# Patient Record
Sex: MALE | Race: WHITE | NOT HISPANIC OR LATINO | Employment: OTHER | URBAN - METROPOLITAN AREA
[De-identification: names, ages, dates, MRNs, and addresses within clinical notes are randomized per-mention and may not be internally consistent; named-entity substitution may affect disease eponyms.]

---

## 2017-01-17 ENCOUNTER — COMMUNICATION - HEALTHEAST (OUTPATIENT)
Dept: INTERNAL MEDICINE | Facility: CLINIC | Age: 77
End: 2017-01-17

## 2017-01-17 DIAGNOSIS — E11.9 DIABETES (H): ICD-10-CM

## 2017-02-07 ENCOUNTER — COMMUNICATION - HEALTHEAST (OUTPATIENT)
Dept: CARDIOLOGY | Facility: CLINIC | Age: 77
End: 2017-02-07

## 2017-02-07 ENCOUNTER — AMBULATORY - HEALTHEAST (OUTPATIENT)
Dept: CARDIOLOGY | Facility: CLINIC | Age: 77
End: 2017-02-07

## 2017-02-07 DIAGNOSIS — Z95.810 ICD (IMPLANTABLE CARDIOVERTER-DEFIBRILLATOR) IN PLACE: ICD-10-CM

## 2017-02-08 ENCOUNTER — COMMUNICATION - HEALTHEAST (OUTPATIENT)
Dept: INTERNAL MEDICINE | Facility: CLINIC | Age: 77
End: 2017-02-08

## 2017-02-20 ENCOUNTER — COMMUNICATION - HEALTHEAST (OUTPATIENT)
Dept: INTERNAL MEDICINE | Facility: CLINIC | Age: 77
End: 2017-02-20

## 2017-03-23 ENCOUNTER — COMMUNICATION - HEALTHEAST (OUTPATIENT)
Dept: SCHEDULING | Facility: CLINIC | Age: 77
End: 2017-03-23

## 2017-03-23 DIAGNOSIS — E78.5 HYPERLIPEMIA: ICD-10-CM

## 2017-04-18 ENCOUNTER — COMMUNICATION - HEALTHEAST (OUTPATIENT)
Dept: INTERNAL MEDICINE | Facility: CLINIC | Age: 77
End: 2017-04-18

## 2017-04-18 DIAGNOSIS — E11.9 DIABETES (H): ICD-10-CM

## 2017-05-16 ENCOUNTER — AMBULATORY - HEALTHEAST (OUTPATIENT)
Dept: CARDIOLOGY | Facility: CLINIC | Age: 77
End: 2017-05-16

## 2017-05-16 DIAGNOSIS — Z95.810 ICD (IMPLANTABLE CARDIOVERTER-DEFIBRILLATOR), DUAL, IN SITU: ICD-10-CM

## 2017-05-17 LAB — HCC DEVICE COMMENTS: NORMAL

## 2017-08-22 ENCOUNTER — AMBULATORY - HEALTHEAST (OUTPATIENT)
Dept: CARDIOLOGY | Facility: CLINIC | Age: 77
End: 2017-08-22

## 2017-08-22 DIAGNOSIS — Z95.810 ICD (IMPLANTABLE CARDIOVERTER-DEFIBRILLATOR), DUAL, IN SITU: ICD-10-CM

## 2017-08-22 LAB — HCC DEVICE COMMENTS: NORMAL

## 2017-08-29 ENCOUNTER — AMBULATORY - HEALTHEAST (OUTPATIENT)
Dept: CARDIOLOGY | Facility: CLINIC | Age: 77
End: 2017-08-29

## 2017-08-29 DIAGNOSIS — I25.10 CAD (CORONARY ARTERY DISEASE): ICD-10-CM

## 2017-08-29 DIAGNOSIS — Z95.0 CARDIAC PACEMAKER IN SITU: ICD-10-CM

## 2017-08-29 DIAGNOSIS — I47.20 VT (VENTRICULAR TACHYCARDIA) (H): ICD-10-CM

## 2017-10-23 ENCOUNTER — COMMUNICATION - HEALTHEAST (OUTPATIENT)
Dept: INTERNAL MEDICINE | Facility: CLINIC | Age: 77
End: 2017-10-23

## 2017-10-23 DIAGNOSIS — I10 HTN (HYPERTENSION): ICD-10-CM

## 2017-11-13 ENCOUNTER — AMBULATORY - HEALTHEAST (OUTPATIENT)
Dept: CARDIOLOGY | Facility: CLINIC | Age: 77
End: 2017-11-13

## 2017-11-13 DIAGNOSIS — I47.29 PAROXYSMAL VENTRICULAR TACHYCARDIA (H): ICD-10-CM

## 2017-11-13 DIAGNOSIS — Z95.810 ICD (IMPLANTABLE CARDIOVERTER-DEFIBRILLATOR), DUAL, IN SITU: ICD-10-CM

## 2017-11-13 DIAGNOSIS — I25.10 CORONARY ARTERY DISEASE INVOLVING NATIVE HEART WITHOUT ANGINA PECTORIS, UNSPECIFIED VESSEL OR LESION TYPE: ICD-10-CM

## 2017-11-13 LAB
CHOLEST SERPL-MCNC: 122 MG/DL
FASTING STATUS PATIENT QL REPORTED: ABNORMAL
HDLC SERPL-MCNC: 38 MG/DL
LDLC SERPL CALC-MCNC: 42 MG/DL
TRIGL SERPL-MCNC: 209 MG/DL

## 2017-11-13 ASSESSMENT — MIFFLIN-ST. JEOR: SCORE: 1475.1

## 2017-11-14 LAB — HCC DEVICE COMMENTS: NORMAL

## 2017-11-22 ENCOUNTER — HOSPITAL ENCOUNTER (OUTPATIENT)
Dept: CARDIOLOGY | Facility: CLINIC | Age: 77
Discharge: HOME | End: 2017-11-22
Attending: INTERNAL MEDICINE

## 2017-11-22 ASSESSMENT — MIFFLIN-ST. JEOR: SCORE: 1475.1

## 2017-11-24 LAB
AORTIC ROOT: 3.2 CM
BSA FOR ECHO PROCEDURE: 1.95 M2
CV BLOOD PRESSURE: NORMAL MMHG
CV ECHO HEIGHT: 69 IN
CV ECHO WEIGHT: 173 LBS
DOP CALC LVOT AREA: 3.14 CM2
DOP CALC LVOT DIAMETER: 2 CM
DOP CALC LVOT PEAK VEL: 49.6 CM/S
DOP CALC LVOT STROKE VOLUME: 47.1 CM3
DOP CALCLVOT PEAK VEL VTI: 15 CM
ECHO EJECTION FRACTION ESTIMATED: 35 %
EJECTION FRACTION: 42 % (ref 55–75)
FRACTIONAL SHORTENING: 21.6 % (ref 28–44)
INTERVENTRICULAR SEPTUM IN END DIASTOLE: 1.17 CM (ref 0.6–1)
IVS/PW RATIO: 1.1
LA AREA 2: 21.2 CM2
LEFT ATRIUM LENGTH: 4.86 CM
LEFT ATRIUM SIZE: 4.2 CM
LEFT ATRIUM TO AORTIC ROOT RATIO: 1.31 NO UNITS
LEFT VENTRICLE DIASTOLIC VOLUME INDEX: 71.8 CM3/M2 (ref 34–74)
LEFT VENTRICLE DIASTOLIC VOLUME: 140 CM3 (ref 62–150)
LEFT VENTRICLE MASS INDEX: 100.3 G/M2
LEFT VENTRICLE SYSTOLIC VOLUME INDEX: 41.5 CM3/M2 (ref 11–31)
LEFT VENTRICLE SYSTOLIC VOLUME: 81 CM3 (ref 21–61)
LEFT VENTRICULAR INTERNAL DIMENSION IN DIASTOLE: 4.77 CM (ref 4.2–5.8)
LEFT VENTRICULAR INTERNAL DIMENSION IN SYSTOLE: 3.74 CM (ref 2.5–4)
LEFT VENTRICULAR MASS: 195.7 G
LEFT VENTRICULAR OUTFLOW TRACT MEAN GRADIENT: 1 MMHG
LEFT VENTRICULAR OUTFLOW TRACT MEAN VELOCITY: 38 CM/S
LEFT VENTRICULAR OUTFLOW TRACT PEAK GRADIENT: 1 MMHG
LEFT VENTRICULAR POSTERIOR WALL IN END DIASTOLE: 1.06 CM (ref 0.6–1)
LV STROKE VOLUME INDEX: 24.2 ML/M2
MITRAL VALVE E/A RATIO: 1
MV DECELERATION TIME: 239 MS
MV E'TISSUE VEL-LAT: 5.95 CM/S
MV LATERAL E/E' RATIO: 14.3
MV PEAK A VELOCITY: 89.3 CM/S
MV PEAK E VELOCITY: 84.9 CM/S
NUC REST DIASTOLIC VOLUME INDEX: 2768 LBS
NUC REST SYSTOLIC VOLUME INDEX: 69 IN
TRICUSPID REGURGITATION PEAK PRESSURE GRADIENT: 17 MMHG
TRICUSPID VALVE ANULAR PLANE SYSTOLIC EXCURSION: 1.7 CM
TRICUSPID VALVE PEAK REGURGITANT VELOCITY: 206 CM/S
TV PEAK VELOCITY: 232 CM/S
TV PEAK VELOCITY: 232 CM/S

## 2017-11-28 ENCOUNTER — COMMUNICATION - HEALTHEAST (OUTPATIENT)
Dept: CARDIOLOGY | Facility: CLINIC | Age: 77
End: 2017-11-28

## 2017-12-01 ENCOUNTER — OFFICE VISIT - HEALTHEAST (OUTPATIENT)
Dept: INTERNAL MEDICINE | Facility: CLINIC | Age: 77
End: 2017-12-01

## 2017-12-01 ENCOUNTER — COMMUNICATION - HEALTHEAST (OUTPATIENT)
Dept: INTERNAL MEDICINE | Facility: CLINIC | Age: 77
End: 2017-12-01

## 2017-12-01 DIAGNOSIS — E11.9 DM2 (DIABETES MELLITUS, TYPE 2) (H): ICD-10-CM

## 2017-12-01 DIAGNOSIS — R05.3 CHRONIC COUGH: ICD-10-CM

## 2017-12-01 LAB — HBA1C MFR BLD: 6.8 % (ref 3.5–6)

## 2017-12-01 ASSESSMENT — MIFFLIN-ST. JEOR: SCORE: 1484.17

## 2017-12-15 ENCOUNTER — COMMUNICATION - HEALTHEAST (OUTPATIENT)
Dept: CARDIOLOGY | Facility: CLINIC | Age: 77
End: 2017-12-15

## 2017-12-15 DIAGNOSIS — I48.0 PAROXYSMAL ATRIAL FIBRILLATION (H): ICD-10-CM

## 2017-12-18 ENCOUNTER — COMMUNICATION - HEALTHEAST (OUTPATIENT)
Dept: INTERNAL MEDICINE | Facility: CLINIC | Age: 77
End: 2017-12-18

## 2017-12-18 DIAGNOSIS — E11.9 DM2 (DIABETES MELLITUS, TYPE 2) (H): ICD-10-CM

## 2018-01-19 ENCOUNTER — COMMUNICATION - HEALTHEAST (OUTPATIENT)
Dept: INTERNAL MEDICINE | Facility: CLINIC | Age: 78
End: 2018-01-19

## 2018-01-19 DIAGNOSIS — I10 HTN (HYPERTENSION): ICD-10-CM

## 2018-01-23 ENCOUNTER — RECORDS - HEALTHEAST (OUTPATIENT)
Dept: ADMINISTRATIVE | Facility: OTHER | Age: 78
End: 2018-01-23

## 2018-01-29 ENCOUNTER — COMMUNICATION - HEALTHEAST (OUTPATIENT)
Dept: MEDSURG UNIT | Facility: CLINIC | Age: 78
End: 2018-01-29

## 2018-01-31 ENCOUNTER — OFFICE VISIT - HEALTHEAST (OUTPATIENT)
Dept: INTERNAL MEDICINE | Facility: CLINIC | Age: 78
End: 2018-01-31

## 2018-01-31 DIAGNOSIS — R26.9 GAIT DISTURBANCE: ICD-10-CM

## 2018-01-31 DIAGNOSIS — E11.40 TYPE 2 DIABETES MELLITUS WITH DIABETIC NEUROPATHY, WITHOUT LONG-TERM CURRENT USE OF INSULIN (H): ICD-10-CM

## 2018-01-31 ASSESSMENT — MIFFLIN-ST. JEOR: SCORE: 1486.44

## 2018-02-01 ENCOUNTER — COMMUNICATION - HEALTHEAST (OUTPATIENT)
Dept: MEDSURG UNIT | Facility: CLINIC | Age: 78
End: 2018-02-01

## 2018-02-02 ENCOUNTER — COMMUNICATION - HEALTHEAST (OUTPATIENT)
Dept: MEDSURG UNIT | Facility: CLINIC | Age: 78
End: 2018-02-02

## 2018-02-02 DIAGNOSIS — K21.9 GERD (GASTROESOPHAGEAL REFLUX DISEASE): ICD-10-CM

## 2018-02-09 ENCOUNTER — COMMUNICATION - HEALTHEAST (OUTPATIENT)
Dept: CARDIOLOGY | Facility: CLINIC | Age: 78
End: 2018-02-09

## 2018-02-09 ENCOUNTER — COMMUNICATION - HEALTHEAST (OUTPATIENT)
Dept: INTERNAL MEDICINE | Facility: CLINIC | Age: 78
End: 2018-02-09

## 2018-02-09 ENCOUNTER — COMMUNICATION - HEALTHEAST (OUTPATIENT)
Dept: SCHEDULING | Facility: CLINIC | Age: 78
End: 2018-02-09

## 2018-02-14 ENCOUNTER — HOSPITAL ENCOUNTER (OUTPATIENT)
Dept: PHYSICAL THERAPY | Facility: HOSPITAL | Age: 78
Discharge: HOME | End: 2018-02-14
Admitting: RADIOLOGY

## 2018-02-14 ENCOUNTER — HOSPITAL ENCOUNTER (OUTPATIENT)
Dept: RADIOLOGY | Facility: HOSPITAL | Age: 78
Discharge: HOME | End: 2018-02-14
Attending: PSYCHIATRY & NEUROLOGY

## 2018-02-14 DIAGNOSIS — G91.2 NPH (NORMAL PRESSURE HYDROCEPHALUS) (H): ICD-10-CM

## 2018-02-14 DIAGNOSIS — R26.9 GAIT DISTURBANCE: ICD-10-CM

## 2018-02-14 LAB
APPEARANCE CSF: CLEAR
COLOR CSF: COLORLESS
GLUCOSE CSF-MCNC: 92 MG/DL (ref 40–75)
GRAM STAIN RESULT: NORMAL
PROT CSF-MCNC: 60 MG/DL (ref 15–45)
RBC # CSF MANUAL: 138 /UL
TUBE # CSF: 4
VOLUME CSF - HISTORICAL: 5 ML
WBC # CSF MANUAL: 1 /UL

## 2018-02-15 ENCOUNTER — HOSPITAL ENCOUNTER (OUTPATIENT)
Dept: PHYSICAL THERAPY | Facility: HOSPITAL | Age: 78
Discharge: HOME | End: 2018-02-15

## 2018-02-15 ENCOUNTER — COMMUNICATION - HEALTHEAST (OUTPATIENT)
Dept: INTERNAL MEDICINE | Facility: CLINIC | Age: 78
End: 2018-02-15

## 2018-02-15 DIAGNOSIS — R26.9 GAIT DISTURBANCE: ICD-10-CM

## 2018-02-17 LAB — BACTERIA SPEC CULT: NO GROWTH

## 2018-02-19 ENCOUNTER — AMBULATORY - HEALTHEAST (OUTPATIENT)
Dept: ADMINISTRATIVE | Facility: REHABILITATION | Age: 78
End: 2018-02-19

## 2018-02-19 ENCOUNTER — RECORDS - HEALTHEAST (OUTPATIENT)
Dept: ADMINISTRATIVE | Facility: OTHER | Age: 78
End: 2018-02-19

## 2018-02-19 ENCOUNTER — RECORDS - HEALTHEAST (OUTPATIENT)
Dept: LAB | Facility: HOSPITAL | Age: 78
End: 2018-02-19

## 2018-02-19 DIAGNOSIS — R26.9 GAIT DIFFICULTY: ICD-10-CM

## 2018-02-19 LAB
C REACTIVE PROTEIN LHE: <0.1 MG/DL (ref 0–0.8)
CK SERPL-CCNC: 105 U/L (ref 30–190)
ERYTHROCYTE [SEDIMENTATION RATE] IN BLOOD BY WESTERGREN METHOD: 10 MM/HR (ref 0–15)
RHEUMATOID FACT SERPL-ACNC: <15 IU/ML (ref 0–30)

## 2018-02-20 ENCOUNTER — AMBULATORY - HEALTHEAST (OUTPATIENT)
Dept: CARDIOLOGY | Facility: CLINIC | Age: 78
End: 2018-02-20

## 2018-02-20 DIAGNOSIS — Z95.810 ICD (IMPLANTABLE CARDIOVERTER-DEFIBRILLATOR), DUAL, IN SITU: ICD-10-CM

## 2018-02-20 LAB
ANA SER QL: 0.6 U
HCC DEVICE COMMENTS: NORMAL

## 2018-02-21 ENCOUNTER — OFFICE VISIT - HEALTHEAST (OUTPATIENT)
Dept: PHYSICAL THERAPY | Facility: REHABILITATION | Age: 78
End: 2018-02-21

## 2018-02-21 ENCOUNTER — AMBULATORY - HEALTHEAST (OUTPATIENT)
Dept: INTERNAL MEDICINE | Facility: CLINIC | Age: 78
End: 2018-02-21

## 2018-02-21 ENCOUNTER — OFFICE VISIT - HEALTHEAST (OUTPATIENT)
Dept: INTERNAL MEDICINE | Facility: CLINIC | Age: 78
End: 2018-02-21

## 2018-02-21 DIAGNOSIS — M62.81 GENERALIZED MUSCLE WEAKNESS: ICD-10-CM

## 2018-02-21 DIAGNOSIS — R26.9 GAIT DISTURBANCE: ICD-10-CM

## 2018-02-21 DIAGNOSIS — I10 ESSENTIAL HYPERTENSION: ICD-10-CM

## 2018-02-21 DIAGNOSIS — S01.311A LACERATION OF RIGHT EAR, INITIAL ENCOUNTER: ICD-10-CM

## 2018-02-21 DIAGNOSIS — R26.81 UNSTEADINESS ON FEET: ICD-10-CM

## 2018-02-21 ASSESSMENT — MIFFLIN-ST. JEOR: SCORE: 1477.36

## 2018-02-26 ENCOUNTER — OFFICE VISIT - HEALTHEAST (OUTPATIENT)
Dept: PHYSICAL THERAPY | Facility: REHABILITATION | Age: 78
End: 2018-02-26

## 2018-02-26 DIAGNOSIS — R26.9 GAIT DISTURBANCE: ICD-10-CM

## 2018-02-26 DIAGNOSIS — R26.81 UNSTEADINESS ON FEET: ICD-10-CM

## 2018-02-26 DIAGNOSIS — M62.81 GENERALIZED MUSCLE WEAKNESS: ICD-10-CM

## 2018-02-28 ENCOUNTER — COMMUNICATION - HEALTHEAST (OUTPATIENT)
Dept: SCHEDULING | Facility: CLINIC | Age: 78
End: 2018-02-28

## 2018-02-28 ENCOUNTER — RECORDS - HEALTHEAST (OUTPATIENT)
Dept: ADMINISTRATIVE | Facility: OTHER | Age: 78
End: 2018-02-28

## 2018-02-28 DIAGNOSIS — E78.5 HYPERLIPEMIA: ICD-10-CM

## 2018-03-02 ENCOUNTER — OFFICE VISIT - HEALTHEAST (OUTPATIENT)
Dept: PHYSICAL THERAPY | Facility: REHABILITATION | Age: 78
End: 2018-03-02

## 2018-03-02 DIAGNOSIS — M62.81 GENERALIZED MUSCLE WEAKNESS: ICD-10-CM

## 2018-03-02 DIAGNOSIS — R26.81 UNSTEADINESS ON FEET: ICD-10-CM

## 2018-03-02 DIAGNOSIS — R26.9 GAIT DISTURBANCE: ICD-10-CM

## 2018-03-05 ENCOUNTER — OFFICE VISIT - HEALTHEAST (OUTPATIENT)
Dept: PHYSICAL THERAPY | Facility: REHABILITATION | Age: 78
End: 2018-03-05

## 2018-03-05 DIAGNOSIS — R26.9 GAIT DISTURBANCE: ICD-10-CM

## 2018-03-05 DIAGNOSIS — M62.81 GENERALIZED MUSCLE WEAKNESS: ICD-10-CM

## 2018-03-05 DIAGNOSIS — R26.81 UNSTEADINESS ON FEET: ICD-10-CM

## 2018-03-08 ENCOUNTER — OFFICE VISIT - HEALTHEAST (OUTPATIENT)
Dept: PHYSICAL THERAPY | Facility: REHABILITATION | Age: 78
End: 2018-03-08

## 2018-03-08 DIAGNOSIS — R26.81 UNSTEADINESS ON FEET: ICD-10-CM

## 2018-03-08 DIAGNOSIS — M62.81 GENERALIZED MUSCLE WEAKNESS: ICD-10-CM

## 2018-03-08 DIAGNOSIS — R26.9 GAIT DISTURBANCE: ICD-10-CM

## 2018-03-12 ENCOUNTER — OFFICE VISIT - HEALTHEAST (OUTPATIENT)
Dept: PHYSICAL THERAPY | Facility: REHABILITATION | Age: 78
End: 2018-03-12

## 2018-03-12 DIAGNOSIS — M62.81 GENERALIZED MUSCLE WEAKNESS: ICD-10-CM

## 2018-03-12 DIAGNOSIS — R26.9 GAIT DISTURBANCE: ICD-10-CM

## 2018-03-12 DIAGNOSIS — R26.81 UNSTEADINESS ON FEET: ICD-10-CM

## 2018-03-16 ENCOUNTER — COMMUNICATION - HEALTHEAST (OUTPATIENT)
Dept: INTERNAL MEDICINE | Facility: CLINIC | Age: 78
End: 2018-03-16

## 2018-03-16 ENCOUNTER — OFFICE VISIT - HEALTHEAST (OUTPATIENT)
Dept: PHYSICAL THERAPY | Facility: REHABILITATION | Age: 78
End: 2018-03-16

## 2018-03-16 DIAGNOSIS — R26.81 UNSTEADINESS ON FEET: ICD-10-CM

## 2018-03-16 DIAGNOSIS — M62.81 GENERALIZED MUSCLE WEAKNESS: ICD-10-CM

## 2018-03-16 DIAGNOSIS — E11.40 TYPE 2 DIABETES MELLITUS WITH DIABETIC NEUROPATHY (H): ICD-10-CM

## 2018-03-16 DIAGNOSIS — R26.9 GAIT DISTURBANCE: ICD-10-CM

## 2018-03-19 ENCOUNTER — OFFICE VISIT - HEALTHEAST (OUTPATIENT)
Dept: PHYSICAL THERAPY | Facility: REHABILITATION | Age: 78
End: 2018-03-19

## 2018-03-19 DIAGNOSIS — M62.81 GENERALIZED MUSCLE WEAKNESS: ICD-10-CM

## 2018-03-19 DIAGNOSIS — R26.9 GAIT DISTURBANCE: ICD-10-CM

## 2018-03-19 DIAGNOSIS — R26.81 UNSTEADINESS ON FEET: ICD-10-CM

## 2018-04-11 ENCOUNTER — COMMUNICATION - HEALTHEAST (OUTPATIENT)
Dept: INTERNAL MEDICINE | Facility: CLINIC | Age: 78
End: 2018-04-11

## 2018-04-16 ENCOUNTER — RECORDS - HEALTHEAST (OUTPATIENT)
Dept: ADMINISTRATIVE | Facility: OTHER | Age: 78
End: 2018-04-16

## 2018-04-23 ENCOUNTER — COMMUNICATION - HEALTHEAST (OUTPATIENT)
Dept: INTERNAL MEDICINE | Facility: CLINIC | Age: 78
End: 2018-04-23

## 2018-04-23 DIAGNOSIS — E11.9 DIABETES (H): ICD-10-CM

## 2018-04-27 ENCOUNTER — COMMUNICATION - HEALTHEAST (OUTPATIENT)
Dept: INTERNAL MEDICINE | Facility: CLINIC | Age: 78
End: 2018-04-27

## 2018-05-04 ENCOUNTER — OFFICE VISIT - HEALTHEAST (OUTPATIENT)
Dept: INTERNAL MEDICINE | Facility: CLINIC | Age: 78
End: 2018-05-04

## 2018-05-04 DIAGNOSIS — R35.1 BENIGN PROSTATIC HYPERPLASIA WITH NOCTURIA: ICD-10-CM

## 2018-05-04 DIAGNOSIS — N40.1 BENIGN PROSTATIC HYPERPLASIA WITH NOCTURIA: ICD-10-CM

## 2018-05-04 DIAGNOSIS — E11.9 DM2 (DIABETES MELLITUS, TYPE 2) (H): ICD-10-CM

## 2018-05-04 LAB
ALBUMIN UR-MCNC: NEGATIVE MG/DL
APPEARANCE UR: CLEAR
BACTERIA #/AREA URNS HPF: ABNORMAL HPF
BILIRUB UR QL STRIP: NEGATIVE
COLOR UR AUTO: YELLOW
GLUCOSE UR STRIP-MCNC: NEGATIVE MG/DL
HBA1C MFR BLD: 6.8 % (ref 3.5–6)
HGB UR QL STRIP: NEGATIVE
KETONES UR STRIP-MCNC: NEGATIVE MG/DL
LEUKOCYTE ESTERASE UR QL STRIP: ABNORMAL
NITRATE UR QL: NEGATIVE
PH UR STRIP: 6 [PH] (ref 5–8)
PSA SERPL-MCNC: 4.1 NG/ML (ref 0–6.5)
RBC #/AREA URNS AUTO: ABNORMAL HPF
SP GR UR STRIP: 1.01 (ref 1–1.03)
SQUAMOUS #/AREA URNS AUTO: ABNORMAL LPF
UROBILINOGEN UR STRIP-ACNC: ABNORMAL
WBC #/AREA URNS AUTO: ABNORMAL HPF
WBC CLUMPS #/AREA URNS HPF: PRESENT /[HPF]

## 2018-05-04 ASSESSMENT — MIFFLIN-ST. JEOR: SCORE: 1472.83

## 2018-05-05 LAB — BACTERIA SPEC CULT: NO GROWTH

## 2018-05-07 ENCOUNTER — COMMUNICATION - HEALTHEAST (OUTPATIENT)
Dept: INTERNAL MEDICINE | Facility: CLINIC | Age: 78
End: 2018-05-07

## 2018-05-24 ENCOUNTER — AMBULATORY - HEALTHEAST (OUTPATIENT)
Dept: CARDIOLOGY | Facility: CLINIC | Age: 78
End: 2018-05-24

## 2018-05-24 DIAGNOSIS — Z95.810 ICD (IMPLANTABLE CARDIOVERTER-DEFIBRILLATOR), DUAL, IN SITU: ICD-10-CM

## 2018-05-25 LAB
HCC DEVICE COMMENTS: NORMAL
HCC DEVICE IMPLANTING PROVIDER: NORMAL
HCC DEVICE MANUFACTURE: NORMAL
HCC DEVICE MODEL: NORMAL
HCC DEVICE SERIAL NUMBER: NORMAL
HCC DEVICE TYPE: NORMAL

## 2018-06-05 ENCOUNTER — RECORDS - HEALTHEAST (OUTPATIENT)
Dept: ADMINISTRATIVE | Facility: OTHER | Age: 78
End: 2018-06-05

## 2018-06-13 ENCOUNTER — HOSPITAL ENCOUNTER (OUTPATIENT)
Dept: RESPIRATORY THERAPY | Facility: CLINIC | Age: 78
Discharge: HOME | End: 2018-06-13

## 2018-06-13 DIAGNOSIS — R09.89 HYPERINFLATION OF LUNGS: ICD-10-CM

## 2018-06-13 DIAGNOSIS — R05.3 CHRONIC COUGH: ICD-10-CM

## 2018-06-13 LAB — HGB BLD-MCNC: 12.9 G/DL (ref 14–18)

## 2018-06-15 ENCOUNTER — COMMUNICATION - HEALTHEAST (OUTPATIENT)
Dept: INTERNAL MEDICINE | Facility: CLINIC | Age: 78
End: 2018-06-15

## 2018-06-20 ENCOUNTER — COMMUNICATION - HEALTHEAST (OUTPATIENT)
Dept: INTERNAL MEDICINE | Facility: CLINIC | Age: 78
End: 2018-06-20

## 2018-07-26 ENCOUNTER — COMMUNICATION - HEALTHEAST (OUTPATIENT)
Dept: INTERNAL MEDICINE | Facility: CLINIC | Age: 78
End: 2018-07-26

## 2018-09-02 ENCOUNTER — AMBULATORY - HEALTHEAST (OUTPATIENT)
Dept: CARDIOLOGY | Facility: CLINIC | Age: 78
End: 2018-09-02

## 2018-09-02 DIAGNOSIS — Z95.810 ICD (IMPLANTABLE CARDIOVERTER-DEFIBRILLATOR), DUAL, IN SITU: ICD-10-CM

## 2018-09-25 ENCOUNTER — RECORDS - HEALTHEAST (OUTPATIENT)
Dept: ADMINISTRATIVE | Facility: OTHER | Age: 78
End: 2018-09-25

## 2018-09-25 ENCOUNTER — COMMUNICATION - HEALTHEAST (OUTPATIENT)
Dept: INTERNAL MEDICINE | Facility: CLINIC | Age: 78
End: 2018-09-25

## 2018-09-25 ENCOUNTER — AMBULATORY - HEALTHEAST (OUTPATIENT)
Dept: INTERNAL MEDICINE | Facility: CLINIC | Age: 78
End: 2018-09-25

## 2018-09-25 DIAGNOSIS — E11.40 DIABETIC NEUROPATHY (H): ICD-10-CM

## 2018-11-01 ENCOUNTER — COMMUNICATION - HEALTHEAST (OUTPATIENT)
Dept: INTERNAL MEDICINE | Facility: CLINIC | Age: 78
End: 2018-11-01

## 2018-11-28 ENCOUNTER — OFFICE VISIT - HEALTHEAST (OUTPATIENT)
Dept: INTERNAL MEDICINE | Facility: CLINIC | Age: 78
End: 2018-11-28

## 2018-11-28 ENCOUNTER — TRANSFERRED RECORDS (OUTPATIENT)
Dept: HEALTH INFORMATION MANAGEMENT | Facility: CLINIC | Age: 78
End: 2018-11-28

## 2018-11-28 DIAGNOSIS — I10 ESSENTIAL HYPERTENSION: ICD-10-CM

## 2018-11-28 DIAGNOSIS — H02.135 SENILE ECTROPION OF BOTH LOWER EYELIDS: ICD-10-CM

## 2018-11-28 DIAGNOSIS — E11.9 CONTROLLED TYPE 2 DIABETES MELLITUS WITHOUT COMPLICATION, WITHOUT LONG-TERM CURRENT USE OF INSULIN (H): ICD-10-CM

## 2018-11-28 DIAGNOSIS — I25.10 CORONARY ARTERY DISEASE INVOLVING NATIVE HEART WITHOUT ANGINA PECTORIS, UNSPECIFIED VESSEL OR LESION TYPE: ICD-10-CM

## 2018-11-28 DIAGNOSIS — E11.40 TYPE 2 DIABETES MELLITUS WITH DIABETIC NEUROPATHY, WITHOUT LONG-TERM CURRENT USE OF INSULIN (H): ICD-10-CM

## 2018-11-28 DIAGNOSIS — Z95.810 ICD (IMPLANTABLE CARDIOVERTER-DEFIBRILLATOR), DUAL, IN SITU: ICD-10-CM

## 2018-11-28 DIAGNOSIS — H02.132 SENILE ECTROPION OF BOTH LOWER EYELIDS: ICD-10-CM

## 2018-11-28 LAB
CREAT SERPL-MCNC: 1.08 MG/DL (ref 0.7–1.3)
ERYTHROCYTE [DISTWIDTH] IN BLOOD BY AUTOMATED COUNT: 11.2 % (ref 11–14.5)
GFR SERPL CREATININE-BSD FRML MDRD: >60 ML/MIN/1.73M2
GLUCOSE SERPL-MCNC: 168 MG/DL (ref 70–125)
HCT VFR BLD AUTO: 39.7 % (ref 40–54)
HGB BLD-MCNC: 13.4 G/DL (ref 14–18)
MCH RBC QN AUTO: 32.5 PG (ref 27–34)
MCHC RBC AUTO-ENTMCNC: 33.7 G/DL (ref 32–36)
MCV RBC AUTO: 96 FL (ref 80–100)
PLATELET # BLD AUTO: 235 THOU/UL (ref 140–440)
PMV BLD AUTO: 8.1 FL (ref 7–10)
POTASSIUM SERPL-SCNC: 4.5 MMOL/L (ref 3.5–5)
RBC # BLD AUTO: 4.11 MILL/UL (ref 4.4–6.2)
WBC: 6.6 THOU/UL (ref 4–11)

## 2018-11-28 ASSESSMENT — MIFFLIN-ST. JEOR: SCORE: 1441.08

## 2018-11-29 LAB
ANION GAP SERPL CALCULATED.3IONS-SCNC: 6 MMOL/L (ref 5–18)
BUN SERPL-MCNC: 15 MG/DL (ref 8–28)
CALCIUM SERPL-MCNC: 9.6 MG/DL (ref 8.5–10.5)
CHLORIDE BLD-SCNC: 103 MMOL/L (ref 98–107)
CO2 SERPL-SCNC: 27 MMOL/L (ref 22–31)
CREAT SERPL-MCNC: 1.08 MG/DL (ref 0.7–1.3)
GFR SERPL CREATININE-BSD FRML MDRD: >60 ML/MIN/1.73M2
GLUCOSE BLD-MCNC: 168 MG/DL (ref 70–125)
POTASSIUM BLD-SCNC: 4.5 MMOL/L (ref 3.5–5)
SODIUM SERPL-SCNC: 136 MMOL/L (ref 136–145)

## 2018-11-30 ENCOUNTER — COMMUNICATION - HEALTHEAST (OUTPATIENT)
Dept: CARDIOLOGY | Facility: CLINIC | Age: 78
End: 2018-11-30

## 2018-11-30 DIAGNOSIS — I25.10 CAD (CORONARY ARTERY DISEASE): ICD-10-CM

## 2018-12-04 ENCOUNTER — COMMUNICATION - HEALTHEAST (OUTPATIENT)
Dept: INTERNAL MEDICINE | Facility: CLINIC | Age: 78
End: 2018-12-04

## 2018-12-04 DIAGNOSIS — E11.40 TYPE 2 DIABETES MELLITUS WITH DIABETIC NEUROPATHY (H): ICD-10-CM

## 2018-12-05 ENCOUNTER — AMBULATORY - HEALTHEAST (OUTPATIENT)
Dept: CARDIOLOGY | Facility: CLINIC | Age: 78
End: 2018-12-05

## 2018-12-05 ENCOUNTER — TRANSFERRED RECORDS (OUTPATIENT)
Dept: HEALTH INFORMATION MANAGEMENT | Facility: CLINIC | Age: 78
End: 2018-12-05

## 2018-12-05 ENCOUNTER — COMMUNICATION - HEALTHEAST (OUTPATIENT)
Dept: INTERNAL MEDICINE | Facility: CLINIC | Age: 78
End: 2018-12-05

## 2018-12-05 ENCOUNTER — TELEPHONE (OUTPATIENT)
Dept: OPHTHALMOLOGY | Facility: CLINIC | Age: 78
End: 2018-12-05

## 2018-12-05 DIAGNOSIS — H02.132 SENILE ECTROPION OF BOTH LOWER EYELIDS: ICD-10-CM

## 2018-12-05 DIAGNOSIS — E11.40 TYPE 2 DIABETES MELLITUS WITH DIABETIC NEUROPATHY (H): ICD-10-CM

## 2018-12-05 DIAGNOSIS — Z95.810 ICD (IMPLANTABLE CARDIOVERTER-DEFIBRILLATOR), DUAL, IN SITU: ICD-10-CM

## 2018-12-05 DIAGNOSIS — Z95.810 ICD (IMPLANTABLE CARDIOVERTER-DEFIBRILLATOR) IN PLACE: ICD-10-CM

## 2018-12-05 DIAGNOSIS — H02.135 SENILE ECTROPION OF BOTH LOWER EYELIDS: ICD-10-CM

## 2018-12-05 DIAGNOSIS — I47.29 PAROXYSMAL VENTRICULAR TACHYCARDIA (H): ICD-10-CM

## 2018-12-05 ASSESSMENT — MIFFLIN-ST. JEOR: SCORE: 1441.08

## 2018-12-05 NOTE — TELEPHONE ENCOUNTER
Health Call Center    Phone Message    May a detailed message be left on voicemail: yes    Reason for Call: Other: Pt and daughter Aneta called regarding upcoming surgery with Dr Loyd. Aneta had a couple of question , she wanted to know if his paperwork was received, the time and location, and does the pt stop taking the baby asprin 1 week befor the surgery? Please advise. Thank you.     Action Taken: Message routed to:  [facility]

## 2018-12-06 ENCOUNTER — COMMUNICATION - HEALTHEAST (OUTPATIENT)
Dept: INTERNAL MEDICINE | Facility: CLINIC | Age: 78
End: 2018-12-06

## 2018-12-06 DIAGNOSIS — E11.40 TYPE 2 DIABETES MELLITUS WITH DIABETIC NEUROPATHY (H): ICD-10-CM

## 2018-12-06 NOTE — TELEPHONE ENCOUNTER
Patient is not a P Patient -     Referred to [facility] [phone]

## 2018-12-06 NOTE — TELEPHONE ENCOUNTER
Yumiko, Pt daughter Is trying to reach you.  Call back at [phone] and speak with Glenn hollingsworth

## 2018-12-06 NOTE — TELEPHONE ENCOUNTER
Yumiko-  Can you please call patient's daughter Aneta back? Looks like she is interested in pre-surgery packet.  Thank you,  Guera Blake RN 8:16 AM 12/06/18

## 2018-12-11 ENCOUNTER — OFFICE VISIT - HEALTHEAST (OUTPATIENT)
Dept: INTERNAL MEDICINE | Facility: CLINIC | Age: 78
End: 2018-12-11

## 2018-12-11 DIAGNOSIS — Z12.5 SCREENING FOR PROSTATE CANCER: ICD-10-CM

## 2018-12-11 DIAGNOSIS — Z00.00 ROUTINE GENERAL MEDICAL EXAMINATION AT A HEALTH CARE FACILITY: ICD-10-CM

## 2018-12-11 LAB
ALBUMIN SERPL-MCNC: 4.1 G/DL (ref 3.5–5)
ALP SERPL-CCNC: 82 U/L (ref 45–120)
ALT SERPL W P-5'-P-CCNC: 39 U/L (ref 0–45)
ANION GAP SERPL CALCULATED.3IONS-SCNC: 12 MMOL/L (ref 5–18)
AST SERPL W P-5'-P-CCNC: 20 U/L (ref 0–40)
BILIRUB SERPL-MCNC: 0.6 MG/DL (ref 0–1)
BUN SERPL-MCNC: 20 MG/DL (ref 8–28)
CALCIUM SERPL-MCNC: 10.4 MG/DL (ref 8.5–10.5)
CHLORIDE BLD-SCNC: 102 MMOL/L (ref 98–107)
CHOLEST SERPL-MCNC: 115 MG/DL
CO2 SERPL-SCNC: 25 MMOL/L (ref 22–31)
CREAT SERPL-MCNC: 1.17 MG/DL (ref 0.7–1.3)
ERYTHROCYTE [DISTWIDTH] IN BLOOD BY AUTOMATED COUNT: 12.4 % (ref 11–14.5)
FASTING STATUS PATIENT QL REPORTED: YES
GFR SERPL CREATININE-BSD FRML MDRD: 60 ML/MIN/1.73M2
GLUCOSE BLD-MCNC: 168 MG/DL (ref 70–125)
HBA1C MFR BLD: 6.6 % (ref 3.5–6)
HCT VFR BLD AUTO: 40.6 % (ref 40–54)
HDLC SERPL-MCNC: 42 MG/DL
HGB BLD-MCNC: 13.2 G/DL (ref 14–18)
LDLC SERPL CALC-MCNC: 35 MG/DL
MCH RBC QN AUTO: 32.4 PG (ref 27–34)
MCHC RBC AUTO-ENTMCNC: 32.5 G/DL (ref 32–36)
MCV RBC AUTO: 100 FL (ref 80–100)
PLATELET # BLD AUTO: 257 THOU/UL (ref 140–440)
PMV BLD AUTO: 10.7 FL (ref 8.5–12.5)
POTASSIUM BLD-SCNC: 4.5 MMOL/L (ref 3.5–5)
PROT SERPL-MCNC: 7.4 G/DL (ref 6–8)
PSA SERPL-MCNC: 3.6 NG/ML (ref 0–6.5)
RBC # BLD AUTO: 4.08 MILL/UL (ref 4.4–6.2)
SODIUM SERPL-SCNC: 139 MMOL/L (ref 136–145)
TRIGL SERPL-MCNC: 189 MG/DL
TSH SERPL DL<=0.005 MIU/L-ACNC: 4.99 UIU/ML (ref 0.3–5)
WBC: 6.6 THOU/UL (ref 4–11)

## 2018-12-11 ASSESSMENT — MIFFLIN-ST. JEOR: SCORE: 1427.47

## 2018-12-12 LAB
ALBUMIN UR-MCNC: NEGATIVE MG/DL
APPEARANCE UR: CLEAR
BILIRUB UR QL STRIP: NEGATIVE
COLOR UR AUTO: YELLOW
GLUCOSE UR STRIP-MCNC: NEGATIVE MG/DL
HGB UR QL STRIP: NEGATIVE
KETONES UR STRIP-MCNC: NEGATIVE MG/DL
LEUKOCYTE ESTERASE UR QL STRIP: NEGATIVE
NITRATE UR QL: NEGATIVE
PH UR STRIP: 5.5 [PH] (ref 5–8)
SP GR UR STRIP: 1.02 (ref 1–1.03)
UROBILINOGEN UR STRIP-ACNC: NORMAL

## 2018-12-13 ENCOUNTER — HOSPITAL ENCOUNTER (OUTPATIENT)
Facility: CLINIC | Age: 78
Discharge: HOME | End: 2018-12-13
Attending: OPHTHALMOLOGY | Admitting: OPHTHALMOLOGY
Payer: MEDICARE

## 2018-12-13 ENCOUNTER — ANESTHESIA (OUTPATIENT)
Dept: SURGERY | Facility: CLINIC | Age: 78
End: 2018-12-13
Payer: MEDICARE

## 2018-12-13 ENCOUNTER — COMMUNICATION - HEALTHEAST (OUTPATIENT)
Dept: INTERNAL MEDICINE | Facility: CLINIC | Age: 78
End: 2018-12-13

## 2018-12-13 ENCOUNTER — TRANSFERRED RECORDS (OUTPATIENT)
Dept: HEALTH INFORMATION MANAGEMENT | Facility: CLINIC | Age: 78
End: 2018-12-13

## 2018-12-13 ENCOUNTER — ANESTHESIA EVENT (OUTPATIENT)
Dept: SURGERY | Facility: CLINIC | Age: 78
End: 2018-12-13
Payer: MEDICARE

## 2018-12-13 VITALS
HEIGHT: 70 IN | SYSTOLIC BLOOD PRESSURE: 153 MMHG | DIASTOLIC BLOOD PRESSURE: 71 MMHG | TEMPERATURE: 96 F | RESPIRATION RATE: 17 BRPM | WEIGHT: 159.56 LBS | BODY MASS INDEX: 22.84 KG/M2 | OXYGEN SATURATION: 95 %

## 2018-12-13 DIAGNOSIS — Z98.890 POSTOPERATIVE EYE STATE: Primary | ICD-10-CM

## 2018-12-13 LAB — GLUCOSE BLDC GLUCOMTR-MCNC: 88 MG/DL (ref 70–99)

## 2018-12-13 PROCEDURE — 71000027 ZZH RECOVERY PHASE 2 EACH 15 MINS: Performed by: OPHTHALMOLOGY

## 2018-12-13 PROCEDURE — 71000012 ZZH RECOVERY PHASE 1 LEVEL 1 FIRST HR: Performed by: OPHTHALMOLOGY

## 2018-12-13 PROCEDURE — 25000125 ZZHC RX 250: Performed by: NURSE ANESTHETIST, CERTIFIED REGISTERED

## 2018-12-13 PROCEDURE — 37000008 ZZH ANESTHESIA TECHNICAL FEE, 1ST 30 MIN: Performed by: OPHTHALMOLOGY

## 2018-12-13 PROCEDURE — 36000056 ZZH SURGERY LEVEL 3 1ST 30 MIN: Performed by: OPHTHALMOLOGY

## 2018-12-13 PROCEDURE — 25000128 H RX IP 250 OP 636: Performed by: NURSE ANESTHETIST, CERTIFIED REGISTERED

## 2018-12-13 PROCEDURE — 88305 TISSUE EXAM BY PATHOLOGIST: CPT | Performed by: OPHTHALMOLOGY

## 2018-12-13 PROCEDURE — 27210794 ZZH OR GENERAL SUPPLY STERILE: Performed by: OPHTHALMOLOGY

## 2018-12-13 PROCEDURE — 88305 TISSUE EXAM BY PATHOLOGIST: CPT | Mod: 26 | Performed by: OPHTHALMOLOGY

## 2018-12-13 PROCEDURE — 25000125 ZZHC RX 250: Performed by: OPHTHALMOLOGY

## 2018-12-13 PROCEDURE — 82962 GLUCOSE BLOOD TEST: CPT

## 2018-12-13 PROCEDURE — 25000128 H RX IP 250 OP 636: Performed by: OPHTHALMOLOGY

## 2018-12-13 PROCEDURE — A9270 NON-COVERED ITEM OR SERVICE: HCPCS | Mod: GY | Performed by: OPHTHALMOLOGY

## 2018-12-13 PROCEDURE — 40000170 ZZH STATISTIC PRE-PROCEDURE ASSESSMENT II: Performed by: OPHTHALMOLOGY

## 2018-12-13 PROCEDURE — 25000132 ZZH RX MED GY IP 250 OP 250 PS 637: Mod: GY | Performed by: OPHTHALMOLOGY

## 2018-12-13 PROCEDURE — 36000058 ZZH SURGERY LEVEL 3 EA 15 ADDTL MIN: Performed by: OPHTHALMOLOGY

## 2018-12-13 PROCEDURE — 37000009 ZZH ANESTHESIA TECHNICAL FEE, EACH ADDTL 15 MIN: Performed by: OPHTHALMOLOGY

## 2018-12-13 RX ORDER — HYDROCODONE BITARTRATE AND ACETAMINOPHEN 5; 325 MG/1; MG/1
1 TABLET ORAL
Status: COMPLETED | OUTPATIENT
Start: 2018-12-13 | End: 2018-12-13

## 2018-12-13 RX ORDER — FENTANYL CITRATE 50 UG/ML
25-50 INJECTION, SOLUTION INTRAMUSCULAR; INTRAVENOUS
Status: DISCONTINUED | OUTPATIENT
Start: 2018-12-13 | End: 2018-12-13 | Stop reason: HOSPADM

## 2018-12-13 RX ORDER — ATORVASTATIN CALCIUM 20 MG/1
20 TABLET, FILM COATED ORAL DAILY
COMMUNITY
End: 2020-01-01

## 2018-12-13 RX ORDER — ERYTHROMYCIN 5 MG/G
OINTMENT OPHTHALMIC
Status: DISCONTINUED
Start: 2018-12-13 | End: 2018-12-13 | Stop reason: HOSPADM

## 2018-12-13 RX ORDER — ACETAMINOPHEN 325 MG/1
325-650 TABLET ORAL
Status: DISCONTINUED | OUTPATIENT
Start: 2018-12-13 | End: 2018-12-13 | Stop reason: HOSPADM

## 2018-12-13 RX ORDER — PROPOFOL 10 MG/ML
INJECTION, EMULSION INTRAVENOUS PRN
Status: DISCONTINUED | OUTPATIENT
Start: 2018-12-13 | End: 2018-12-13

## 2018-12-13 RX ORDER — DEXAMETHASONE SODIUM PHOSPHATE 4 MG/ML
4 INJECTION, SOLUTION INTRA-ARTICULAR; INTRALESIONAL; INTRAMUSCULAR; INTRAVENOUS; SOFT TISSUE EVERY 10 MIN PRN
Status: DISCONTINUED | OUTPATIENT
Start: 2018-12-13 | End: 2018-12-13 | Stop reason: HOSPADM

## 2018-12-13 RX ORDER — ERYTHROMYCIN 5 MG/G
OINTMENT OPHTHALMIC PRN
Status: DISCONTINUED | OUTPATIENT
Start: 2018-12-13 | End: 2018-12-13 | Stop reason: HOSPADM

## 2018-12-13 RX ORDER — HYDROCODONE BITARTRATE AND ACETAMINOPHEN 5; 325 MG/1; MG/1
1-2 TABLET ORAL EVERY 4 HOURS PRN
Qty: 10 TABLET | Refills: 0 | Status: SHIPPED | OUTPATIENT
Start: 2018-12-13 | End: 2018-12-16

## 2018-12-13 RX ORDER — ONDANSETRON 4 MG/1
4 TABLET, ORALLY DISINTEGRATING ORAL EVERY 30 MIN PRN
Status: DISCONTINUED | OUTPATIENT
Start: 2018-12-13 | End: 2018-12-13 | Stop reason: HOSPADM

## 2018-12-13 RX ORDER — ASPIRIN 81 MG/1
81 TABLET ORAL DAILY
COMMUNITY
End: 2020-01-01

## 2018-12-13 RX ORDER — GENTAMICIN 40 MG/ML
INJECTION, SOLUTION INTRAMUSCULAR; INTRAVENOUS
Status: DISCONTINUED
Start: 2018-12-13 | End: 2018-12-13 | Stop reason: HOSPADM

## 2018-12-13 RX ORDER — CHLORAL HYDRATE 500 MG
2 CAPSULE ORAL DAILY
COMMUNITY
End: 2020-01-01

## 2018-12-13 RX ORDER — TETRACAINE HYDROCHLORIDE 5 MG/ML
SOLUTION OPHTHALMIC PRN
Status: DISCONTINUED | OUTPATIENT
Start: 2018-12-13 | End: 2018-12-13 | Stop reason: HOSPADM

## 2018-12-13 RX ORDER — ONDANSETRON 4 MG/1
4 TABLET, ORALLY DISINTEGRATING ORAL
Status: DISCONTINUED | OUTPATIENT
Start: 2018-12-13 | End: 2018-12-13 | Stop reason: HOSPADM

## 2018-12-13 RX ORDER — MEPERIDINE HYDROCHLORIDE 25 MG/ML
12.5 INJECTION INTRAMUSCULAR; INTRAVENOUS; SUBCUTANEOUS
Status: DISCONTINUED | OUTPATIENT
Start: 2018-12-13 | End: 2018-12-13 | Stop reason: HOSPADM

## 2018-12-13 RX ORDER — FENTANYL CITRATE 50 UG/ML
INJECTION, SOLUTION INTRAMUSCULAR; INTRAVENOUS PRN
Status: DISCONTINUED | OUTPATIENT
Start: 2018-12-13 | End: 2018-12-13

## 2018-12-13 RX ORDER — ONDANSETRON 2 MG/ML
4 INJECTION INTRAMUSCULAR; INTRAVENOUS EVERY 30 MIN PRN
Status: DISCONTINUED | OUTPATIENT
Start: 2018-12-13 | End: 2018-12-13 | Stop reason: HOSPADM

## 2018-12-13 RX ORDER — MULTIPLE VITAMINS W/ MINERALS TAB 9MG-400MCG
1 TAB ORAL DAILY
COMMUNITY
End: 2020-01-01

## 2018-12-13 RX ORDER — ERYTHROMYCIN 5 MG/G
OINTMENT OPHTHALMIC
Qty: 3.5 G | Refills: 0 | Status: SHIPPED | OUTPATIENT
Start: 2018-12-13 | End: 2020-01-01

## 2018-12-13 RX ORDER — ERYTHROMYCIN 5 MG/G
OINTMENT OPHTHALMIC ONCE
Status: DISCONTINUED | OUTPATIENT
Start: 2018-12-13 | End: 2018-12-13 | Stop reason: HOSPADM

## 2018-12-13 RX ORDER — SODIUM CHLORIDE, SODIUM LACTATE, POTASSIUM CHLORIDE, CALCIUM CHLORIDE 600; 310; 30; 20 MG/100ML; MG/100ML; MG/100ML; MG/100ML
INJECTION, SOLUTION INTRAVENOUS CONTINUOUS
Status: DISCONTINUED | OUTPATIENT
Start: 2018-12-13 | End: 2018-12-13 | Stop reason: HOSPADM

## 2018-12-13 RX ORDER — METOPROLOL TARTRATE 50 MG
50 TABLET ORAL AT BEDTIME
COMMUNITY
End: 2020-01-01

## 2018-12-13 RX ORDER — LABETALOL HYDROCHLORIDE 5 MG/ML
10 INJECTION, SOLUTION INTRAVENOUS
Status: DISCONTINUED | OUTPATIENT
Start: 2018-12-13 | End: 2018-12-13 | Stop reason: HOSPADM

## 2018-12-13 RX ORDER — GLIMEPIRIDE 4 MG/1
4 TABLET ORAL 2 TIMES DAILY
COMMUNITY
End: 2020-01-01

## 2018-12-13 RX ORDER — NALOXONE HYDROCHLORIDE 0.4 MG/ML
.1-.4 INJECTION, SOLUTION INTRAMUSCULAR; INTRAVENOUS; SUBCUTANEOUS
Status: DISCONTINUED | OUTPATIENT
Start: 2018-12-13 | End: 2018-12-13 | Stop reason: HOSPADM

## 2018-12-13 RX ORDER — FLUTICASONE PROPIONATE 50 MCG
2 SPRAY, SUSPENSION (ML) NASAL DAILY PRN
COMMUNITY
End: 2020-01-01

## 2018-12-13 RX ORDER — SODIUM CHLORIDE, SODIUM LACTATE, POTASSIUM CHLORIDE, CALCIUM CHLORIDE 600; 310; 30; 20 MG/100ML; MG/100ML; MG/100ML; MG/100ML
INJECTION, SOLUTION INTRAVENOUS CONTINUOUS PRN
Status: DISCONTINUED | OUTPATIENT
Start: 2018-12-13 | End: 2018-12-13

## 2018-12-13 RX ORDER — ONDANSETRON 2 MG/ML
INJECTION INTRAMUSCULAR; INTRAVENOUS PRN
Status: DISCONTINUED | OUTPATIENT
Start: 2018-12-13 | End: 2018-12-13

## 2018-12-13 RX ORDER — TAMSULOSIN HYDROCHLORIDE 0.4 MG/1
0.4 CAPSULE ORAL 2 TIMES DAILY
COMMUNITY
End: 2020-01-01

## 2018-12-13 RX ORDER — HYDRALAZINE HYDROCHLORIDE 20 MG/ML
2.5-5 INJECTION INTRAMUSCULAR; INTRAVENOUS EVERY 10 MIN PRN
Status: DISCONTINUED | OUTPATIENT
Start: 2018-12-13 | End: 2018-12-13 | Stop reason: HOSPADM

## 2018-12-13 RX ADMIN — PROPOFOL 30 MG: 10 INJECTION, EMULSION INTRAVENOUS at 11:14

## 2018-12-13 RX ADMIN — HYDROCODONE BITARTRATE AND ACETAMINOPHEN 1 TABLET: 5; 325 TABLET ORAL at 12:48

## 2018-12-13 RX ADMIN — PROPOFOL 30 MG: 10 INJECTION, EMULSION INTRAVENOUS at 11:12

## 2018-12-13 RX ADMIN — FENTANYL CITRATE 25 MCG: 50 INJECTION, SOLUTION INTRAMUSCULAR; INTRAVENOUS at 11:26

## 2018-12-13 RX ADMIN — FENTANYL CITRATE 25 MCG: 50 INJECTION, SOLUTION INTRAMUSCULAR; INTRAVENOUS at 11:11

## 2018-12-13 RX ADMIN — SODIUM CHLORIDE, POTASSIUM CHLORIDE, SODIUM LACTATE AND CALCIUM CHLORIDE: 600; 310; 30; 20 INJECTION, SOLUTION INTRAVENOUS at 11:08

## 2018-12-13 RX ADMIN — DEXMEDETOMIDINE HYDROCHLORIDE 8 MCG: 100 INJECTION, SOLUTION INTRAVENOUS at 11:09

## 2018-12-13 RX ADMIN — ONDANSETRON 4 MG: 2 INJECTION INTRAMUSCULAR; INTRAVENOUS at 11:23

## 2018-12-13 RX ADMIN — DEXMEDETOMIDINE HYDROCHLORIDE 4 MCG: 100 INJECTION, SOLUTION INTRAVENOUS at 11:16

## 2018-12-13 SDOH — HEALTH STABILITY: MENTAL HEALTH: HOW OFTEN DO YOU HAVE A DRINK CONTAINING ALCOHOL?: NEVER

## 2018-12-13 ASSESSMENT — MIFFLIN-ST. JEOR: SCORE: 1445.02

## 2018-12-13 ASSESSMENT — ENCOUNTER SYMPTOMS: DYSRHYTHMIAS: 1

## 2018-12-13 NOTE — LETTER
"December 14, 2018      Jayden Guerrero  [address] [place] [address]        Dear Jayden,    Please see below for your test results. The right lower lid lesion was a BENIGN keratosis.     Resulted Orders   Surgical pathology exam   Result Value Ref Range    Copath Report       Patient Name: JAYDEN GUERRERO  MR#: [id number]  Specimen #: [id number]  Collected: 12/13/2018  Received: 12/13/2018  Reported: 12/14/2018 09:09  Ordering Phy(s): JUANITO GARNICA    For improved result formatting, select 'View Enhanced Report Format' under   Linked Documents section.    SPECIMEN(S):  Right lower lid lesion    FINAL DIAGNOSIS:  Skin, right lower lid, biopsy: Seborrheic keratosis.    Electronically signed out by:    Deo Wilson M.D.    GROSS:  The specimen is received in formalin with proper patient identification   labeled \"right lower lid lesion\".  The  specimen consists of tan elevated villous shave skin lesion(0.7 x 0.5 x   0.4 cm).  The specimen is inked black  and bisected.  The specimen is entirely submitted in one cassette.   (Dictated by: Osito Cartagena 12/13/2018  03:15 PM)    MICROSCOPIC:  Sections demonstrate skin with epidermal involvement by a proliferation of   basaloid keratinocytes,  hyperkeratosis, and pseudohorn cysts, consistent with seborrheic    keratosis.  There is no malignancy.    CPT Codes:  A: 02252-LZ1    TESTING LAB LOCATION:  [address]  [address]  [phone]    COLLECTION SITE:  Client[facility]  [facility]         If you have any questions, please call the clinic to make an appointment.    Sincerely,    Juanito Garnica MD    Oculoplastic Surgeon  Department of Ophthalmology & Visual Neurosciences  [facility]    "

## 2018-12-13 NOTE — OR NURSING
"ICD/PM IN PLACE - DR. GONZALEZ UPDATED, NO INFORMATION ON THE CHART.  \"OKAY TO GO INTO SURGERY\" WITHOUT ANY FURTHER INFORMATION REQUIRED AT THIS TIME.  "

## 2018-12-13 NOTE — DISCHARGE INSTRUCTIONS
Same Day Surgery Discharge Instructions for  Sedation and General Anesthesia       It's not unusual to feel dizzy, light-headed or faint for up to 24 hours after surgery or while taking pain medication.  If you have these symptoms: sit for a few minutes before standing and have someone assist you when you get up to walk or use the bathroom.      You should rest and relax for the next 24 hours. We recommend you make arrangements to have an adult stay with you for at least 24 hours after your discharge.  Avoid hazardous and strenuous activity.      DO NOT DRIVE any vehicle or operate mechanical equipment for 24 hours following the end of your surgery.  Even though you may feel normal, your reactions may be affected by the medication you have received.      Do not drink alcoholic beverages for 24 hours following surgery.       Slowly progress to your regular diet as you feel able. It's not unusual to feel nauseated and/or vomit after receiving anesthesia.  If you develop these symptoms, drink clear liquids (apple juice, ginger ale, broth, 7-up, etc. ) until you feel better.  If your nausea and vomiting persists for 24 hours, please notify your surgeon.        All narcotic pain medications, along with inactivity and anesthesia, can cause constipation. Drinking plenty of liquids and increasing fiber intake will help.      For any questions of a medical nature, call your surgeon.      Do not make important decisions for 24 hours.      If you had general anesthesia, you may have a sore throat for a couple of days related to the breathing tube used during surgery.  You may use Cepacol lozenges to help with this discomfort.  If it worsens or if you develop a fever, contact your surgeon.       If you feel your pain is not well managed with the pain medications prescribed by your surgeon, please contact your surgeon's office to let them know so they can address your concerns.             **If you have questions or concerns  about your procedure,   call Dr Garnica at [phone](if you see him in [place]) or  [phone] (if you see him at the [place])**      [facility]   Eyelid/Orbital Surgery with Skin Graft Discharge Instructions     Martín Garnica  [facility]  [phone]    ICE COMPRESSES  Immediately following surgery, you should begin to apply ice compresses to Right eye and Graft site (Right Neck) ONLY.  Apply a cold gel pack, which can be purchased at your drug store, or wrap a clean washcloth around a cup of crushed ice in a plastic bag (a bag of frozen peas also works well) and hold the cold compresses directly against the closed eyelid (s). Do this at least six times per day for 15 minutes, but not while sleeping.  Continue cold compresses for the first two days after surgery, or longer if swelling persists.      DRESSING  If a dressing is in place, it will removed at your first post-op appointment.   Do not put ice, ointment or heat to your graft site.     OINTMENT  You may be given some ointment when you leave the hospital.  Apply ointment twice daily to the donor site (except for oral donor sites ). The donor site is the area from where the tissue came.    ACTIVITY  Avoid heavy lifting or vigorous exercise for one week after surgery.  You may resume regular activities as tolerated.  You may shower and wash your hair on the day after surgery, using caution to not get your dressing wet.  Do not bathe or go swimming for 1 week. You may not resume driving until you have stopped your narcotic pain medications.    MEDICATION  If the doctor has given you some medications to take after surgery, please take these according to the instructions on the bottle.  Pain medications may make you drowsy so do not drive, operate heavy machinery, or use alcohol while taking it.  When you feel that you do not need the prescription pain medication, you may substitute Extra Strength Tylenol for mild pain.  Restart all of your regular home medications.  If you take Plavix or Aspirin on a regular basis, wait for 72 hours after your surgery before restarting these in order to decrease the risk of bleeding complications.    WHAT TO EXPECT  You should expect some slight oozing of blood from the donor and/or graft site over the first two to three days after surgery.  Swelling and bruising may occur for one to two weeks or longer.  You may also experience itching and tearing during the first several weeks after surgery.  This is part of the normal healing process    QUESTIONS  Please feel free to contact the office, should you have any questions that are not answered above.  Call the [facility] at [phone]  immediately if you are unable to establish vision in the operative eye, if you are experiencing severe pain or heavy bleeding that will not stop with gentle pressure.    After hours or on weekends and holidays, call [phone] and ask to speak with the ophthalmologist on call.

## 2018-12-13 NOTE — ANESTHESIA POSTPROCEDURE EVALUATION
Patient: Levy Serrano    Procedure(s):  LEFT LOWER LID ECTROPION REPAIR WITH SKIN GRAFT (FROM NECK - RIGHT SIDE), BIOPSY RIGHT LOWER LID LESION  BIOPSY RIGHT LOWER LID LESION    Diagnosis:LEFT LOWER LID ECTROPION, RIGHT LOWER LID LESION   Diagnosis Additional Information: No value filed.    Anesthesia Type:  MAC    Note:  Anesthesia Post Evaluation    Patient location during evaluation: PACU  Patient participation: Able to fully participate in evaluation  Level of consciousness: awake and alert  Pain management: adequate  Airway patency: patent  Cardiovascular status: acceptable  Respiratory status: acceptable and unassisted  Hydration status: acceptable  PONV: none             Last vitals:  Vitals:    12/13/18 1215 12/13/18 1230 12/13/18 1245   BP: 154/67 162/75 151/68   Resp: 18 8 17   Temp:      SpO2: 97% 99%          Electronically Signed By: Rona Oates MD  December 13, 2018  1:04 PM

## 2018-12-13 NOTE — ANESTHESIA PREPROCEDURE EVALUATION
Anesthesia Pre-Procedure Evaluation    Patient: Levy Serrano   MRN: [id number] : 1940          Preoperative Diagnosis: LEFT LOWER LID ECTROPION, RIGHT LOWER LID LESION     Procedure(s):  LEFT LOWER LID ECTROPION REPAIR WITH SKIN GRAFT  BIOPSY RIGHT LOWER LID LESION  COSMETIC BLEPHAROPLASTY LOWER LIDS BILATERAL    No past medical history on file.  No past surgical history on file.    Anesthesia Evaluation     . Pt has had prior anesthetic. Type: General    No history of anesthetic complications          ROS/MED HX    ENT/Pulmonary:      (-) sleep apnea   Neurologic:     (+)neuropathy TIA     Cardiovascular: Comment: H/O V tachycardia s/p AICD    (+) Dyslipidemia, hypertension--CAD, -CABG-stent,. : . CHF Last EF: 35 - 45 . . :ICD . dysrhythmias Other, .       METS/Exercise Tolerance:     Hematologic:         Musculoskeletal:         GI/Hepatic:        (-) GERD   Renal/Genitourinary:         Endo:     (+) type II DM Not using insulin Diabetic complications: neuropathy, .      Psychiatric:         Infectious Disease:         Malignancy:         Other:                          Physical Exam  Normal systems: cardiovascular and pulmonary    Airway   Mallampati: II  TM distance: >3 FB    Dental   (+) upper dentures and lower dentures    Cardiovascular       Pulmonary             No results found for: WBC, HGB, HCT, PLT, CRP, SED, NA, POTASSIUM, CHLORIDE, CO2, BUN, CR, GLC, MARTHA, PHOS, MAG, ALBUMIN, PROTTOTAL, ALT, AST, GGT, ALKPHOS, BILITOTAL, BILIDIRECT, LIPASE, AMYLASE, GLORIA, PTT, INR, FIBR, TSH, T4, T3, HCG, HCGS, CKTOTAL, CKMB, TROPN    Preop Vitals  BP Readings from Last 3 Encounters:   18 135/60    Pulse Readings from Last 3 Encounters:   No data found for Pulse      Resp Readings from Last 3 Encounters:   18 16    SpO2 Readings from Last 3 Encounters:   18 100%      Temp Readings from Last 1 Encounters:   18 35.7  C (96.2  F) (Oral)    Ht Readings from Last 1 Encounters:   18  "1.778 m (5' 10\")      Wt Readings from Last 1 Encounters:   12/13/18 72.4 kg (159 lb 9 oz)    Estimated body mass index is 22.89 kg/m  as calculated from the following:    Height as of this encounter: 1.778 m (5' 10\").    Weight as of this encounter: 72.4 kg (159 lb 9 oz).       Anesthesia Plan      History & Physical Review  History and physical reviewed and following examination; no interval change.    ASA Status:  3 .    NPO Status:  > 8 hours    Plan for MAC Reason for MAC:  Procedure to face, neck, head or breast  PONV prophylaxis:  Ondansetron (or other 5HT-3)       Postoperative Care  Postoperative pain management:  Oral pain medications.      Consents  Anesthetic plan, risks, benefits and alternatives discussed with:  Patient..                 Rona Oates MD  "

## 2018-12-13 NOTE — BRIEF OP NOTE
[facility] Brief Operative Note    Pre-operative diagnosis: LEFT LOWER LID ECTROPION, RIGHT LOWER LID LESION    Post-operative diagnosis same   Procedure: Procedure(s):  LEFT LOWER LID ECTROPION REPAIR WITH SKIN GRAFT  BIOPSY RIGHT LOWER LID LESION     Surgeon(s): Surgeon(s) and Role:  Panel 1:     * Martín Garnica MD - Primary     * Nnamdi Howell MD - Resident - Assisting  Panel 2:     * Martín Garnica MD - Primary   Estimated blood loss: * less than 10cc*    Specimens: ID Type Source Tests Collected by Time Destination   A :  Tissue Lower Eyelid SURGICAL PATHOLOGY EXAM Martín Garnica MD 12/13/2018 10:39 AM       Findings: As expected

## 2018-12-13 NOTE — OP NOTE
1st Assistant: Nnamdi Howell MD  PREOPERATIVE DIAGNOSIS: Left lower eyelid cicatricial ectropion. Right lower lid lesion.   POSTOPERATIVE DIAGNOSIS: Left lower eyelid cicatricial ectropion. Right lower lid lesion.  PROCEDURE: Left lower eyelid cicatricial ectropion repair with full-thickness skin graft and Frost suture placement. Right lower lid biopsy.   ANESTHESIA: Monitored with local infiltration of 2% lidocaine with epinephrine.  COMPLICATIONS: None.   ESTIMATED BLOOD LOSS: Less than 5 mL.   SPECIMEN: Right lower lid mass in formalin.   HISTORY: Levy Serrano  presented with lower lid ectropion with chronic irritation and tearing. He also had an enlarging Right lower lid lesion.  After the risks, benefits and alternatives to the proposed procedure were explained, informed consent was obtained.   DESCRIPTION OF PROCEDURE: Levy Serrano was brought to the operating room and placed supine on the operating table. IV sedation was given. Left lower lid was infiltrated with local anesthetic. The upper lid and brow were also infiltrated centrally for placement of a Frost suture later. Right neck was also infiltrated. The area  was prepped and draped in the typical sterile oculoplastic fashion. A 4-0 Prolene suture was placed through the lid margin and tagged superiorly to the drape. The subciliary incision was made with a 15 blade and cicatricial forces released with the Ameena scissors. Hemostasis was obtained with high temperature cautery. A lateral canthal incision was made and lateral tarsal strip fashioned.  The strip was secured to the lateral rim periosteum with a 5-0 PDS suture in a horizontal mattress fashion. The lateral canthal angle was closed with a grey line to grey line suture of 6 chromic gut and the skin closed with 6-0 gut suture.  The amount of skin necessary to close the defect was measured and placed in the right neck on a template and this was marked and then the area was incised with a #15  blade. Skin graft was excised with a Ameena scissors. The pretarsal area was closed with interrupted buried 5-0 Vicryl sutures. Skin was closed with running 6-0 chromic suture. The graft was then placed in the  subciliary space, trimmed to fit in the defect and sutured in place with interrupted 6-0 chromic and running 6-0 plain gut sutures. Erythromycin ophthalmic ointment was applied to the preauricular space and the skin graft. A Prolene suture was then placed in a Frost suture technique through the lower lid, upper lid and brow and tied over a Telfa bolster. A Telfa and cotton bolster was tied over the skin graft site using 2 horizontal mattress 5-0 Prolene sutures. Attention was directed to the Right lower lid. The lesion was elevated with a 0.5 mm Castroviejo forceps. The lesion was excised with a Ameena scissors.  Hemostasis was obtained with the high temperature loop wire cautery.  The lesion was placed in formalin and sent for pathologic evaluation.    Levy Serrano  tolerated the procedure well and was taken to recovery room in stable condition.   JUANITO BURNS MD

## 2018-12-13 NOTE — ANESTHESIA CARE TRANSFER NOTE
Patient: Levy Serrano    Procedure(s):  LEFT LOWER LID ECTROPION REPAIR WITH SKIN GRAFT (FROM NECK - RIGHT SIDE), BIOPSY RIGHT LOWER LID LESION  BIOPSY RIGHT LOWER LID LESION    Diagnosis: LEFT LOWER LID ECTROPION, RIGHT LOWER LID LESION   Diagnosis Additional Information: No value filed.    Anesthesia Type:   MAC     Note:  Airway :Room Air  Patient transferred to:PACU  Handoff Report: Identifed the Patient, Identified the Reponsible Provider, Reviewed the pertinent medical history, Discussed the surgical course, Reviewed Intra-OP anesthesia mangement and issues during anesthesia, Set expectations for post-procedure period and Allowed opportunity for questions and acknowledgement of understanding      Vitals: (Last set prior to Anesthesia Care Transfer)    CRNA VITALS  12/13/2018 1121 - 12/13/2018 1155      12/13/2018             Resp Rate (set):  10                Electronically Signed By: CALLI Govea CRNA  December 13, 2018  11:55 AM

## 2018-12-14 LAB — COPATH REPORT: NORMAL

## 2018-12-31 ENCOUNTER — COMMUNICATION - HEALTHEAST (OUTPATIENT)
Dept: SCHEDULING | Facility: CLINIC | Age: 78
End: 2018-12-31

## 2019-01-01 ENCOUNTER — COMMUNICATION - HEALTHEAST (OUTPATIENT)
Dept: INTERNAL MEDICINE | Facility: CLINIC | Age: 79
End: 2019-01-01

## 2019-01-01 ENCOUNTER — OFFICE VISIT - HEALTHEAST (OUTPATIENT)
Dept: INTERNAL MEDICINE | Facility: CLINIC | Age: 79
End: 2019-01-01

## 2019-01-01 ENCOUNTER — COMMUNICATION - HEALTHEAST (OUTPATIENT)
Dept: SCHEDULING | Facility: CLINIC | Age: 79
End: 2019-01-01

## 2019-01-01 ENCOUNTER — AMBULATORY - HEALTHEAST (OUTPATIENT)
Dept: CARDIOLOGY | Facility: CLINIC | Age: 79
End: 2019-01-01

## 2019-01-01 ENCOUNTER — HOME CARE/HOSPICE - HEALTHEAST (OUTPATIENT)
Dept: HOME HEALTH SERVICES | Facility: HOME HEALTH | Age: 79
End: 2019-01-01

## 2019-01-01 ENCOUNTER — COMMUNICATION - HEALTHEAST (OUTPATIENT)
Dept: CARE COORDINATION | Facility: CLINIC | Age: 79
End: 2019-01-01

## 2019-01-01 ENCOUNTER — RECORDS - HEALTHEAST (OUTPATIENT)
Dept: ADMINISTRATIVE | Facility: OTHER | Age: 79
End: 2019-01-01

## 2019-01-01 ENCOUNTER — COMMUNICATION - HEALTHEAST (OUTPATIENT)
Dept: HOME HEALTH SERVICES | Facility: HOME HEALTH | Age: 79
End: 2019-01-01

## 2019-01-01 ENCOUNTER — COMMUNICATION - HEALTHEAST (OUTPATIENT)
Dept: CARDIOLOGY | Facility: CLINIC | Age: 79
End: 2019-01-01

## 2019-01-01 ENCOUNTER — AMBULATORY - HEALTHEAST (OUTPATIENT)
Dept: CARE COORDINATION | Facility: CLINIC | Age: 79
End: 2019-01-01

## 2019-01-01 ENCOUNTER — COMMUNICATION - HEALTHEAST (OUTPATIENT)
Dept: NURSING | Facility: CLINIC | Age: 79
End: 2019-01-01

## 2019-01-01 DIAGNOSIS — R11.2 NAUSEA AND VOMITING, INTRACTABILITY OF VOMITING NOT SPECIFIED, UNSPECIFIED VOMITING TYPE: ICD-10-CM

## 2019-01-01 DIAGNOSIS — Z95.810 ICD (IMPLANTABLE CARDIOVERTER-DEFIBRILLATOR), DUAL, IN SITU: ICD-10-CM

## 2019-01-01 DIAGNOSIS — E11.8 TYPE 2 DIABETES MELLITUS WITH COMPLICATION, WITHOUT LONG-TERM CURRENT USE OF INSULIN (H): ICD-10-CM

## 2019-01-01 DIAGNOSIS — Z01.818 PRE-OP EXAM: ICD-10-CM

## 2019-01-01 DIAGNOSIS — E11.9 DIABETES (H): ICD-10-CM

## 2019-01-01 DIAGNOSIS — E11.40 TYPE 2 DIABETES MELLITUS WITH DIABETIC NEUROPATHY, WITHOUT LONG-TERM CURRENT USE OF INSULIN (H): ICD-10-CM

## 2019-01-01 DIAGNOSIS — E11.69 TYPE 2 DIABETES MELLITUS WITH OTHER SPECIFIED COMPLICATION, WITHOUT LONG-TERM CURRENT USE OF INSULIN (H): ICD-10-CM

## 2019-01-01 DIAGNOSIS — R26.81 UNSTEADINESS: ICD-10-CM

## 2019-01-01 DIAGNOSIS — I10 ESSENTIAL HYPERTENSION: ICD-10-CM

## 2019-01-01 DIAGNOSIS — I47.29 PAROXYSMAL VENTRICULAR TACHYCARDIA (H): ICD-10-CM

## 2019-01-01 DIAGNOSIS — E78.5 HYPERLIPEMIA: ICD-10-CM

## 2019-01-01 DIAGNOSIS — Z00.00 ROUTINE GENERAL MEDICAL EXAMINATION AT A HEALTH CARE FACILITY: ICD-10-CM

## 2019-01-01 DIAGNOSIS — I25.10 CAD (CORONARY ARTERY DISEASE): ICD-10-CM

## 2019-01-01 DIAGNOSIS — Z95.810 ICD (IMPLANTABLE CARDIOVERTER-DEFIBRILLATOR) IN PLACE: ICD-10-CM

## 2019-01-01 LAB
ALBUMIN SERPL-MCNC: 3.9 G/DL (ref 3.5–5)
ALP SERPL-CCNC: 75 U/L (ref 45–120)
ALT SERPL W P-5'-P-CCNC: 20 U/L (ref 0–45)
ANION GAP SERPL CALCULATED.3IONS-SCNC: 11 MMOL/L (ref 5–18)
AST SERPL W P-5'-P-CCNC: 18 U/L (ref 0–40)
BILIRUB SERPL-MCNC: 0.4 MG/DL (ref 0–1)
BUN SERPL-MCNC: 15 MG/DL (ref 8–28)
CALCIUM SERPL-MCNC: 10.1 MG/DL (ref 8.5–10.5)
CHLORIDE BLD-SCNC: 103 MMOL/L (ref 98–107)
CHOLEST SERPL-MCNC: 113 MG/DL
CHOLEST SERPL-MCNC: 135 MG/DL
CO2 SERPL-SCNC: 25 MMOL/L (ref 22–31)
CREAT SERPL-MCNC: 1.24 MG/DL (ref 0.7–1.3)
ERYTHROCYTE [DISTWIDTH] IN BLOOD BY AUTOMATED COUNT: 12.8 % (ref 11–14.5)
FASTING STATUS PATIENT QL REPORTED: ABNORMAL
FASTING STATUS PATIENT QL REPORTED: NORMAL
GFR SERPL CREATININE-BSD FRML MDRD: 56 ML/MIN/1.73M2
GLUCOSE BLD-MCNC: 108 MG/DL (ref 70–125)
GLUCOSE BLD-MCNC: 151 MG/DL (ref 70–125)
GLUCOSE BLD-MCNC: 162 MG/DL (ref 70–125)
HBA1C MFR BLD: 6.4 % (ref 3.5–6)
HBA1C MFR BLD: 7.3 % (ref 3.5–6)
HCC DEVICE COMMENTS: NORMAL
HCC DEVICE COMMENTS: NORMAL
HCC DEVICE IMPLANTING PROVIDER: NORMAL
HCC DEVICE IMPLANTING PROVIDER: NORMAL
HCC DEVICE MANUFACTURE: NORMAL
HCC DEVICE MANUFACTURE: NORMAL
HCC DEVICE MODEL: NORMAL
HCC DEVICE MODEL: NORMAL
HCC DEVICE SERIAL NUMBER: NORMAL
HCC DEVICE SERIAL NUMBER: NORMAL
HCC DEVICE TYPE: NORMAL
HCC DEVICE TYPE: NORMAL
HCT VFR BLD AUTO: 37.9 % (ref 40–54)
HDLC SERPL-MCNC: 39 MG/DL
HDLC SERPL-MCNC: 41 MG/DL
HGB BLD-MCNC: 12.5 G/DL (ref 14–18)
LDLC SERPL CALC-MCNC: 42 MG/DL
LDLC SERPL CALC-MCNC: 62 MG/DL
MCH RBC QN AUTO: 32.8 PG (ref 27–34)
MCHC RBC AUTO-ENTMCNC: 33 G/DL (ref 32–36)
MCV RBC AUTO: 100 FL (ref 80–100)
PLATELET # BLD AUTO: 236 THOU/UL (ref 140–440)
PMV BLD AUTO: 11.5 FL (ref 8.5–12.5)
POTASSIUM BLD-SCNC: 4.7 MMOL/L (ref 3.5–5)
PROT SERPL-MCNC: 7 G/DL (ref 6–8)
RBC # BLD AUTO: 3.81 MILL/UL (ref 4.4–6.2)
SODIUM SERPL-SCNC: 139 MMOL/L (ref 136–145)
TRIGL SERPL-MCNC: 161 MG/DL
TRIGL SERPL-MCNC: 162 MG/DL
WBC: 6.7 THOU/UL (ref 4–11)

## 2019-01-01 ASSESSMENT — MIFFLIN-ST. JEOR
SCORE: 1382.11
SCORE: 1386.65
SCORE: 1408.2
SCORE: 1432.01
SCORE: 1386.65

## 2019-01-02 ENCOUNTER — ANESTHESIA - HEALTHEAST (OUTPATIENT)
Dept: SURGERY | Facility: CLINIC | Age: 79
End: 2019-01-02

## 2019-01-02 ENCOUNTER — SURGERY - HEALTHEAST (OUTPATIENT)
Dept: SURGERY | Facility: CLINIC | Age: 79
End: 2019-01-02

## 2019-01-02 ASSESSMENT — MIFFLIN-ST. JEOR: SCORE: 1427.47

## 2019-01-17 ENCOUNTER — RECORDS - HEALTHEAST (OUTPATIENT)
Dept: ADMINISTRATIVE | Facility: OTHER | Age: 79
End: 2019-01-17

## 2019-01-22 ENCOUNTER — OFFICE VISIT - HEALTHEAST (OUTPATIENT)
Dept: INTERNAL MEDICINE | Facility: CLINIC | Age: 79
End: 2019-01-22

## 2019-01-22 DIAGNOSIS — G20.A1 PARKINSON DISEASE (H): ICD-10-CM

## 2019-01-22 DIAGNOSIS — E11.8 TYPE 2 DIABETES MELLITUS WITH COMPLICATION, UNSPECIFIED WHETHER LONG TERM INSULIN USE: ICD-10-CM

## 2019-01-22 LAB
CHOLEST SERPL-MCNC: 121 MG/DL
FASTING STATUS PATIENT QL REPORTED: ABNORMAL
FASTING STATUS PATIENT QL REPORTED: NO
GLUCOSE BLD-MCNC: 190 MG/DL (ref 70–125)
HBA1C MFR BLD: 6.6 % (ref 3.5–6)
HDLC SERPL-MCNC: 39 MG/DL
LDLC SERPL CALC-MCNC: 42 MG/DL
TRIGL SERPL-MCNC: 201 MG/DL

## 2019-01-22 ASSESSMENT — MIFFLIN-ST. JEOR: SCORE: 1422.93

## 2019-01-23 ENCOUNTER — COMMUNICATION - HEALTHEAST (OUTPATIENT)
Dept: INTERNAL MEDICINE | Facility: CLINIC | Age: 79
End: 2019-01-23

## 2019-01-25 ENCOUNTER — RECORDS - HEALTHEAST (OUTPATIENT)
Dept: ADMINISTRATIVE | Facility: OTHER | Age: 79
End: 2019-01-25

## 2019-01-28 ENCOUNTER — AMBULATORY - HEALTHEAST (OUTPATIENT)
Dept: CARDIOLOGY | Facility: CLINIC | Age: 79
End: 2019-01-28

## 2019-01-28 ENCOUNTER — RECORDS - HEALTHEAST (OUTPATIENT)
Dept: ADMINISTRATIVE | Facility: OTHER | Age: 79
End: 2019-01-28

## 2019-01-29 ENCOUNTER — RECORDS - HEALTHEAST (OUTPATIENT)
Dept: LAB | Facility: CLINIC | Age: 79
End: 2019-01-29

## 2019-01-29 LAB — VIT B12 SERPL-MCNC: 482 PG/ML (ref 213–816)

## 2019-02-01 LAB
ALBUMIN PERCENT: 64 % (ref 51–67)
ALBUMIN SERPL ELPH-MCNC: 4.4 G/DL (ref 3.2–4.7)
ALPHA 1 PERCENT: 2.7 % (ref 2–4)
ALPHA 2 PERCENT: 12 % (ref 5–13)
ALPHA1 GLOB SERPL ELPH-MCNC: 0.2 G/DL (ref 0.1–0.3)
ALPHA2 GLOB SERPL ELPH-MCNC: 0.8 G/DL (ref 0.4–0.9)
B BURGDOR AB SER-IMP: NORMAL
B-GLOBULIN SERPL ELPH-MCNC: 0.8 G/DL (ref 0.7–1.2)
BETA PERCENT: 12 % (ref 10–17)
GAMMA GLOB SERPL ELPH-MCNC: 0.6 G/DL (ref 0.6–1.4)
GAMMA GLOBULIN PERCENT: 9.3 % (ref 9–20)
LYME AB IGG BAND(S): NORMAL
LYME AB IGM BAND(S): NORMAL
LYME IGG BLOT: NEGATIVE
LYME IGM BLOT: NEGATIVE
PATH ICD:: NORMAL
PROT PATTERN SERPL ELPH-IMP: NORMAL
PROT SERPL-MCNC: 6.8 G/DL (ref 6–8)
REVIEWING PATHOLOGIST: NORMAL

## 2019-02-05 LAB
SS-A/RO AUTOANTIBODIES - HISTORICAL: 2 EU
SS-B/LA AUTOANTIBODIES - HISTORICAL: 0 EU

## 2019-02-06 ENCOUNTER — HOSPITAL ENCOUNTER (OUTPATIENT)
Dept: CT IMAGING | Facility: CLINIC | Age: 79
Discharge: HOME | End: 2019-02-06

## 2019-02-06 DIAGNOSIS — R26.9 GAIT DIFFICULTY: ICD-10-CM

## 2019-02-17 ENCOUNTER — COMMUNICATION - HEALTHEAST (OUTPATIENT)
Dept: INTERNAL MEDICINE | Facility: CLINIC | Age: 79
End: 2019-02-17

## 2019-02-17 DIAGNOSIS — E78.5 HYPERLIPEMIA: ICD-10-CM

## 2019-02-21 ENCOUNTER — RECORDS - HEALTHEAST (OUTPATIENT)
Dept: ADMINISTRATIVE | Facility: OTHER | Age: 79
End: 2019-02-21

## 2019-02-25 ENCOUNTER — COMMUNICATION - HEALTHEAST (OUTPATIENT)
Dept: CARDIOLOGY | Facility: CLINIC | Age: 79
End: 2019-02-25

## 2019-02-25 DIAGNOSIS — I25.10 CAD (CORONARY ARTERY DISEASE): ICD-10-CM

## 2019-03-07 ENCOUNTER — AMBULATORY - HEALTHEAST (OUTPATIENT)
Dept: CARDIOLOGY | Facility: CLINIC | Age: 79
End: 2019-03-07

## 2019-03-07 DIAGNOSIS — Z95.810 ICD (IMPLANTABLE CARDIOVERTER-DEFIBRILLATOR) IN PLACE: ICD-10-CM

## 2019-03-20 ENCOUNTER — AMBULATORY - HEALTHEAST (OUTPATIENT)
Dept: ADMINISTRATIVE | Facility: REHABILITATION | Age: 79
End: 2019-03-20

## 2019-03-20 DIAGNOSIS — R26.89 BALANCE PROBLEMS: ICD-10-CM

## 2019-04-08 ENCOUNTER — RECORDS - HEALTHEAST (OUTPATIENT)
Dept: ADMINISTRATIVE | Facility: OTHER | Age: 79
End: 2019-04-08

## 2019-04-09 ENCOUNTER — RECORDS - HEALTHEAST (OUTPATIENT)
Dept: ADMINISTRATIVE | Facility: OTHER | Age: 79
End: 2019-04-09

## 2019-04-16 ENCOUNTER — HOSPITAL ENCOUNTER (OUTPATIENT)
Dept: RADIOLOGY | Facility: HOSPITAL | Age: 79
Discharge: HOME | End: 2019-04-16

## 2019-04-16 ENCOUNTER — HOSPITAL ENCOUNTER (OUTPATIENT)
Dept: PHYSICAL THERAPY | Facility: HOSPITAL | Age: 79
Discharge: HOME | End: 2019-04-16
Admitting: RADIOLOGY

## 2019-04-16 DIAGNOSIS — R26.9 GAIT DIFFICULTY: ICD-10-CM

## 2019-04-16 DIAGNOSIS — R26.9 GAIT DISTURBANCE: ICD-10-CM

## 2019-04-16 LAB
APPEARANCE CSF: CLEAR
COLOR CSF: COLORLESS
GLUCOSE CSF-MCNC: 88 MG/DL (ref 40–75)
PROT CSF-MCNC: 69 MG/DL (ref 15–45)
RBC # CSF MANUAL: 0 /UL
TUBE # CSF: 4
VOLUME CSF - HISTORICAL: 7 ML
WBC # CSF MANUAL: 1 /UL

## 2019-04-17 ENCOUNTER — HOSPITAL ENCOUNTER (OUTPATIENT)
Dept: PHYSICAL THERAPY | Facility: HOSPITAL | Age: 79
Discharge: HOME | End: 2019-04-17

## 2019-04-17 LAB
GRAM STAIN RESULT: NORMAL

## 2019-04-19 LAB — BACTERIA SPEC CULT: NO GROWTH

## 2019-04-25 ENCOUNTER — COMMUNICATION - HEALTHEAST (OUTPATIENT)
Dept: INTERNAL MEDICINE | Facility: CLINIC | Age: 79
End: 2019-04-25

## 2019-05-13 ENCOUNTER — OFFICE VISIT - HEALTHEAST (OUTPATIENT)
Dept: INTERNAL MEDICINE | Facility: CLINIC | Age: 79
End: 2019-05-13

## 2019-05-13 DIAGNOSIS — E11.8 TYPE 2 DIABETES MELLITUS WITH COMPLICATION, WITHOUT LONG-TERM CURRENT USE OF INSULIN (H): ICD-10-CM

## 2019-05-13 LAB
ALBUMIN UR-MCNC: NEGATIVE MG/DL
APPEARANCE UR: CLEAR
BACTERIA #/AREA URNS HPF: ABNORMAL HPF
BILIRUB UR QL STRIP: NEGATIVE
CHOLEST SERPL-MCNC: 96 MG/DL
COLOR UR AUTO: YELLOW
CREAT UR-MCNC: 48.7 MG/DL
FASTING STATUS PATIENT QL REPORTED: YES
FASTING STATUS PATIENT QL REPORTED: YES
GLUCOSE BLD-MCNC: 128 MG/DL (ref 70–125)
GLUCOSE UR STRIP-MCNC: NEGATIVE MG/DL
HBA1C MFR BLD: 6.2 % (ref 3.5–6)
HDLC SERPL-MCNC: 37 MG/DL
HGB UR QL STRIP: NEGATIVE
KETONES UR STRIP-MCNC: NEGATIVE MG/DL
LDLC SERPL CALC-MCNC: 34 MG/DL
LEUKOCYTE ESTERASE UR QL STRIP: ABNORMAL
MICROALBUMIN UR-MCNC: 0.69 MG/DL (ref 0–1.99)
MICROALBUMIN/CREAT UR: 14.2 MG/G
NITRATE UR QL: NEGATIVE
PH UR STRIP: 5.5 [PH] (ref 5–8)
RBC #/AREA URNS AUTO: ABNORMAL HPF
SP GR UR STRIP: 1.01 (ref 1–1.03)
SQUAMOUS #/AREA URNS AUTO: ABNORMAL LPF
TRIGL SERPL-MCNC: 125 MG/DL
UROBILINOGEN UR STRIP-ACNC: ABNORMAL
WBC #/AREA URNS AUTO: ABNORMAL HPF

## 2019-05-13 ASSESSMENT — MIFFLIN-ST. JEOR: SCORE: 1436.54

## 2019-05-14 ENCOUNTER — AMBULATORY - HEALTHEAST (OUTPATIENT)
Dept: INTERNAL MEDICINE | Facility: CLINIC | Age: 79
End: 2019-05-14

## 2019-05-14 ENCOUNTER — HOME CARE/HOSPICE - HEALTHEAST (OUTPATIENT)
Dept: HOME HEALTH SERVICES | Facility: HOME HEALTH | Age: 79
End: 2019-05-14

## 2019-05-14 ENCOUNTER — COMMUNICATION - HEALTHEAST (OUTPATIENT)
Dept: INTERNAL MEDICINE | Facility: CLINIC | Age: 79
End: 2019-05-14

## 2019-05-14 ENCOUNTER — COMMUNICATION - HEALTHEAST (OUTPATIENT)
Dept: HOME HEALTH SERVICES | Facility: HOME HEALTH | Age: 79
End: 2019-05-14

## 2019-05-14 DIAGNOSIS — E11.40 TYPE 2 DIABETES MELLITUS WITH DIABETIC NEUROPATHY (H): ICD-10-CM

## 2019-05-14 DIAGNOSIS — R26.9 GAIT DISTURBANCE: ICD-10-CM

## 2019-05-14 LAB — BACTERIA SPEC CULT: NO GROWTH

## 2019-05-17 ENCOUNTER — COMMUNICATION - HEALTHEAST (OUTPATIENT)
Dept: HOME HEALTH SERVICES | Facility: HOME HEALTH | Age: 79
End: 2019-05-17

## 2019-05-20 ENCOUNTER — COMMUNICATION - HEALTHEAST (OUTPATIENT)
Dept: HOME HEALTH SERVICES | Facility: HOME HEALTH | Age: 79
End: 2019-05-20

## 2019-05-20 ENCOUNTER — HOME CARE/HOSPICE - HEALTHEAST (OUTPATIENT)
Dept: HOME HEALTH SERVICES | Facility: HOME HEALTH | Age: 79
End: 2019-05-20

## 2019-05-22 ENCOUNTER — HOME CARE/HOSPICE - HEALTHEAST (OUTPATIENT)
Dept: HOME HEALTH SERVICES | Facility: HOME HEALTH | Age: 79
End: 2019-05-22

## 2019-05-23 ENCOUNTER — HOME CARE/HOSPICE - HEALTHEAST (OUTPATIENT)
Dept: HOME HEALTH SERVICES | Facility: HOME HEALTH | Age: 79
End: 2019-05-23

## 2019-05-28 ENCOUNTER — HOME CARE/HOSPICE - HEALTHEAST (OUTPATIENT)
Dept: HOME HEALTH SERVICES | Facility: HOME HEALTH | Age: 79
End: 2019-05-28

## 2019-05-30 ENCOUNTER — HOME CARE/HOSPICE - HEALTHEAST (OUTPATIENT)
Dept: HOME HEALTH SERVICES | Facility: HOME HEALTH | Age: 79
End: 2019-05-30

## 2019-06-06 ENCOUNTER — HOME CARE/HOSPICE - HEALTHEAST (OUTPATIENT)
Dept: HOME HEALTH SERVICES | Facility: HOME HEALTH | Age: 79
End: 2019-06-06

## 2019-06-07 ENCOUNTER — HOME CARE/HOSPICE - HEALTHEAST (OUTPATIENT)
Dept: HOME HEALTH SERVICES | Facility: HOME HEALTH | Age: 79
End: 2019-06-07

## 2019-06-08 ENCOUNTER — COMMUNICATION - HEALTHEAST (OUTPATIENT)
Dept: INTERNAL MEDICINE | Facility: CLINIC | Age: 79
End: 2019-06-08

## 2019-06-08 DIAGNOSIS — E11.9 DIABETES (H): ICD-10-CM

## 2019-06-10 ENCOUNTER — AMBULATORY - HEALTHEAST (OUTPATIENT)
Dept: OTHER | Facility: CLINIC | Age: 79
End: 2019-06-10

## 2019-06-10 ENCOUNTER — AMBULATORY - HEALTHEAST (OUTPATIENT)
Dept: CARDIOLOGY | Facility: CLINIC | Age: 79
End: 2019-06-10

## 2019-06-10 ENCOUNTER — DOCUMENTATION ONLY (OUTPATIENT)
Dept: OTHER | Facility: CLINIC | Age: 79
End: 2019-06-10

## 2019-06-10 DIAGNOSIS — Z95.810 ICD (IMPLANTABLE CARDIOVERTER-DEFIBRILLATOR) IN PLACE: ICD-10-CM

## 2019-07-21 ENCOUNTER — COMMUNICATION - HEALTHEAST (OUTPATIENT)
Dept: CARDIOLOGY | Facility: CLINIC | Age: 79
End: 2019-07-21

## 2019-07-21 DIAGNOSIS — I25.10 CAD (CORONARY ARTERY DISEASE): ICD-10-CM

## 2020-01-01 ENCOUNTER — COMMUNICATION - HEALTHEAST (OUTPATIENT)
Dept: SCHEDULING | Facility: CLINIC | Age: 80
End: 2020-01-01

## 2020-01-01 ENCOUNTER — HOME CARE/HOSPICE - HEALTHEAST (OUTPATIENT)
Dept: HOME HEALTH SERVICES | Facility: HOME HEALTH | Age: 80
End: 2020-01-01

## 2020-01-01 ENCOUNTER — AMBULATORY - HEALTHEAST (OUTPATIENT)
Dept: INTERNAL MEDICINE | Facility: CLINIC | Age: 80
End: 2020-01-01

## 2020-01-01 ENCOUNTER — COMMUNICATION - HEALTHEAST (OUTPATIENT)
Dept: INTERNAL MEDICINE | Facility: CLINIC | Age: 80
End: 2020-01-01

## 2020-01-01 ENCOUNTER — RECORDS - HEALTHEAST (OUTPATIENT)
Dept: ADMINISTRATIVE | Facility: OTHER | Age: 80
End: 2020-01-01

## 2020-01-01 ENCOUNTER — VIRTUAL VISIT (OUTPATIENT)
Dept: GERIATRICS | Facility: CLINIC | Age: 80
End: 2020-01-01
Payer: MEDICARE

## 2020-01-01 ENCOUNTER — AMBULATORY - HEALTHEAST (OUTPATIENT)
Dept: CARDIOLOGY | Facility: CLINIC | Age: 80
End: 2020-01-01

## 2020-01-01 ENCOUNTER — COMMUNICATION - HEALTHEAST (OUTPATIENT)
Dept: HOME HEALTH SERVICES | Facility: HOME HEALTH | Age: 80
End: 2020-01-01

## 2020-01-01 ENCOUNTER — OFFICE VISIT - HEALTHEAST (OUTPATIENT)
Dept: INTERNAL MEDICINE | Facility: CLINIC | Age: 80
End: 2020-01-01

## 2020-01-01 ENCOUNTER — RECORDS - HEALTHEAST (OUTPATIENT)
Dept: LAB | Facility: CLINIC | Age: 80
End: 2020-01-01

## 2020-01-01 ENCOUNTER — AMBULATORY - HEALTHEAST (OUTPATIENT)
Dept: HOME HEALTH SERVICES | Facility: HOME HEALTH | Age: 80
End: 2020-01-01

## 2020-01-01 ENCOUNTER — COMMUNICATION - HEALTHEAST (OUTPATIENT)
Dept: CARDIOLOGY | Facility: CLINIC | Age: 80
End: 2020-01-01

## 2020-01-01 ENCOUNTER — HOSPITAL ENCOUNTER (OUTPATIENT)
Dept: ULTRASOUND IMAGING | Facility: CLINIC | Age: 80
Discharge: HOME | End: 2020-03-04

## 2020-01-01 ENCOUNTER — COMMUNICATION - HEALTHEAST (OUTPATIENT)
Dept: CARE COORDINATION | Facility: CLINIC | Age: 80
End: 2020-01-01

## 2020-01-01 ENCOUNTER — COMMUNICATION - HEALTHEAST (OUTPATIENT)
Dept: NURSING | Facility: CLINIC | Age: 80
End: 2020-01-01

## 2020-01-01 ENCOUNTER — NURSING HOME VISIT (OUTPATIENT)
Dept: GERIATRICS | Facility: CLINIC | Age: 80
End: 2020-01-01
Payer: MEDICARE

## 2020-01-01 ENCOUNTER — AMBULATORY - HEALTHEAST (OUTPATIENT)
Dept: CARE COORDINATION | Facility: CLINIC | Age: 80
End: 2020-01-01

## 2020-01-01 ENCOUNTER — SURGERY - HEALTHEAST (OUTPATIENT)
Dept: CARDIOLOGY | Facility: CLINIC | Age: 80
End: 2020-01-01

## 2020-01-01 VITALS
WEIGHT: 127 LBS | DIASTOLIC BLOOD PRESSURE: 50 MMHG | BODY MASS INDEX: 18.18 KG/M2 | HEART RATE: 70 BPM | HEIGHT: 70 IN | OXYGEN SATURATION: 93 % | SYSTOLIC BLOOD PRESSURE: 92 MMHG | TEMPERATURE: 98.5 F | RESPIRATION RATE: 16 BRPM

## 2020-01-01 VITALS
RESPIRATION RATE: 16 BRPM | BODY MASS INDEX: 19.8 KG/M2 | WEIGHT: 138 LBS | DIASTOLIC BLOOD PRESSURE: 68 MMHG | OXYGEN SATURATION: 100 % | HEART RATE: 72 BPM | TEMPERATURE: 97.8 F | SYSTOLIC BLOOD PRESSURE: 122 MMHG

## 2020-01-01 DIAGNOSIS — E11.40 TYPE 2 DIABETES MELLITUS WITH DIABETIC NEUROPATHY, WITHOUT LONG-TERM CURRENT USE OF INSULIN (H): ICD-10-CM

## 2020-01-01 DIAGNOSIS — R05.9 COUGH: ICD-10-CM

## 2020-01-01 DIAGNOSIS — M62.81 MUSCLE WEAKNESS (GENERALIZED): ICD-10-CM

## 2020-01-01 DIAGNOSIS — Z51.5 HOSPICE CARE PATIENT: ICD-10-CM

## 2020-01-01 DIAGNOSIS — R26.9 GAIT DISTURBANCE: ICD-10-CM

## 2020-01-01 DIAGNOSIS — I25.5 ISCHEMIC CARDIOMYOPATHY: ICD-10-CM

## 2020-01-01 DIAGNOSIS — E11.69 TYPE 2 DIABETES MELLITUS WITH OTHER SPECIFIED COMPLICATION, WITHOUT LONG-TERM CURRENT USE OF INSULIN (H): ICD-10-CM

## 2020-01-01 DIAGNOSIS — N39.0 URINARY TRACT INFECTION WITH HEMATURIA, SITE UNSPECIFIED: ICD-10-CM

## 2020-01-01 DIAGNOSIS — J96.01 ACUTE RESPIRATORY FAILURE WITH HYPOXIA (H): ICD-10-CM

## 2020-01-01 DIAGNOSIS — E88.810 METABOLIC SYNDROME: ICD-10-CM

## 2020-01-01 DIAGNOSIS — K21.9 GASTROESOPHAGEAL REFLUX DISEASE, ESOPHAGITIS PRESENCE NOT SPECIFIED: ICD-10-CM

## 2020-01-01 DIAGNOSIS — Z00.00 ROUTINE GENERAL MEDICAL EXAMINATION AT A HEALTH CARE FACILITY: ICD-10-CM

## 2020-01-01 DIAGNOSIS — Z95.810 ICD (IMPLANTABLE CARDIOVERTER-DEFIBRILLATOR), DUAL, IN SITU: ICD-10-CM

## 2020-01-01 DIAGNOSIS — I25.10 CAD (CORONARY ARTERY DISEASE): ICD-10-CM

## 2020-01-01 DIAGNOSIS — I67.9 CEREBROVASCULAR DISEASE: ICD-10-CM

## 2020-01-01 DIAGNOSIS — N39.0 URINARY TRACT INFECTION WITHOUT HEMATURIA, SITE UNSPECIFIED: ICD-10-CM

## 2020-01-01 DIAGNOSIS — R29.6 RECURRENT FALLS: ICD-10-CM

## 2020-01-01 DIAGNOSIS — M48.061 SPINAL STENOSIS OF LUMBAR REGION, UNSPECIFIED WHETHER NEUROGENIC CLAUDICATION PRESENT: ICD-10-CM

## 2020-01-01 DIAGNOSIS — T83.511S URINARY TRACT INFECTION ASSOCIATED WITH CATHETERIZATION OF URINARY TRACT, UNSPECIFIED INDWELLING URINARY CATHETER TYPE, SEQUELA: ICD-10-CM

## 2020-01-01 DIAGNOSIS — Z45.02 IMPLANTABLE CARDIOVERTER-DEFIBRILLATOR (ICD) AT END OF BATTERY LIFE: ICD-10-CM

## 2020-01-01 DIAGNOSIS — D64.9 NORMOCYTIC NORMOCHROMIC ANEMIA: ICD-10-CM

## 2020-01-01 DIAGNOSIS — R19.7 DIARRHEA, UNSPECIFIED TYPE: ICD-10-CM

## 2020-01-01 DIAGNOSIS — I10 ESSENTIAL HYPERTENSION: ICD-10-CM

## 2020-01-01 DIAGNOSIS — R62.7 FAILURE TO THRIVE IN ADULT: ICD-10-CM

## 2020-01-01 DIAGNOSIS — F03.90 DEMENTIA WITHOUT BEHAVIORAL DISTURBANCE, UNSPECIFIED DEMENTIA TYPE: Primary | ICD-10-CM

## 2020-01-01 DIAGNOSIS — Z97.8 FOLEY CATHETER IN PLACE: ICD-10-CM

## 2020-01-01 DIAGNOSIS — R31.9 URINARY TRACT INFECTION WITH HEMATURIA, SITE UNSPECIFIED: ICD-10-CM

## 2020-01-01 DIAGNOSIS — R53.1 GENERAL WEAKNESS: ICD-10-CM

## 2020-01-01 DIAGNOSIS — E78.5 HYPERLIPEMIA: ICD-10-CM

## 2020-01-01 DIAGNOSIS — N31.9 NEUROGENIC BLADDER: ICD-10-CM

## 2020-01-01 DIAGNOSIS — N39.0 URINARY TRACT INFECTION ASSOCIATED WITH CATHETERIZATION OF URINARY TRACT, UNSPECIFIED INDWELLING URINARY CATHETER TYPE, SEQUELA: ICD-10-CM

## 2020-01-01 DIAGNOSIS — R26.89 IMBALANCE: ICD-10-CM

## 2020-01-01 DIAGNOSIS — Z95.810 ICD (IMPLANTABLE CARDIOVERTER-DEFIBRILLATOR) IN PLACE: ICD-10-CM

## 2020-01-01 LAB
ALBUMIN UR-MCNC: ABNORMAL MG/DL
ALBUMIN UR-MCNC: NEGATIVE MG/DL
AMORPH CRY #/AREA URNS HPF: ABNORMAL /[HPF]
APPEARANCE UR: ABNORMAL
APPEARANCE UR: CLEAR
BACTERIA #/AREA URNS HPF: ABNORMAL HPF
BACTERIA #/AREA URNS HPF: ABNORMAL HPF
BACTERIA SPEC CULT: ABNORMAL
BACTERIA SPEC CULT: ABNORMAL
BACTERIA SPEC CULT: NO GROWTH
BILIRUB UR QL STRIP: NEGATIVE
BILIRUB UR QL STRIP: NEGATIVE
C DIFF TOX B STL QL: NEGATIVE
CAOX CRY #/AREA URNS HPF: PRESENT /[HPF]
COLOR UR AUTO: YELLOW
COLOR UR AUTO: YELLOW
GLUCOSE UR STRIP-MCNC: NEGATIVE MG/DL
GLUCOSE UR STRIP-MCNC: NEGATIVE MG/DL
HCC DEVICE COMMENTS: NORMAL
HCC DEVICE IMPLANTING PROVIDER: NORMAL
HCC DEVICE MANUFACTURE: NORMAL
HCC DEVICE MODEL: NORMAL
HCC DEVICE SERIAL NUMBER: NORMAL
HCC DEVICE TYPE: NORMAL
HGB UR QL STRIP: ABNORMAL
HGB UR QL STRIP: ABNORMAL
KETONES UR STRIP-MCNC: NEGATIVE MG/DL
KETONES UR STRIP-MCNC: NEGATIVE MG/DL
LEUKOCYTE ESTERASE UR QL STRIP: ABNORMAL
LEUKOCYTE ESTERASE UR QL STRIP: ABNORMAL
MUCOUS THREADS #/AREA URNS LPF: ABNORMAL LPF
MUCOUS THREADS #/AREA URNS LPF: ABNORMAL LPF
NITRATE UR QL: NEGATIVE
NITRATE UR QL: POSITIVE
PH UR STRIP: 5.5 [PH] (ref 4.5–8)
PH UR STRIP: 6 [PH] (ref 4.5–8)
RBC #/AREA URNS AUTO: ABNORMAL HPF
RBC #/AREA URNS AUTO: ABNORMAL HPF
RIBOTYPE 027/NAP1/BI: NORMAL
SP GR UR STRIP: 1 (ref 1–1.03)
SP GR UR STRIP: 1.02 (ref 1–1.03)
SQUAMOUS #/AREA URNS AUTO: ABNORMAL LPF
SQUAMOUS #/AREA URNS AUTO: ABNORMAL LPF
UROBILINOGEN UR STRIP-ACNC: ABNORMAL
UROBILINOGEN UR STRIP-ACNC: ABNORMAL
WBC #/AREA URNS AUTO: >100 HPF
WBC #/AREA URNS AUTO: ABNORMAL HPF
WBC CLUMPS #/AREA URNS HPF: PRESENT /[HPF]

## 2020-01-01 PROCEDURE — 99305 1ST NF CARE MODERATE MDM 35: CPT | Mod: GW | Performed by: NURSE PRACTITIONER

## 2020-01-01 PROCEDURE — 99305 1ST NF CARE MODERATE MDM 35: CPT | Mod: 95 | Performed by: FAMILY MEDICINE

## 2020-01-01 RX ORDER — LEVOFLOXACIN 750 MG/1
750 TABLET, FILM COATED ORAL DAILY
COMMUNITY
Start: 2020-01-01 | End: 2020-01-01

## 2020-01-01 RX ORDER — BENZONATATE 100 MG/1
100 CAPSULE ORAL EVERY 6 HOURS PRN
COMMUNITY
Start: 2020-01-01 | End: 2020-01-01

## 2020-01-01 RX ORDER — PROCHLORPERAZINE MALEATE 10 MG
10 TABLET ORAL EVERY 6 HOURS PRN
COMMUNITY

## 2020-01-01 RX ORDER — ASCORBIC ACID 500 MG
500 TABLET ORAL
COMMUNITY
End: 2020-01-01

## 2020-01-01 RX ORDER — BISACODYL 10 MG
10 SUPPOSITORY, RECTAL RECTAL DAILY PRN
COMMUNITY

## 2020-01-01 RX ORDER — CEPHALEXIN 500 MG/1
500 CAPSULE ORAL
COMMUNITY
Start: 2020-01-01 | End: 2020-01-01

## 2020-01-01 RX ORDER — DIPHENHYDRAMINE HCL 25 MG
1 TABLET ORAL AT BEDTIME
COMMUNITY
End: 2020-01-01

## 2020-01-01 RX ORDER — ACETAMINOPHEN 325 MG/1
650 TABLET ORAL EVERY 4 HOURS PRN
COMMUNITY

## 2020-01-01 RX ORDER — GLIMEPIRIDE 4 MG/1
4 TABLET ORAL
COMMUNITY
End: 2020-01-01

## 2020-01-01 RX ORDER — CHLORAL HYDRATE 500 MG
1 CAPSULE ORAL
COMMUNITY
End: 2020-01-01

## 2020-01-01 RX ORDER — ATORVASTATIN CALCIUM 20 MG/1
20 TABLET, FILM COATED ORAL DAILY
COMMUNITY
Start: 2019-01-01 | End: 2020-01-01

## 2020-01-01 RX ORDER — LORAZEPAM 0.5 MG/1
0.5 TABLET ORAL EVERY 4 HOURS PRN
COMMUNITY

## 2020-01-01 RX ORDER — LOPERAMIDE HYDROCHLORIDE 2 MG/1
1 TABLET ORAL PRN
COMMUNITY
End: 2020-01-01

## 2020-01-01 RX ORDER — PSYLLIUM HUSK 0.4 G
1000 CAPSULE ORAL
COMMUNITY
End: 2020-01-01

## 2020-01-01 RX ORDER — OMEPRAZOLE 20 MG/1
20 TABLET, DELAYED RELEASE ORAL DAILY
COMMUNITY

## 2020-01-01 RX ORDER — CIPROFLOXACIN 250 MG/1
250 TABLET, FILM COATED ORAL
COMMUNITY
Start: 2020-01-01 | End: 2020-01-01

## 2020-01-01 RX ORDER — ASPIRIN 81 MG/1
81 TABLET, CHEWABLE ORAL
COMMUNITY
End: 2020-01-01

## 2020-01-01 RX ORDER — FLUTICASONE PROPIONATE 50 MCG
2 SPRAY, SUSPENSION (ML) NASAL DAILY
COMMUNITY
End: 2020-01-01

## 2020-01-01 RX ORDER — TAMSULOSIN HYDROCHLORIDE 0.4 MG/1
0.4 CAPSULE ORAL DAILY
COMMUNITY
End: 2020-01-01

## 2020-01-01 RX ORDER — METOPROLOL TARTRATE 50 MG
50 TABLET ORAL DAILY
COMMUNITY

## 2020-01-01 RX ORDER — AMOXICILLIN 250 MG
1 CAPSULE ORAL DAILY
COMMUNITY

## 2020-01-01 RX ORDER — FINASTERIDE 5 MG/1
1 TABLET, FILM COATED ORAL DAILY
COMMUNITY
Start: 2019-01-01 | End: 2020-01-01

## 2020-01-01 RX ORDER — HYOSCYAMINE SULFATE 0.125 MG
0.12 TABLET ORAL EVERY 4 HOURS PRN
COMMUNITY

## 2020-01-01 RX ORDER — CIPROFLOXACIN 500 MG/1
500 TABLET, FILM COATED ORAL
COMMUNITY
Start: 2020-01-01 | End: 2020-01-01

## 2020-01-01 RX ORDER — METOPROLOL TARTRATE 50 MG
50 TABLET ORAL DAILY
COMMUNITY
End: 2020-01-01

## 2020-01-01 RX ORDER — ACETAMINOPHEN 500 MG
500 TABLET ORAL EVERY 6 HOURS PRN
COMMUNITY
Start: 2019-01-01 | End: 2020-01-01

## 2020-01-01 RX ORDER — NYSTATIN 100000 [USP'U]/G
POWDER TOPICAL 3 TIMES DAILY
COMMUNITY
Start: 2020-01-01 | End: 2020-01-01

## 2020-01-01 RX ORDER — MORPHINE SULFATE 20 MG/ML
5 SOLUTION ORAL
COMMUNITY

## 2020-01-01 ASSESSMENT — MIFFLIN-ST. JEOR: SCORE: 1287.32

## 2020-07-09 PROBLEM — M54.16 LUMBAR RADICULOPATHY: Status: ACTIVE | Noted: 2020-01-01

## 2020-07-09 PROBLEM — I10 HTN (HYPERTENSION): Status: ACTIVE | Noted: 2017-10-24

## 2020-07-09 PROBLEM — R29.6 RECURRENT FALLS: Status: ACTIVE | Noted: 2019-01-01

## 2020-07-09 PROBLEM — E86.0 DEHYDRATION: Status: ACTIVE | Noted: 2019-01-01

## 2020-07-09 PROBLEM — I67.9 CEREBROVASCULAR DISEASE: Status: ACTIVE | Noted: 2019-01-01

## 2020-07-09 PROBLEM — L72.3 SEBACEOUS CYST: Status: ACTIVE | Noted: 2020-01-01

## 2020-07-09 PROBLEM — N31.9 NEUROGENIC BLADDER: Status: ACTIVE | Noted: 2020-01-01

## 2020-07-09 PROBLEM — R11.2 NAUSEA AND VOMITING, INTRACTABILITY OF VOMITING NOT SPECIFIED, UNSPECIFIED VOMITING TYPE: Status: ACTIVE | Noted: 2020-01-01

## 2020-07-09 PROBLEM — E87.1 HYPONATREMIA: Status: ACTIVE | Noted: 2020-01-01

## 2020-07-09 PROBLEM — R42 DIZZINESS: Status: ACTIVE | Noted: 2020-01-01

## 2020-07-09 PROBLEM — R33.8 ACUTE URINARY RETENTION: Status: ACTIVE | Noted: 2020-01-01

## 2020-07-09 PROBLEM — I47.29 PAROXYSMAL VENTRICULAR TACHYCARDIA (H): Status: ACTIVE | Noted: 2020-01-01

## 2020-07-09 PROBLEM — E78.2 MIXED HYPERLIPIDEMIA: Status: ACTIVE | Noted: 2020-01-01

## 2020-07-09 PROBLEM — R26.9 GAIT DISTURBANCE: Status: ACTIVE | Noted: 2018-01-16

## 2020-07-09 PROBLEM — C44.91 BASAL CELL CARCINOMA (BCC): Status: ACTIVE | Noted: 2020-01-01

## 2020-07-09 PROBLEM — E83.42 HYPOMAGNESEMIA: Status: ACTIVE | Noted: 2020-01-01

## 2020-07-09 PROBLEM — J96.01 ACUTE RESPIRATORY FAILURE WITH HYPOXIA (H): Status: ACTIVE | Noted: 2020-01-01
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
## 2020-07-09 PROBLEM — M48.061 SPINAL STENOSIS OF LUMBAR REGION, UNSPECIFIED WHETHER NEUROGENIC CLAUDICATION PRESENT: Status: ACTIVE | Noted: 2020-01-01

## 2020-07-09 PROBLEM — E11.9 TYPE 2 DIABETES MELLITUS (H): Status: ACTIVE | Noted: 2020-01-01

## 2020-07-09 PROBLEM — C44.92 SQUAMOUS CELL CARCINOMA OF SKIN: Status: ACTIVE | Noted: 2020-01-01

## 2020-07-09 PROBLEM — G45.9 TRANSIENT ISCHEMIC ATTACK: Status: ACTIVE | Noted: 2020-01-01

## 2020-07-09 PROBLEM — J11.1 INFLUENZA: Status: ACTIVE | Noted: 2020-01-01

## 2020-07-09 PROBLEM — I21.9 ACUTE MYOCARDIAL INFARCTION (H): Status: ACTIVE | Noted: 2020-01-01

## 2020-07-09 PROBLEM — H90.6 MIXED CONDUCTIVE AND SENSORINEURAL HEARING LOSS, BILATERAL: Status: ACTIVE | Noted: 2020-01-01

## 2020-07-09 PROBLEM — R55 NEAR SYNCOPE: Status: ACTIVE | Noted: 2019-01-01

## 2020-07-09 PROBLEM — Z86.73 HISTORY OF CEREBROVASCULAR ACCIDENT: Status: ACTIVE | Noted: 2020-01-01

## 2020-07-09 PROBLEM — E78.00 HYPERCHOLESTEROLEMIA: Status: ACTIVE | Noted: 2020-01-01

## 2020-07-09 PROBLEM — F03.90 DEMENTIA (H): Status: ACTIVE | Noted: 2020-01-01

## 2020-07-09 PROBLEM — I25.5 ISCHEMIC CARDIOMYOPATHY: Status: ACTIVE | Noted: 2020-01-01

## 2020-07-09 PROBLEM — C43.4 MELANOMA OF SCALP (H): Status: ACTIVE | Noted: 2019-01-01

## 2020-07-09 PROBLEM — C44.629 SQUAMOUS CELL CARCINOMA OF LEFT HAND: Status: ACTIVE | Noted: 2017-07-05

## 2020-07-09 PROBLEM — H93.13 TINNITUS AURIUM, BILATERAL: Status: ACTIVE | Noted: 2020-01-01

## 2020-07-09 PROBLEM — E78.5 DYSLIPIDEMIA: Status: ACTIVE | Noted: 2019-01-01

## 2020-07-09 PROBLEM — C43.9 MALIGNANT MELANOMA OF SKIN (H): Status: ACTIVE | Noted: 2020-01-01

## 2020-07-09 PROBLEM — H02.102 ECTROPION OF BOTH LOWER EYELIDS: Status: ACTIVE | Noted: 2018-11-29

## 2020-07-09 PROBLEM — R07.9 CHEST PAIN: Status: ACTIVE | Noted: 2020-01-01

## 2020-07-09 PROBLEM — E11.40 TYPE 2 DIABETES MELLITUS WITH DIABETIC NEUROPATHY (H): Status: ACTIVE | Noted: 2020-01-01

## 2020-07-09 PROBLEM — E87.20 LACTIC ACIDOSIS: Status: ACTIVE | Noted: 2019-01-01

## 2020-07-09 PROBLEM — R26.89 IMBALANCE: Status: ACTIVE | Noted: 2018-01-16

## 2020-07-09 PROBLEM — M62.81 MUSCLE WEAKNESS (GENERALIZED): Status: ACTIVE | Noted: 2020-01-01

## 2020-07-09 PROBLEM — D64.9 NORMOCYTIC NORMOCHROMIC ANEMIA: Status: ACTIVE | Noted: 2020-01-01

## 2020-07-09 PROBLEM — R55 VASOVAGAL SYNCOPE: Status: ACTIVE | Noted: 2020-01-01

## 2020-07-09 PROBLEM — R26.81 UNSTEADINESS: Status: ACTIVE | Noted: 2019-01-01

## 2020-07-09 PROBLEM — R42 LIGHTHEADEDNESS: Status: ACTIVE | Noted: 2019-01-01

## 2020-07-09 PROBLEM — R53.1 GENERAL WEAKNESS: Status: ACTIVE | Noted: 2020-01-01

## 2020-07-09 PROBLEM — H02.105 ECTROPION OF BOTH LOWER EYELIDS: Status: ACTIVE | Noted: 2018-11-29

## 2020-07-09 PROBLEM — D72.829 LEUKOCYTOSIS: Status: ACTIVE | Noted: 2020-01-01

## 2020-07-13 PROBLEM — E86.0 DEHYDRATION: Status: RESOLVED | Noted: 2019-01-01 | Resolved: 2020-01-01

## 2020-07-13 PROBLEM — J96.01 ACUTE RESPIRATORY FAILURE WITH HYPOXIA (H): Status: RESOLVED | Noted: 2020-01-01 | Resolved: 2020-01-01

## 2020-07-13 PROBLEM — R26.89 IMBALANCE: Status: RESOLVED | Noted: 2018-01-16 | Resolved: 2020-01-01

## 2020-07-13 PROBLEM — D72.829 LEUKOCYTOSIS: Status: RESOLVED | Noted: 2020-01-01 | Resolved: 2020-01-01

## 2020-07-13 PROBLEM — E83.42 HYPOMAGNESEMIA: Status: RESOLVED | Noted: 2020-01-01 | Resolved: 2020-01-01

## 2020-07-13 PROBLEM — E87.1 HYPONATREMIA: Status: RESOLVED | Noted: 2020-01-01 | Resolved: 2020-01-01

## 2020-07-13 PROBLEM — R26.81 UNSTEADINESS: Status: RESOLVED | Noted: 2019-01-01 | Resolved: 2020-01-01

## 2020-07-13 PROBLEM — R42 LIGHTHEADEDNESS: Status: RESOLVED | Noted: 2019-01-01 | Resolved: 2020-01-01

## 2020-07-13 PROBLEM — R33.8 ACUTE URINARY RETENTION: Status: RESOLVED | Noted: 2020-01-01 | Resolved: 2020-01-01

## 2020-07-13 PROBLEM — J11.1 INFLUENZA: Status: RESOLVED | Noted: 2020-01-01 | Resolved: 2020-01-01

## 2020-07-13 PROBLEM — E11.40 TYPE 2 DIABETES MELLITUS WITH DIABETIC NEUROPATHY, WITHOUT LONG-TERM CURRENT USE OF INSULIN (H): Status: ACTIVE | Noted: 2020-01-01

## 2020-07-13 PROBLEM — R07.9 CHEST PAIN: Status: RESOLVED | Noted: 2020-01-01 | Resolved: 2020-01-01

## 2020-07-13 PROBLEM — E78.2 MIXED HYPERLIPIDEMIA: Status: RESOLVED | Noted: 2020-01-01 | Resolved: 2020-01-01

## 2020-07-13 PROBLEM — E87.20 LACTIC ACIDOSIS: Status: RESOLVED | Noted: 2019-01-01 | Resolved: 2020-01-01

## 2020-07-13 NOTE — LETTER
7/13/2020        RE: Levy Avelar  [address] [address]        [facility]  PRIMARY CARE PROVIDER AND CLINIC:  Heron Cramer MD, [address] [place] [address] / [facility] [address]  Chief Complaint   Patient presents with     Hospital F/U     [place] Medical Record Number:  [id number]  Place of Service where encounter took place:  [facility] ([facility]) [[id number]]    Levy Avelar  is a 80 year old  (1940), admitted to the above facility from home hospice program.  Plans long term care skilled nursing facility admission to better meet ongoing care needs and for ongoing hospice care.  Admitted to this facility for  hospice.    HPI:    HPI information obtained from: facility chart records, facility staff, patient report, [facility] chart review and Care Everywhere [facility] chart review.   Brief Summary of Hospital Course: Levy Avelar has a past medical history of dementia, CAD, DM2, GERD, CVA, hypertension, neurogenic bladder with indwelling rodriguez cath.  S/he was admitted to [facility] for long term care in the skilled nursing facility with [facility] support  Updates on Status Since Skilled nursing Admission: Levy reports no new concerns, he is able to answer simple questions (yes/no).  Nursing reports he requires assist of 2 for all transfers, has a small open area on coccyx, and was found on floor - having slid out of bed on 7/10/20.  He suffered no injuries from this fall.       CODE STATUS/ADVANCE DIRECTIVES DISCUSSION:   DNR / DNI  Patient's living condition: lives with family, in hospice program   ALLERGIES: Hydrocodone-acetaminophen; Adhesive tape; Amiodarone; Atorvastatin; Codeine; Lisinopril; Niacin; and Penicillins  PAST MEDICAL HISTORY:  has a past medical history of Acute MI (H), Acute myocardial infarction (H), CAD (coronary artery disease), CAD (coronary artery disease), Chest pain (7/9/2020), Diabetes (H), DM2 (diabetes mellitus, type 2) (H),  Former smoker, Frequent falls, Gait disturbance, History of chest pain, History of TIAs, HLD (hyperlipidemia), Hypercholesterolemia, Hypertension, ICD (implantable cardioverter-defibrillator), dual, in situ, Imbalance, Lumbar radiculopathy, Melanoma of scalp (H), Neuropathy, Neuropathy, Postherpetic neuralgia, Presence of combination internal cardiac defibrillator (ICD) and pacemaker, Senile ectropion of both lower eyelids, Squamous cell carcinoma of skin, Squamous cell skin cancer, STEMI (ST elevation myocardial infarction) (H) (2013), Stroke (H), TIA (transient ischemic attack), and Vasovagal syncope.  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  FAMILY HISTORY: family history is not on file. He was adopted.  SOCIAL HISTORY:   reports that he quit smoking about 35 years ago. His smoking use included cigarettes. He has a 94.00 pack-year smoking history. He has never used smokeless tobacco. He reports that he does not drink alcohol or use drugs.    Post Discharge Medication Reconciliation Status: discharge medications reconciled and changed, per note/orders (see AVS)    Current Outpatient Medications   Medication Sig Dispense Refill     acetaminophen (TYLENOL) 325 MG suppository Place 650 mg rectally as needed for fever       acetaminophen (TYLENOL) 325 MG tablet Take 650 mg by mouth every 4 hours as needed for mild pain       bisacodyl (DULCOLAX) 10 MG suppository Place 10 mg rectally daily as needed for constipation       hyoscyamine (LEVSIN) 0.125 MG tablet Take 0.125 mg by mouth every 4 hours as needed for cramping       LORazepam (ATIVAN) 0.5 MG tablet Take 0.5 mg by mouth every 4 hours as needed for anxiety        metFORMIN (GLUCOPHAGE) 1000 MG tablet Take 1,000 mg by mouth 2 times daily (with meals)       metoprolol tartrate (LOPRESSOR) 50 MG tablet Take 50 mg by mouth daily       morphine sulfate HIGH CONCENTRATE (ROXANOL) 20 mg/mL (HIGH CONC) solution Take 5 mg by mouth every 2 hours as needed for shortness of breath / dyspnea or breakthrough pain       omeprazole (PRILOSEC OTC) 20 MG EC tablet Take 20 mg by mouth daily       prochlorperazine (COMPAZINE) 10 MG tablet Take 10 mg by mouth every 6 hours as needed for nausea or vomiting       senna-docusate (SENOKOT-S/PERICOLACE) 8.6-50 MG tablet Take 1 tablet by mouth daily         ROS:  4 point ROS including Respiratory, CV, GI and , other than that noted in the HPI,  is negative    Vitals:  /68   Pulse 72   Temp 97.8  F (36.6  C)   Resp 16   Wt 62.6 kg (138 lb)   SpO2 100%   BMI 19.80 kg/m    Exam:  GENERAL APPEARANCE:  Alert, in no acute distress   HEAD:  Normal, normocephalic, atraumatic  ENT:  Mouth and posterior oropharynx normal, moist mucous membranes, hearing acuity - hard of hearing   EYE EXAM:  EOM, conjunctivae, lids, pupils and irises normal   CHEST/RESP:  respiratory effort normal, no respiratory distress, lung sounds CTA    CV:  Rate and rhythm reg, no murmur/rub/gallop, no peripheral edema  M/S:   extremities normal, gait abnormal-unable to ambulate and requires total assist with all ADLs, spends most of his time in reclining shan-chair, digits and nails within normal limits   NEUROLOGIC EXAM: Normal gross motor movement, tone and coordination. No tremor. Cranial nerves 2-12 are normal tested and grossly at patient's baseline  PSYCH:  Alert and oriented to self, unable to answer simple orientation questions, affect flat     Lab/Diagnostic data:  recent labs to include:  Hgb 11.4  Creat 1.0  BUN 14  A1C 6.2%    ASSESSMENT/PLAN:  Dementia without behavioral disturbance, unspecified dementia type (H)  Patient with history of dementia, on [facility] and plans long term placement in skilled nursing facility as he is no longer able to be cared for at home. He is able to answer some simple yes/no questions, but disoriented to location and new living situation . He requires total assist with all personal cares and safety measures.  He will be spending 14 days in the TCU in quarantine, then will move to a long term care bed to meet ongoing care needs.     Type 2 diabetes mellitus with diabetic neuropathy, without long-term current use of insulin (H)  Patient with history of DM2, unclear what most recent history has been, but note A1C of 6.2 % on 6/30/20 notes in records. No routine blood sugar checks needed.    -discontinue metformin    Cerebrovascular disease  History of CVA, no obvious sided weakness noted today.    Muscle weakness (generalized)  Recurrent falls  Found on floor 7/10/20, generally requires total assist with all cares and frequent safety checks    Neurogenic bladder  Rodriguez catheter in place  Patient with history of neurogenic bladder, with indwelling rodriguez in place; stable chronic condition    Normocytic normochromic anemia  Noted hemoglobin of 11.4 per history on 6/30/20.  No obvious dyspnea, no new concerns    Spinal stenosis of lumbar region, unspecified whether neurogenic claudication present  Per history with some reports of chronic back pain.  Has tylenol, morphine for prn use-using rarely.     Has been enrolled with hospice and has hyoscyamine, lorazepam, morphine, compazine available for prn use for symptom control.     No new orders       Electronically signed by:  CALLI Curtis CNP       Sincerely,        CALLI Curtis CNP

## 2020-07-13 NOTE — PROGRESS NOTES
[facility] [facility]  PRIMARY CARE PROVIDER AND CLINIC:  Heron Cramer MD, [address] / [place] [address]  Chief Complaint   Patient presents with     Hospital F/U     [place] Medical Record Number:  [id number]  Place of Service where encounter took place:  [facility] ([facility] [[id number]]    Levy Avelar  is a 80 year old  (1940), admitted to the above facility from home hospice program.  Plans long term care skilled nursing facility admission to better meet ongoing care needs and for ongoing hospice care.  Admitted to this facility for  hospice.    HPI:    HPI information obtained from: facility chart records, facility staff, patient report, [place] Epic chart review and Care Everywhere Epic chart review.   Brief Summary of Hospital Course: Levy Avelar has a past medical history of dementia, CAD, DM2, GERD, CVA, hypertension, neurogenic bladder with indwelling rodriguez cath.  S/he was admitted to [facility] for long term care in the skilled nursing facility with [facility] support  Updates on Status Since Skilled nursing Admission: Levy reports no new concerns, he is able to answer simple questions (yes/no).  Nursing reports he requires assist of 2 for all transfers, has a small open area on coccyx, and was found on floor - having slid out of bed on 7/10/20.  He suffered no injuries from this fall.       CODE STATUS/ADVANCE DIRECTIVES DISCUSSION:   DNR / DNI  Patient's living condition: lives with family, in hospice program   ALLERGIES: Hydrocodone-acetaminophen; Adhesive tape; Amiodarone; Atorvastatin; Codeine; Lisinopril; Niacin; and Penicillins  PAST MEDICAL HISTORY:  has a past medical history of Acute MI (H), Acute myocardial infarction (H), CAD (coronary artery disease), CAD (coronary artery disease), Chest pain (7/9/2020), Diabetes (H), DM2 (diabetes mellitus, type 2) (H), Former smoker, Frequent falls, Gait disturbance, History of chest pain, History of  TIAs, HLD (hyperlipidemia), Hypercholesterolemia, Hypertension, ICD (implantable cardioverter-defibrillator), dual, in situ, Imbalance, Lumbar radiculopathy, Melanoma of scalp (H), Neuropathy, Neuropathy, Postherpetic neuralgia, Presence of combination internal cardiac defibrillator (ICD) and pacemaker, Senile ectropion of both lower eyelids, Squamous cell carcinoma of skin, Squamous cell skin cancer, STEMI (ST elevation myocardial infarction) (H) (2013), Stroke (H), TIA (transient ischemic attack), and Vasovagal syncope.  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  FAMILY HISTORY: family history is not on file. He was adopted.  SOCIAL HISTORY:   reports that he quit smoking about 35 years ago. His smoking use included cigarettes. He has a 94.00 pack-year smoking history. He has never used smokeless tobacco. He reports that he does not drink alcohol or use drugs.    Post Discharge Medication Reconciliation Status: discharge medications reconciled and changed, per note/orders (see AVS)    Current Outpatient Medications   Medication Sig Dispense Refill     acetaminophen (TYLENOL) 325 MG suppository Place 650 mg rectally as needed for fever       acetaminophen (TYLENOL) 325 MG tablet Take 650 mg by mouth every 4 hours as needed for mild pain       bisacodyl (DULCOLAX) 10 MG suppository Place 10 mg rectally daily as needed for constipation       hyoscyamine (LEVSIN) 0.125 MG tablet Take 0.125 mg by mouth every 4 hours as needed for cramping       LORazepam (ATIVAN) 0.5 MG tablet Take 0.5 mg by mouth every 4 hours as needed for anxiety       metFORMIN (GLUCOPHAGE) 1000 MG tablet Take 1,000 mg by mouth 2 times daily (with  meals)       metoprolol tartrate (LOPRESSOR) 50 MG tablet Take 50 mg by mouth daily       morphine sulfate HIGH CONCENTRATE (ROXANOL) 20 mg/mL (HIGH CONC) solution Take 5 mg by mouth every 2 hours as needed for shortness of breath / dyspnea or breakthrough pain       omeprazole (PRILOSEC OTC) 20 MG EC tablet Take 20 mg by mouth daily       prochlorperazine (COMPAZINE) 10 MG tablet Take 10 mg by mouth every 6 hours as needed for nausea or vomiting       senna-docusate (SENOKOT-S/PERICOLACE) 8.6-50 MG tablet Take 1 tablet by mouth daily         ROS:  4 point ROS including Respiratory, CV, GI and , other than that noted in the HPI,  is negative    Vitals:  /68   Pulse 72   Temp 97.8  F (36.6  C)   Resp 16   Wt 62.6 kg (138 lb)   SpO2 100%   BMI 19.80 kg/m    Exam:  GENERAL APPEARANCE:  Alert, in no acute distress   HEAD:  Normal, normocephalic, atraumatic  ENT:  Mouth and posterior oropharynx normal, moist mucous membranes, hearing acuity - hard of hearing   EYE EXAM:  EOM, conjunctivae, lids, pupils and irises normal   CHEST/RESP:  respiratory effort normal, no respiratory distress, lung sounds CTA    CV:  Rate and rhythm reg, no murmur/rub/gallop, no peripheral edema  M/S:   extremities normal, gait abnormal-unable to ambulate and requires total assist with all ADLs, spends most of his time in reclining shan-chair, digits and nails within normal limits   NEUROLOGIC EXAM: Normal gross motor movement, tone and coordination. No tremor. Cranial nerves 2-12 are normal tested and grossly at patient's baseline  PSYCH:  Alert and oriented to self, unable to answer simple orientation questions, affect flat     Lab/Diagnostic data:  recent labs to include:  Hgb 11.4  Creat 1.0  BUN 14  A1C 6.2%    ASSESSMENT/PLAN:  Dementia without behavioral disturbance, unspecified dementia type (H)  Patient with history of dementia, on [facility] and plans long term placement in skilled nursing facility as he is no longer  able to be cared for at home. He is able to answer some simple yes/no questions, but disoriented to location and new living situation . He requires total assist with all personal cares and safety measures.  He will be spending 14 days in the TCU in quarantine, then will move to a long term care bed to meet ongoing care needs.     Type 2 diabetes mellitus with diabetic neuropathy, without long-term current use of insulin (H)  Patient with history of DM2, unclear what most recent history has been, but note A1C of 6.2 % on 6/30/20 notes in records. No routine blood sugar checks needed.    -discontinue metformin    Cerebrovascular disease  History of CVA, no obvious sided weakness noted today.    Muscle weakness (generalized)  Recurrent falls  Found on floor 7/10/20, generally requires total assist with all cares and frequent safety checks    Neurogenic bladder  Rodriguez catheter in place  Patient with history of neurogenic bladder, with indwelling rodriguez in place; stable chronic condition    Normocytic normochromic anemia  Noted hemoglobin of 11.4 per history on 6/30/20.  No obvious dyspnea, no new concerns    Spinal stenosis of lumbar region, unspecified whether neurogenic claudication present  Per history with some reports of chronic back pain.  Has tylenol, morphine for prn use-using rarely.     Has been enrolled with hospice and has hyoscyamine, lorazepam, morphine, compazine available for prn use for symptom control.     No new orders       Electronically signed by:  CALLI Curtis CNP

## 2020-07-20 NOTE — PROGRESS NOTES
" [facility]  Levy Avelar is being evaluated via a billable video visit due to the restrictions of the Covid-19 pandemic.   The patient has been notified of following:  \"This video visit will be conducted via a call between you and your provider. We have found that certain health care needs can be provided without the need for an in-person physical exam.  This service lets us provide the care you need with a video conversation. If during the course of the call the provider feels a video visit is not appropriate, you will not be charged for this service.\"   The provider has received verbal consent for a Video Visit from the patient and or first contact? Yes  Patient/facility staff would like the video invitation sent by: N/A   Video Start Time: 12:28  Which Facility the Patient [facility] the [facility] of visit[facility]  PRIMARY CARE PROVIDER AND CLINIC: Heron Cramer MD, [address] / [place] [address]  Chief Complaint   Patient presents with     Hospital F/U     [place] Medical Record Number: [id number]  Levy Avelar is a 80 year old (1940), admitted to the above facility from home hospice program. Plans long term care skilled nursing facility admission to better meet ongoing care needs and for ongoing hospice care. Admitted to this facility for hospice.  HPI:    HPI information obtained from: facility staff, patient report and [facility] chart review.   Briefly:  - Pt with PMH notable for dementia,T2D, CAD. Hospice patient, transferred to this facility 2/2 increase care needs.     Today:  - \" I am coughing all the time, once in a while bring up a phlegm yellow. No wheezing, or chest pain. Food makes my cough gets worse with food, on regular diet. I do not know if any specific food does contribute\" RN reports he has lots of snacks in his room, my daughter brings me snack and I have no choice but to eat.\"       CODE STATUS/ADVANCE DIRECTIVES DISCUSSION: DNR / " DNI  Patient's living condition: lives with family, with assistance from hospice program   ALLERGIES: Adhesive tape; Amiodarone; Atorvastatin; Codeine; Hydrocodone-acetaminophen; Lisinopril; Niacin; and Penicillins  PAST MEDICAL HISTORY:  has a past medical history of Acute MI (H), Acute myocardial infarction (H), CAD (coronary artery disease), CAD (coronary artery disease), Chest pain (7/9/2020), Diabetes (H), DM2 (diabetes mellitus, type 2) (H), Former smoker, Frequent falls, Gait disturbance, History of chest pain, History of TIAs, HLD (hyperlipidemia), Hypercholesterolemia, Hypertension, ICD (implantable cardioverter-defibrillator), dual, in situ, Imbalance, Lumbar radiculopathy, Melanoma of scalp (H), Neuropathy, Neuropathy, Postherpetic neuralgia, Presence of combination internal cardiac defibrillator (ICD) and pacemaker, Senile ectropion of both lower eyelids, Squamous cell carcinoma of skin, Squamous cell skin cancer, STEMI (ST elevation myocardial infarction) (H) (2013), Stroke (H), TIA (transient ischemic attack), and Vasovagal syncope.  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  FAMILY HISTORY: family history is not on file. He was adopted.  SOCIAL HISTORY:   reports that he quit smoking about 35 years ago. His smoking use included cigarettes. He has a 94.00 pack-year smoking history. He has never used smokeless tobacco. He reports that he does not drink alcohol or use drugs.  Current Outpatient Medications   Medication Sig Dispense Refill     acetaminophen (TYLENOL) 325 MG suppository Place 650 mg rectally as needed for fever       acetaminophen (TYLENOL) 325 MG tablet Take 650 mg by mouth every 4 hours  "as needed for mild pain       bisacodyl (DULCOLAX) 10 MG suppository Place 10 mg rectally daily as needed for constipation       hyoscyamine (LEVSIN) 0.125 MG tablet Take 0.125 mg by mouth every 4 hours as needed for cramping       LORazepam (ATIVAN) 0.5 MG tablet Take 0.5 mg by mouth every 4 hours as needed for anxiety       metFORMIN (GLUCOPHAGE) 1000 MG tablet Take 1,000 mg by mouth 2 times daily (with meals)       metoprolol tartrate (LOPRESSOR) 50 MG tablet Take 50 mg by mouth daily       morphine sulfate HIGH CONCENTRATE (ROXANOL) 20 mg/mL (HIGH CONC) solution Take 5 mg by mouth every 2 hours as needed for shortness of breath / dyspnea or breakthrough pain       omeprazole (PRILOSEC OTC) 20 MG EC tablet Take 20 mg by mouth daily       prochlorperazine (COMPAZINE) 10 MG tablet Take 10 mg by mouth every 6 hours as needed for nausea or vomiting       senna-docusate (SENOKOT-S/PERICOLACE) 8.6-50 MG tablet Take 1 tablet by mouth daily       ROS: 10 point ROS of systems including Constitutional, Eyes, Respiratory, Cardiovascular, Gastroenterology, Genitourinary, Integumentary, Musculoskeletal, Psychiatric were all negative except for pertinent positives noted in my HPI.  Vitals:BP 92/50   Pulse 70   Temp 98.5  F (36.9  C)   Resp 16   Ht 1.778 m (5' 10\")   Wt 57.6 kg (127 lb)   SpO2 93%   BMI 18.22 kg/m     Limited Visit Exam done given COVID-19 precautions:  GENERAL APPEARANCE:  in no distress  RESP:  unlabored breathing  M/S:   no joint deformity noted  SKIN:  no rash noted  NEURO:   no purposeful movement in upper and lower extremities  PSYCH:  affect and mood normal    Lab/Diagnostic data: Reviewed in the chart and EHR.      ASSESSMENT/PLAN:  ------------------------------  Dementia without behavioral disturbance, unspecified dementia type (H)  Hospice Care  - Continue to anticipate needs. Chronic condition, ongoing decline expected.   -  Continue to provide redirection and reassurance as needed. Maintain " safe living situation with goals focused on comfort.  - Declining, appreciate collaboration with  hospice team for symptom management in collaboration with cares for maximum comfort at end-of-life    Debility  Significant  Deficits requiring NH placement. Requiring extensive assistance from nursing.     Type 2 diabetes mellitus with diabetic neuropathy, without long-term current use of insulin (H)  - No results found for: A1C   - In this frail elderly adult with a limited life expectancy, the goal of  the management is to address the hyperglycemia sx if any,  rather than the  BGs numbers per se. Currently on metformin. Monitor.     CAD involving native heart w/o angina pectoris, unspecified vessel or lesion type,   Hx of syncope and ventricular tachycardia s/p  Dual ICD  EF 34-40% (Nov 2017)  - on metoprolol. Monitor.     Neurogenic bladder  Akins catheter in place  - standard care.     Spinal stenosis of lumbar region, unspecified whether neurogenic claudication present  - analgesia optimal.       Clay Terminal Ulcer: wound care. A challenged ulcer to treat.     Cough: counseled to cut down on pop corn and any food makes him cough.       Nausae: on compazine for nausea. Consider switching to zofran.     Order: See above, otherwise, continue the rest of the current POC.     Electronically signed by:  Heron Cramer MD     Video-Visit Details  Type of service:  Video Visit  Video End Time (time video stopped): 12:33  Distant Location (provider location):  [place] [facility]

## 2020-07-21 NOTE — LETTER
"    7/21/2020        RE: Levy Avelar  [address] [facility] [address]         [facility]  Levy Avelar is being evaluated via a billable video visit due to the restrictions of the Covid-19 pandemic.   The patient has been notified of following:  \"This video visit will be conducted via a call between you and your provider. We have found that certain health care needs can be provided without the need for an in-person physical exam.  This service lets us provide the care you need with a video conversation. If during the course of the call the provider feels a video visit is not appropriate, you will not be charged for this service.\"   The provider has received verbal consent for a Video Visit from the patient and or first contact? Yes  Patient/facility staff would like the video invitation sent by: N/A   Video Start Time: 12:28  Which Facility the Patient is at during the time of visit: [facility]  PRIMARY CARE PROVIDER AND CLINIC: Heron Cramer MD, [address] [place] [address] / [facility] [id number]  Chief Complaint   Patient presents with     Hospital F/U     [facility] Medical Record Number: [id number]  Levy Avelar is a 80 year old (1940), admitted to the above facility from home hospice program. Plans long term care skilled nursing facility admission to better meet ongoing care needs and for ongoing hospice care. Admitted to this facility for hospice.  HPI:    HPI information obtained from: facility staff, patient report and [facility] chart review.   Briefly:  - Pt with PMH notable for dementia,T2D, CAD. Hospice patient, transferred to this facility 2/2 increase care needs.     Today:  - \" I am coughing all the time, once in a while bring up a phlegm yellow. No wheezing, or chest pain. Food makes my cough gets worse with food, on regular diet. I do not know if any specific food does contribute\" RN reports he has lots of snacks in his room, my daughter brings me snack and I have no " "choice but to eat.\"       CODE STATUS/ADVANCE DIRECTIVES DISCUSSION: DNR / DNI  Patient's living condition: lives with family, with assistance from hospice program   ALLERGIES: Adhesive tape; Amiodarone; Atorvastatin; Codeine; Hydrocodone-acetaminophen; Lisinopril; Niacin; and Penicillins  PAST MEDICAL HISTORY:  has a past medical history of Acute MI (H), Acute myocardial infarction (H), CAD (coronary artery disease), CAD (coronary artery disease), Chest pain (7/9/2020), Diabetes (H), DM2 (diabetes mellitus, type 2) (H), Former smoker, Frequent falls, Gait disturbance, History of chest pain, History of TIAs, HLD (hyperlipidemia), Hypercholesterolemia, Hypertension, ICD (implantable cardioverter-defibrillator), dual, in situ, Imbalance, Lumbar radiculopathy, Melanoma of scalp (H), Neuropathy, Neuropathy, Postherpetic neuralgia, Presence of combination internal cardiac defibrillator (ICD) and pacemaker, Senile ectropion of both lower eyelids, Squamous cell carcinoma of skin, Squamous cell skin cancer, STEMI (ST elevation myocardial infarction) (H) (2013), Stroke (H), TIA (transient ischemic attack), and Vasovagal syncope.  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  FAMILY HISTORY: family history is not on file. He was adopted.  SOCIAL HISTORY:   reports that he quit smoking about 35 years ago. His smoking use included cigarettes. He has a 94.00 pack-year smoking history. He has never used smokeless tobacco. He reports that he does not drink alcohol or use drugs.  Current Outpatient Medications   Medication Sig Dispense Refill     acetaminophen (TYLENOL) 325 MG suppository Place 650 mg rectally as needed for fever   " "    acetaminophen (TYLENOL) 325 MG tablet Take 650 mg by mouth every 4 hours as needed for mild pain       bisacodyl (DULCOLAX) 10 MG suppository Place 10 mg rectally daily as needed for constipation       hyoscyamine (LEVSIN) 0.125 MG tablet Take 0.125 mg by mouth every 4 hours as needed for cramping       LORazepam (ATIVAN) 0.5 MG tablet Take 0.5 mg by mouth every 4 hours as needed for anxiety       metFORMIN (GLUCOPHAGE) 1000 MG tablet Take 1,000 mg by mouth 2 times daily (with meals)       metoprolol tartrate (LOPRESSOR) 50 MG tablet Take 50 mg by mouth daily       morphine sulfate HIGH CONCENTRATE (ROXANOL) 20 mg/mL (HIGH CONC) solution Take 5 mg by mouth every 2 hours as needed for shortness of breath / dyspnea or breakthrough pain       omeprazole (PRILOSEC OTC) 20 MG EC tablet Take 20 mg by mouth daily       prochlorperazine (COMPAZINE) 10 MG tablet Take 10 mg by mouth every 6 hours as needed for nausea or vomiting       senna-docusate (SENOKOT-S/PERICOLACE) 8.6-50 MG tablet Take 1 tablet by mouth daily       ROS: 10 point ROS of systems including Constitutional, Eyes, Respiratory, Cardiovascular, Gastroenterology, Genitourinary, Integumentary, Musculoskeletal, Psychiatric were all negative except for pertinent positives noted in my HPI.  Vitals:BP 92/50   Pulse 70   Temp 98.5  F (36.9  C)   Resp 16   Ht 1.778 m (5' 10\")   Wt 57.6 kg (127 lb)   SpO2 93%   BMI 18.22 kg/m     Limited Visit Exam done given COVID-19 precautions:  GENERAL APPEARANCE:  in no distress  RESP:  unlabored breathing  M/S:   no joint deformity noted  SKIN:  no rash noted  NEURO:   no purposeful movement in upper and lower extremities  PSYCH:  affect and mood normal    Lab/Diagnostic data: Reviewed in the chart and EHR.      ASSESSMENT/PLAN:  ------------------------------  Dementia without behavioral disturbance, unspecified dementia type (H)  Hospice Care  - Continue to anticipate needs. Chronic condition, ongoing decline " expected.   -  Continue to provide redirection and reassurance as needed. Maintain safe living situation with goals focused on comfort.  - Declining, appreciate collaboration with  hospice team for symptom management in collaboration with cares for maximum comfort at end-of-life    Debility  Significant  Deficits requiring NH placement. Requiring extensive assistance from nursing.     Type 2 diabetes mellitus with diabetic neuropathy, without long-term current use of insulin (H)  - No results found for: A1C   - In this frail elderly adult with a limited life expectancy, the goal of  the management is to address the hyperglycemia sx if any,  rather than the  BGs numbers per se. Currently on metformin. Monitor.     CAD involving native heart w/o angina pectoris, unspecified vessel or lesion type,   Hx of syncope and ventricular tachycardia s/p  Dual ICD  EF 34-40% (Nov 2017)  - on metoprolol. Monitor.     Neurogenic bladder  Akins catheter in place  - standard care.     Spinal stenosis of lumbar region, unspecified whether neurogenic claudication present  - analgesia optimal.       Clay Terminal Ulcer: wound care. A challenged ulcer to treat.     Cough: counseled to cut down on pop corn and any food makes him cough.       Nausae: on compazine for nausea. Consider switching to zofran.     Order: See above, otherwise, continue the rest of the current POC.     Electronically signed by:  Heron Cramer MD     Video-Visit Details  Type of service:  Video Visit  Video End Time (time video stopped): 12:33  Distant Location (provider location):  [facility]       Sincerely,        Heron Cramer MD

## 2021-05-20 ENCOUNTER — COMMUNICATION - HEALTHEAST (OUTPATIENT)
Dept: CARDIOLOGY | Facility: CLINIC | Age: 81
End: 2021-05-20

## 2021-05-24 ENCOUNTER — RECORDS - HEALTHEAST (OUTPATIENT)
Dept: ADMINISTRATIVE | Facility: CLINIC | Age: 81
End: 2021-05-24

## 2021-05-26 VITALS
DIASTOLIC BLOOD PRESSURE: 78 MMHG | OXYGEN SATURATION: 99 % | RESPIRATION RATE: 18 BRPM | HEART RATE: 77 BPM | TEMPERATURE: 98.6 F | SYSTOLIC BLOOD PRESSURE: 124 MMHG

## 2021-05-26 VITALS
OXYGEN SATURATION: 95 % | SYSTOLIC BLOOD PRESSURE: 130 MMHG | RESPIRATION RATE: 16 BRPM | DIASTOLIC BLOOD PRESSURE: 60 MMHG | HEART RATE: 62 BPM | TEMPERATURE: 98.6 F

## 2021-05-26 VITALS
OXYGEN SATURATION: 96 % | DIASTOLIC BLOOD PRESSURE: 62 MMHG | SYSTOLIC BLOOD PRESSURE: 120 MMHG | TEMPERATURE: 97 F | HEART RATE: 60 BPM

## 2021-05-26 VITALS
RESPIRATION RATE: 16 BRPM | HEART RATE: 63 BPM | SYSTOLIC BLOOD PRESSURE: 120 MMHG | DIASTOLIC BLOOD PRESSURE: 64 MMHG | OXYGEN SATURATION: 97 % | TEMPERATURE: 97.7 F

## 2021-05-26 VITALS
TEMPERATURE: 98.1 F | SYSTOLIC BLOOD PRESSURE: 100 MMHG | DIASTOLIC BLOOD PRESSURE: 60 MMHG | HEART RATE: 54 BPM | RESPIRATION RATE: 16 BRPM | OXYGEN SATURATION: 96 %

## 2021-05-26 VITALS
DIASTOLIC BLOOD PRESSURE: 60 MMHG | TEMPERATURE: 97.8 F | RESPIRATION RATE: 16 BRPM | SYSTOLIC BLOOD PRESSURE: 120 MMHG | OXYGEN SATURATION: 98 % | HEART RATE: 69 BPM

## 2021-05-26 VITALS
SYSTOLIC BLOOD PRESSURE: 118 MMHG | RESPIRATION RATE: 18 BRPM | OXYGEN SATURATION: 100 % | HEART RATE: 64 BPM | DIASTOLIC BLOOD PRESSURE: 60 MMHG | TEMPERATURE: 97.8 F

## 2021-05-26 VITALS — SYSTOLIC BLOOD PRESSURE: 129 MMHG | DIASTOLIC BLOOD PRESSURE: 65 MMHG | OXYGEN SATURATION: 97 % | HEART RATE: 63 BPM

## 2021-05-26 VITALS
OXYGEN SATURATION: 97 % | SYSTOLIC BLOOD PRESSURE: 118 MMHG | DIASTOLIC BLOOD PRESSURE: 64 MMHG | HEART RATE: 71 BPM | TEMPERATURE: 97.8 F | RESPIRATION RATE: 17 BRPM

## 2021-05-26 VITALS
HEART RATE: 75 BPM | DIASTOLIC BLOOD PRESSURE: 66 MMHG | RESPIRATION RATE: 16 BRPM | TEMPERATURE: 98.6 F | OXYGEN SATURATION: 97 % | SYSTOLIC BLOOD PRESSURE: 122 MMHG

## 2021-05-26 VITALS
DIASTOLIC BLOOD PRESSURE: 60 MMHG | OXYGEN SATURATION: 98 % | SYSTOLIC BLOOD PRESSURE: 110 MMHG | TEMPERATURE: 97.8 F | HEART RATE: 66 BPM

## 2021-05-26 VITALS
DIASTOLIC BLOOD PRESSURE: 60 MMHG | OXYGEN SATURATION: 98 % | SYSTOLIC BLOOD PRESSURE: 120 MMHG | TEMPERATURE: 98.4 F | HEART RATE: 53 BPM

## 2021-05-26 VITALS
DIASTOLIC BLOOD PRESSURE: 64 MMHG | OXYGEN SATURATION: 93 % | SYSTOLIC BLOOD PRESSURE: 114 MMHG | HEART RATE: 99 BPM | TEMPERATURE: 98.2 F

## 2021-05-26 VITALS — HEART RATE: 70 BPM | DIASTOLIC BLOOD PRESSURE: 56 MMHG | SYSTOLIC BLOOD PRESSURE: 98 MMHG | OXYGEN SATURATION: 94 %

## 2021-05-26 VITALS
TEMPERATURE: 97.8 F | HEART RATE: 70 BPM | SYSTOLIC BLOOD PRESSURE: 122 MMHG | OXYGEN SATURATION: 93 % | DIASTOLIC BLOOD PRESSURE: 62 MMHG

## 2021-05-26 VITALS — DIASTOLIC BLOOD PRESSURE: 58 MMHG | SYSTOLIC BLOOD PRESSURE: 101 MMHG | HEART RATE: 73 BPM

## 2021-05-26 VITALS — OXYGEN SATURATION: 99 % | SYSTOLIC BLOOD PRESSURE: 120 MMHG | HEART RATE: 70 BPM | DIASTOLIC BLOOD PRESSURE: 58 MMHG

## 2021-05-27 VITALS
TEMPERATURE: 97.7 F | SYSTOLIC BLOOD PRESSURE: 139 MMHG | HEART RATE: 62 BPM | OXYGEN SATURATION: 98 % | DIASTOLIC BLOOD PRESSURE: 62 MMHG

## 2021-05-27 VITALS
DIASTOLIC BLOOD PRESSURE: 70 MMHG | SYSTOLIC BLOOD PRESSURE: 124 MMHG | OXYGEN SATURATION: 90 % | HEART RATE: 64 BPM | TEMPERATURE: 97.5 F

## 2021-05-27 VITALS — SYSTOLIC BLOOD PRESSURE: 129 MMHG | DIASTOLIC BLOOD PRESSURE: 74 MMHG | HEART RATE: 61 BPM

## 2021-05-27 VITALS
HEART RATE: 62 BPM | OXYGEN SATURATION: 98 % | TEMPERATURE: 98.5 F | DIASTOLIC BLOOD PRESSURE: 60 MMHG | SYSTOLIC BLOOD PRESSURE: 133 MMHG

## 2021-05-27 VITALS — HEART RATE: 64 BPM | SYSTOLIC BLOOD PRESSURE: 121 MMHG | DIASTOLIC BLOOD PRESSURE: 62 MMHG | OXYGEN SATURATION: 96 %

## 2021-05-27 VITALS — HEART RATE: 60 BPM | DIASTOLIC BLOOD PRESSURE: 60 MMHG | SYSTOLIC BLOOD PRESSURE: 117 MMHG

## 2021-05-27 VITALS
HEART RATE: 58 BPM | TEMPERATURE: 98.2 F | OXYGEN SATURATION: 98 % | RESPIRATION RATE: 16 BRPM | SYSTOLIC BLOOD PRESSURE: 120 MMHG | DIASTOLIC BLOOD PRESSURE: 70 MMHG

## 2021-05-27 VITALS
HEART RATE: 70 BPM | SYSTOLIC BLOOD PRESSURE: 130 MMHG | RESPIRATION RATE: 16 BRPM | TEMPERATURE: 98.3 F | OXYGEN SATURATION: 93 % | DIASTOLIC BLOOD PRESSURE: 64 MMHG

## 2021-05-27 VITALS
SYSTOLIC BLOOD PRESSURE: 112 MMHG | HEART RATE: 66 BPM | DIASTOLIC BLOOD PRESSURE: 64 MMHG | OXYGEN SATURATION: 92 % | TEMPERATURE: 97.2 F

## 2021-05-27 VITALS
SYSTOLIC BLOOD PRESSURE: 120 MMHG | HEART RATE: 90 BPM | RESPIRATION RATE: 16 BRPM | TEMPERATURE: 97.7 F | OXYGEN SATURATION: 97 % | DIASTOLIC BLOOD PRESSURE: 60 MMHG

## 2021-05-27 VITALS
TEMPERATURE: 97.8 F | DIASTOLIC BLOOD PRESSURE: 69 MMHG | HEART RATE: 66 BPM | SYSTOLIC BLOOD PRESSURE: 130 MMHG | OXYGEN SATURATION: 98 %

## 2021-05-27 VITALS
SYSTOLIC BLOOD PRESSURE: 128 MMHG | OXYGEN SATURATION: 98 % | DIASTOLIC BLOOD PRESSURE: 58 MMHG | TEMPERATURE: 98.5 F | HEART RATE: 60 BPM

## 2021-05-27 VITALS
TEMPERATURE: 97 F | SYSTOLIC BLOOD PRESSURE: 130 MMHG | RESPIRATION RATE: 18 BRPM | DIASTOLIC BLOOD PRESSURE: 60 MMHG | OXYGEN SATURATION: 98 % | HEART RATE: 60 BPM

## 2021-05-27 VITALS — DIASTOLIC BLOOD PRESSURE: 65 MMHG | TEMPERATURE: 96.7 F | SYSTOLIC BLOOD PRESSURE: 104 MMHG

## 2021-05-27 VITALS
SYSTOLIC BLOOD PRESSURE: 124 MMHG | TEMPERATURE: 97.3 F | OXYGEN SATURATION: 93 % | HEART RATE: 56 BPM | DIASTOLIC BLOOD PRESSURE: 60 MMHG

## 2021-05-27 VITALS
DIASTOLIC BLOOD PRESSURE: 52 MMHG | OXYGEN SATURATION: 99 % | RESPIRATION RATE: 18 BRPM | HEART RATE: 69 BPM | SYSTOLIC BLOOD PRESSURE: 130 MMHG | TEMPERATURE: 97.3 F

## 2021-05-27 VITALS
RESPIRATION RATE: 18 BRPM | DIASTOLIC BLOOD PRESSURE: 52 MMHG | SYSTOLIC BLOOD PRESSURE: 130 MMHG | HEART RATE: 69 BPM | TEMPERATURE: 97.3 F | OXYGEN SATURATION: 99 %

## 2021-05-27 VITALS — SYSTOLIC BLOOD PRESSURE: 133 MMHG | HEART RATE: 66 BPM | DIASTOLIC BLOOD PRESSURE: 78 MMHG

## 2021-05-27 NOTE — PROGRESS NOTES
Physical Therapy NPH Follow-Up     Levy URBANO Parth   4/17/2020      Patient returns for follow-up appointment after lumbar puncture to asses change in balance, gait, and functional mobility outcomes after CSF drainage, in order to assist in determining if possible shunt placement is indicated in treatment of NPH.     Subjective-    Patient Comments: Pt thought the walking was about the same.  His family member thought the pt's foot steps were higher and less shuffling yesterday.  His wife thought he did not get up to use the bathroom as much last night.      Objective-    Pain: No    Gait Speed:   Gait speed was .51 meters per second, indicating decreased speed and patient is at risk for falls. This was .05 meters/sec faster. No minimal detectable change. Pt did use the walker for this test.      Gait Speed Interpretation   >1.2 m/sec Independent in all activities and crossing street   0.8-1.0 m/sec Community ambulator, household activities. May need intervention to reduce falls risk.   0.6-0.8 m/sec Performs self care, limited community ambulator. May need intervention to reduce falls risk.   <0.6 m/sec Dependent in ADLs, household ambulator. Needs intervention to reduce falls risk.    Minimal Detectable Change for preferred gait (PD) = 0.18 meters/sec      Timed Up & Go (TUG) Balance Assessment: Average 19.82 sec  1.21 seconds faster. Pt did move from indicates impaired functional mobility, may need an assistive device to indicates increased risk for falling in the community older adults but no minimal detectable change for the types of pt's listed below.  Pt did use the walker for the test.  Pt did take larger, higher  steps with the walker with testing today.  He does  the walker with turning.  His feet did pass each other and steps became smaller with increased fatigue. Please note that this test was completed first.  Pt may have been less fatigued as well.     .     Trial 1 19.93 sec; Trial 2 20.06  sec; Trial 3 19.47 sec       Gait assistive device used: rolling walker    Time  Interpretation   <10 seconds Freely mobile   >13.5 seconds Indicates increased risk for falling in community dwelling older adults   >20 seconds Indicates impaired functional mobility; may need an assistive device   >30 seconds Indicates dependency in most ADL and mobility skills   The TUG is a test of basic mobility skills.  It is used to screen individuals prone to falls.    Minimal Detectable Change for patients with Alzheimzer s = 4.09 sec    Minimal Detectable Change for patients with Parkinson s Disease = 3.5 sec      Five Times Sit to Stand Test: (FTSST) The FTSTST measures functional LE strength and provides information about postural control during transitions to/from standing.    Patient Score: 15.12.  3.14 seconds better but pt did fall back into the chair x 3 of the 5 reps with stand >sit.  Faster but not better quality    Did patient require use of hands to complete? Yes (If yes, the test is not valid; however, still gives objective measurement of function)  Pt did use his hands for the test.  Pt did try the test without his hands and was not able to stand.      Interpretation of Results: > 16.0 seconds indicates risk of falls.    Average score (diagnosis of PD) =9.67 seconds (range = 5.9 - 13.5 sec)    Minimal Detectable Change = 2.5 sec    Minimally Clinical Important Difference = 2.3 sec    Gait quality:  Pt did use the RW for the TUG and the speed tests.  Pt did take larger, higher steps over all, with and without the walker for a longer time .His feet did clear the floor more but as he did fatigue more, his steps did become smaller and shuffled more.  He did not catch his feet as much but again with increased fatigue he did catch them with swing phase, R>L.  Pt did walk 300 without AD with CGA.  He seemed to take larger steps than yesterday and his steps did become smaller with increased walking. .  He did catch the  right LE with swing phase and he had a LOB and PT did assist x1 episode today..  Pt did have 3 episodes yesterday with needing increased assist due to LOB.  Pt still continues to sit down to far from the chair but he did not catch the right foot today.  He did not get stuck with his feet trying to sit down. PT does recommend the pt continue to use the RW at MT.  Pt did walk 20'x3, 200' x3 with the RW, and 300' without AD.                Additional Training/Comments: PT did spend a significant time reviewing results from the tests and looked at some results from last year.  They has a lot of questions and PT did tell them they should as the MD some of those questions.  PT did educate the pt on safety with transfers and to get closer to the chair before sitting.  PT did educate on bathroom concerns as the pt's wife had a lot of questions about that.  PT did recommend the may want to sit on the toilet instead of standing or use a urinal at night to help maybe prevent falls in the bathroom at night.  Pt's wife said he did get up less last night and his family member thought his steps were larger and higher yesterday after the LP.        Education: see above.       Assessment-  Patient demonstrates a better time to complete TUG but not a minimal detectable change. , not significantly changed Gait Speed, and increased time in FTSST performance but was more unsteady and used his hands to complete that test. . Remains at medium risk of falls. His steps were larger and higher to start with walking and generally larger today for a longer time but his steps do become smaller and he does shuffle with increased fatigue.  He he had only one episode of LOB today compared to 3 yesterday.  Pt does recommend he use the RW all the time with walking. PT does recommend he sit on the toilet or use a urinal at night to help prevent falling in the bathroom.        Plan-  Will discharge patient from Physical Therapy. Results will be  communicated with referring physician.     Goals Met? Partially. Please note that if goals are partially met this is related to performance level and short duration of PT intervention during current encounter for testing purposes. This may indicate the need for future PT intervention to reduce falls risk and improve independent mobility.     Treatment Time/Interventions:    Gait training 45 minutes   Neuro Re-ed 15 minutes   Therapy Activity 30 minutes     Peggy Terrazas, PT  4/17/2019

## 2021-05-27 NOTE — PROCEDURES
[facility] (formerly [facility]3] Radiology Post Procedure    Procedure: fluoro guided lumbar puncture    Radiologist: Virgil Goncalves    Fluoro Time: .4 minutes  Number of images: 1     EBL: minimal  Complications: none    Preliminary findings: (see dictation for full detail)  Opening pressure 18 cm H2O  30 ml clear CSF obtained from L4-5 level    Assess/Plan:   Samples to lab with orders as per requesting MD  Bedrest for 1 hr. May do PT/OT during this period of time.    Virgil Goncalves

## 2021-05-27 NOTE — PROGRESS NOTES
Physical Therapy Outpatient NPH Assessment/Treatment  Levy URBANO SUSIEJacquelin   4/16/2020   Diagnoses: Possible Normal Pressure Hydrocephalus (NPH), gait difficulty  Patient Active Problem List   Diagnosis     Chest Pain     Postherpetic Neuralgia     Sebaceous Cyst     Influenza     Vasovagal Syncope     Acute Myocardial Infarction     Paroxysmal Ventricular Tachycardia     Dizziness     Type 2 diabetes mellitus with diabetic neuropathy (H)     Hypercholesterolemia     Transient Ischemic Attack     Lumbar Radiculopathy     CAD (coronary artery disease)     ICD (implantable cardioverter-defibrillator), dual, in situ     HTN (hypertension)     Squamous cell carcinoma of left hand     Squamous cell carcinoma of skin of left upper arm     Squamous cell carcinoma of skin of right temple     Squamous cell carcinoma, face     Imbalance     Gait disturbance     Ectropion of both lower eyelids     Past Medical History:   Diagnosis Date     Acute myocardial infarction (H)      CAD (coronary artery disease)      Cancer (H)     skin     DM2 (diabetes mellitus, type 2) (H)      HLD (hyperlipidemia)      Hypertension      ICD (implantable cardioverter-defibrillator), dual, in situ      Neuropathy (H)      Stroke (H)      TIA (transient ischemic attack)        Subjective-  Subjective Information/Comments: Pt said he has a hx of a stroke about 5 years ago that did effect the right leg.  He said his walking is slower he walks indep 90% of the time.  Pt does use the walker but not all the time per the pt's wife.  She said he walks to far behind the walker and he seems to take smaller steps.  He is really unsteady on his feet after sitting for awhile.  Pt does have a hx of falls.         Has patient fallen in the last 6 months? No    Pt lives with his wife in a one level home and has no steps to get in.  Pt walks with and without the walker at home and mostly ambulates without assist. Pt is Oriented to 3 and follow commands.  Some increased  cues   Does patient receive assistance with the following at baseline:    Supine<>sit No [place]   Sit<>stand No Modified [place]   Gait Yes rolling walker, assist on occ.     Stairs  Pt has no steps.       Objective-    Pain: No    Left LE Assessment- 5/5  Right LE Assessment- 5/5, hip flex 4+/5.  Pt can feel light touch in the right LE    Transfer Status:   Sit>stand CGA to SBA    Stand>sit min assist of one and cues to get closer to the chair     Comments: Pt does best with using his UEs with sit <>stand.      Gait Speed:   Gait speed was .46  meters per second, indicating decreased speed and patient is at risk for falls.   Gait Speed Interpretation   >1.2 m/sec Independent in all activities and crossing street   0.8-1.0 m/sec Community ambulator, household activities. May need intervention to reduce falls risk.   0.6-0.8 m/sec Performs self-care, limited community ambulator. May need intervention to reduce falls risk.   <0.6 m/sec Dependent in ADLs, household ambulator. Needs intervention to reduce falls risk.    Minimal Detectable Change for preferred gait (PD) = 0.18 meters/sec   Pt did use a RW for the test.     Timed Up & Go (TUG) Balance Assessment: Average 21.03 sec   Trial 1 21.31 sec; Trial 2 22.22 sec; Trial 3 19.56 sec       Gait assistive device used: rolling walker. Pt did rest between some of the reps.     Time  Interpretation   <10 seconds Freely mobile   >13.5 seconds Indicates increased risk for falling in community dwelling older adults   >20 seconds Indicates impaired functional mobility; may need an assistive device   >30 seconds Indicates dependency in most ADL and mobility skills   The TUG is a test of basic mobility skills.  It is used to screen individuals prone to falls.    Minimal Detectable Change for patients with Alzheimzer s = 4.09 sec    Minimal Detectable Change for patients with Parkinson s Disease = 3.5 sec      Five Times Sit to Stand Test: (FTSST) The FTSTST measures  functional LE strength and provides information about postural control during transitions to/from standing.    Patient Score: 18.53 seconds  Did patient require use of hands to complete? Yes (If yes, the test is not valid; however, still gives objective measurement of function)      Pt tried the test without his hands and he did get up x 1 reps and tried another and had LOB back into the chair.  Pt did start over with the test and he did use his arms.        Interpretation of Results: > 16.0 seconds indicates risk of falls.    Average score (diagnosis of PD) =9.67 seconds (range = 5.9 - 13.5 sec)    Minimal Detectable Change = 2.5 sec    Minimally Clinical Important Difference = 2.3 sec    Additional Gait Training/Comments: Pt walked 10'x3, 100' x3 with the FWW.  Pt does use his UEs with sit<>stand.  He tends to sit down to far away from the chair. With increased fatigue, his steps become much smaller and he catches his toes and tends to lean forward when trying to sit down.   Pt used the RW for the TUG and speed tests.  His steps are larger with the walker  but his steps do become smaller and he does catch his bilat feet with swing phase, R>L.  Pt tends to walk to far behind the walker.  Pt ends to  the walker with turning. Pt did walk 300' without AD with CGA.  Pt's steps are generally smaller when walking without the walker.  He does have a WBOS and each foot does not pass the other foot with increased fatigue. Pt did catch the right toe x 2 and PT did assist to correct.  Pt does have a flexed posture and decreased right UE swing. .  Pt's transfers are unsafe.  PT does recommend he does use a RW with mobility.             Education: PT did spend sig time talking with the family and the pt on the POC,  Family wanted his gait tested 1/2 hour after his LP today because her bother is a PA and talked to another neurologist and said this should be done that way.  PT did tell the pt,  PT did tell them that PT  does have to follow the MD orders.            Assessment-  Pt is a 79 y.o. y.o. male referred to Physical Therapy for evaluation and treatment of Normal Pressure Hydrocephalus (NPH). Assessment findings will be compared to post-lumbar puncture data in order to assist with decision making regarding shunt placement. Assessment findings indicate that patient is at high risk of falls.       Physical Therapy Diagnosis: Gait instability  Impairments: Decreased balance, right LE neuropathy  Functional Limitations: Decreased functional mobility, gait, and balance resulting in increased risk for falls    Plan-  Treatment Plan will include: Therapeutic activity/Transfer training, Patient/caregiver education, Gait training, Neuromuscular re-education    Frequency/Duration: 2x/week, for 1 week; up to 2 visits    Goals:  -Patient will ambulate 200 with least restrictive assistive device independently, in order to ambulate household distance.   - Patient will demonstrate meaningful improvement in standardized tests in order to improve community mobility and reduce falls risk.   -Patient will demonstrate understanding of education regarding NPH and applicable exercises to improve balance and falls risk.       Treatment Time/Interventions:    Gait training 30 minutes   Neuro Re-ed 15 minutes   PT evaluation               PT conference 10 minutes  Peggy Terrazas, PT 4/16/2019     Physician Recommendation:  1. I certify the need for these services furnished within this plan and while under my care. I agree with the therapist's recommendation for plan of care.  2. If there is any recommendation for modification of therapy plan, please indicate below.    Physician's Signature (Printed):  _____________________________

## 2021-05-28 NOTE — TELEPHONE ENCOUNTER
Writer spoke with Aneta- she states patient has seen his neurologist within the past 30 days- advised she could try and have them order it OR come in here to be seen. She will call neuro to get in home PT order

## 2021-05-28 NOTE — TELEPHONE ENCOUNTER
Request for Orders    Who s Requesting: Home Care Physical Therapist    Orders being requested: Assessment + one visit this week, then 2w3.  MSW for eval    Where to send Orders: respond via [employer].

## 2021-05-28 NOTE — TELEPHONE ENCOUNTER
Who is calling:  Patient's daughterAneta  Reason for Call:  Calling in again as she has not yet received a call back regarding her request for referral (physical therapy) for patient.  Date of last appointment with primary care: 1/22/19  Okay to leave a detailed message: Yes

## 2021-05-28 NOTE — TELEPHONE ENCOUNTER
Patient's daughter Aneta called [facility] this morning regarding [facility].     Reviewed chart and the order that was placed is incorrect for home care. Please place the correct home care referral.     Thank you

## 2021-05-28 NOTE — TELEPHONE ENCOUNTER
Assessment-  Patient demonstrates a better time to complete TUG but not a minimal detectable change. , not significantly changed Gait Speed, and increased time in FTSST performance but was more unsteady and used his hands to complete that test. . Remains at medium risk of falls. His steps were larger and higher to start with walking and generally larger today for a longer time but his steps do become smaller and he does shuffle with increased fatigue.  He he had only one episode of LOB today compared to 3 yesterday.  Pt does recommend he use the RW all the time with walking. PT does recommend he sit on the toilet or use a urinal at night to help prevent falling in the bathroom.         Plan-  Will discharge patient from Physical Therapy. Results will be communicated with referring physician.      Goals Met? Partially. Please note that if goals are partially met this is related to performance level and short duration of PT intervention during current encounter for testing purposes. This may indicate the need for future PT intervention to reduce falls risk and improve independent mobility.      Treatment Time/Interventions:               Gait training 45 minutes              Neuro Re-ed 15 minutes              Therapy Activity 30 minutes      Peggy Terrazas, PT  4/17/2019

## 2021-05-28 NOTE — TELEPHONE ENCOUNTER
Referral Request  Type of referral: Physical therapy at home   Who s requesting: patient's daughter  Why the request: Patient was diagnosed with neuropathy and would like to strengthen his leg muscles to prevent more falls.   Have you been seen for this request: Yes  Does patient have a preference on a group/provider? Whomever  recommends   Okay to leave a detailed message?  Yes

## 2021-05-28 NOTE — TELEPHONE ENCOUNTER
Did you want patient to have home care-PT?       Ok for home care referral (provided he is homebound)

## 2021-05-28 NOTE — PROGRESS NOTES
Office Visit - Follow up    Levy Alba   79 y.o. male    Date of Visit: 5/13/2019    Chief Complaint   Patient presents with     Diabetes     Hypertension     Coronary Artery Disease     Peripheral Neuropathy     right thigh       Subjective: Diabetes mellitus type 2 with history of coronary artery disease and hypertension.    Patient asked about optimal dose of tamsulosin I suggested 0.4 mg daily.  Explained incomplete bladder emptying due to prostate hypertrophy suggest [facility] or [place] [facility] consult with Dr. Finn Singh at [phone].    Risk for falls this increase he does have a peripheral neuropathy especially involving the right lower extremity he has underlying diabetes.  Order for home physical therapy was written today as well is accompanied today by his wife Shandra and a daughter Aneta.    Impaired hearing the patient anticipates consultation at the [facility] and clinics May 22, 2019 there he will be doing hearing testing and may ultimately require hearing aids which she will be able to get through the [facility] and [facility].    No chest pain or shortness of breath no blood in stool or urine.  Medication list reviewed well-tolerated.  Patient has allergies listed to amiodarone lisinopril niacin and penicillin.  He is a non-smoker he does not abuse alcohol.    ROS: A comprehensive review of systems was performed and was otherwise negative    Medications:  Prior to Admission medications    Medication Sig Start Date End Date Taking? Authorizing Provider   aspirin 81 mg chewable tablet Chew 81 mg daily.   Yes Flakito Morton MD   atorvastatin (LIPITOR) 20 MG tablet TAKE 1 TABLET DAILY 2/18/19  Yes Regino Gan MD   cholecalciferol, vitamin D3, 1,000 unit tablet Take 1,000 Units by mouth daily.    Yes Flakito Morton MD   glimepiride (AMARYL) 4 MG tablet TAKE 1 TABLET (4 MG TOTAL) BY MOUTH 2 (TWO) TIMES  A DAY 4/24/18  Yes Flakito Morton MD   losartan (COZAAR) 25 MG tablet Take 25 mg by mouth daily.   Yes PROVIDER, HISTORICAL   metFORMIN (GLUCOPHAGE) 1000 MG tablet Take 1 tablet (1,000 mg total) by mouth 2 (two) times a day. 6/18/18  Yes Flakito Morton MD   metoprolol tartrate (LOPRESSOR) 50 MG tablet Take 1 tablet (50 mg total) by mouth at bedtime. 2/25/19  Yes Saurabh Blake MD   multivitamin therapeutic tablet Take 1 tablet by mouth daily.   Yes PROVIDER, HISTORICAL   omega-3 fatty acids-vitamin E (FISH OIL) 1,000 mg cap Take 1 capsule by mouth daily.   Yes PROVIDER, HISTORICAL   pyridoxine, vitamin B6, (VITAMIN B-6) 100 MG tablet Take 100 mg by mouth daily.   Yes PROVIDER, HISTORICAL   tamsulosin (FLOMAX) 0.4 mg cap Take 1 capsule (0.4 mg total) by mouth 2 (two) times a day.  Patient taking differently: Take 0.4 mg by mouth daily.        7/26/18  Yes Flakito Morton MD   blood glucose test (CONTOUR TEST STRIPS) strips TEST 2 TIMES DAILY AS DIRECTED(E119). 12/6/18   Flakito Morton MD       Allergies:   Allergies   Allergen Reactions     Amiodarone      neuropathy     Lisinopril Cough     Niacin Unknown     Penicillins Hives       Immunizations:   Immunization History   Administered Date(s) Administered     Influenza [id number], 08/15/2016     Influenza high dose, seasonal 08/31/2014, 09/11/2015, 08/30/2017     Influenza, inj, historic,unspecified 11/02/2007, 01/06/2010     Influenza, seasonal,quad inj 6-35 mos 09/04/2013     Pneumo Conj 13-V (2010&after) 05/27/2015     Pneumo Polysac 23-V 08/03/2005, 01/11/2013     Td,adult,historic,unspecified 1939     Tdap 09/05/2011, 06/16/2016     ZOSTER, LIVE 01/11/2013       Exam Chest clear to auscultation and percussion.  Heart tones regular rhythm without murmur rub or gallop.  Abdomen soft nontender no organomegaly.  No peritoneal signs.  Extremities free of edema cyanosis or clubbing.  Neck veins nondistended no thyromegaly or scleral  icterus noted, carotids full.  Skin warm and dry easily conversant good spirited.  Normal intelligence.  Neurologically intact no gross localizing findings.    134/64 pulse 64 respirations 18 O2 sats 98%.  Weight up 3 pounds from previous.  The patient denies any blood in stool or urine he has no trouble with current meds he is on he does have multiple drug allergies.    Assessment and Plan  Diabetes mellitus type 2 check A1c blood sugar lipid panel urine for microalbumin today.    Prostatism with enlarged prostate continue tamsulosin 0.4 mg daily advised limitation of caffeine intake especially after 7 PM suggest [place] [facility] consultation with Dr. Finn Singh or associate at [phone].    Hypertension controlled.  134/64.    Peripheral neuropathy with imbalance and frequent falls suggest continuation of same meds and cares physical therapy at home to be prescribed for leg muscle strengthening and gait training and core muscle strengthening.    Time: total time spent with the patient was 40 minutes of which >50% was spent in counseling and coordination of care    The following low BMI interventions were performed this visit: weight gain advised    Regino Gan MD    Patient Active Problem List   Diagnosis     Chest Pain     Postherpetic Neuralgia     Sebaceous Cyst     Influenza     Vasovagal Syncope     Acute Myocardial Infarction     Paroxysmal Ventricular Tachycardia     Dizziness     Type 2 diabetes mellitus with diabetic neuropathy (H)     Hypercholesterolemia     Transient Ischemic Attack     Lumbar Radiculopathy     CAD (coronary artery disease)     ICD (implantable cardioverter-defibrillator), dual, in situ     HTN (hypertension)     Squamous cell carcinoma of left hand     Squamous cell carcinoma of skin of left upper arm     Squamous cell carcinoma of skin of right temple     Squamous cell carcinoma, face     Imbalance     Gait disturbance     Ectropion of both lower eyelids

## 2021-05-28 NOTE — TELEPHONE ENCOUNTER
Patient is requesting Physical Therapy start of care on 5/2019.     New verbal order needed for SOC on 5/20/19

## 2021-05-29 NOTE — TELEPHONE ENCOUNTER
Refill Approved    Rx renewed per Medication Renewal Policy. Medication was last renewed on 4/24/18.    Haley Nichols, [facility] Triage/Med Refill 6/10/2019     Requested Prescriptions   Pending Prescriptions Disp Refills     glimepiride (AMARYL) 4 MG tablet [Pharmacy Med Name: GLIMEPIRIDE 4 MG TABLET] 180 tablet 2     Sig: TAKE 1 TABLET TWICE A DAY       Oral Hypoglycemics Refill Protocol Passed - 6/8/2019 10:23 AM        Passed - Visit with PCP or prescribing provider visit in last 6 months       Last office visit with prescriber/PCP: Visit date not found OR same dept: 5/13/2019 Regino Gan MD OR same specialty: 5/13/2019 Regino Gan MD Last physical: Visit date not found Last MTM visit: Visit date not found         Next appt within 3 mo: Visit date not found  Next physical within 3 mo: Visit date not found  Prescriber OR PCP: Flakito Morton MD  Last diagnosis associated with med order: 1. Diabetes (H)  - glimepiride (AMARYL) 4 MG tablet [Pharmacy Med Name: GLIMEPIRIDE 4 MG TABLET]; TAKE 1 TABLET TWICE A DAY  Dispense: 180 tablet; Refill: 2     If protocol passes may refill for 12 months if within 3 months of last provider visit (or a total of 15 months).           Passed - A1C in last 6 months     Hemoglobin A1c   Date Value Ref Range Status   05/13/2019 6.2 (H) 3.5 - 6.0 % Final               Passed - Microalbumin in last year      Microalbumin, Random Urine   Date Value Ref Range Status   05/13/2019 0.69 0.00 - 1.99 mg/dL Final                  Passed - Blood pressure in last year     BP Readings from Last 1 Encounters:   05/20/19 108/50             Passed - Serum creatinine in last year     Creatinine   Date Value Ref Range Status   12/11/2018 1.17 0.70 - 1.30 mg/dL Final

## 2021-05-31 VITALS — HEIGHT: 69 IN | BODY MASS INDEX: 25.92 KG/M2 | WEIGHT: 175 LBS

## 2021-05-31 VITALS — WEIGHT: 173 LBS | HEIGHT: 69 IN | BODY MASS INDEX: 25.62 KG/M2

## 2021-05-31 VITALS — WEIGHT: 173 LBS | BODY MASS INDEX: 25.62 KG/M2 | HEIGHT: 69 IN

## 2021-05-31 NOTE — TELEPHONE ENCOUNTER
Wife calling wondering how long Levy needs to be fasting for. RN advised around 10 hours prior to fasting lab work. Pt advised nothing to eat or drink after 1030pm tonight.   Wife and pt stated understanding.  Katy Harris, RN   [facility] RN Triage    Reason for Disposition    Health Information question, no triage required and triager able to answer question    Protocols used: INFORMATION ONLY CALL-A-AH

## 2021-05-31 NOTE — TELEPHONE ENCOUNTER
Medication Question or Clarification  Who is calling: Patient daughter   What medication are you calling about? (include dose and sig)   losartan (COZAAR) 25 MG tablet        Sig - Route: Take 25 mg by mouth daily. - Oral      Who prescribed the medication?: Provider Historical   What is your question/concern?: caller states the patient is not taking his medication as proscribed is above medication discontinued patient  is also on Metoprolol tartrate 50 mg . Please advise .  Pharmacy: [id number]  Okay to leave a detailed message?: No  Site CMT - Please call the pharmacy to obtain any additional needed information.

## 2021-05-31 NOTE — PROGRESS NOTES
Office Visit - Follow up    Levy Alba   79 y.o. male    Date of Visit: 8/19/2019    Chief Complaint   Patient presents with     Diabetes     Coronary Artery Disease     Hypertension     Memory Loss       Subjective: Diabetes mellitus type 2 with history of coronary artery disease and hypertension.  Memory concerns discussed okay for neurologic consultation.  Discussed the pros and cons of medications used for memory.  Including Aricept.    Last laboratory tests done from May 13, 2019 reviewed.    Accompanied today by his son.  The patient denies any blood in stool or urine no chest pain or shortness of breath medication list reconciled.  He is allergic to lisinopril which causes a cough and he is also listed as allergic to amiodarone.  Currently on losartan as an angiotensin receptor blocker and no need for urine for microalbumin in this regard        ROS: A comprehensive review of systems was performed and was otherwise negative    Medications:  Prior to Admission medications    Medication Sig Start Date End Date Taking? Authorizing Provider   aspirin 81 mg chewable tablet Chew 81 mg daily.   Yes Flakito Morton MD   atorvastatin (LIPITOR) 20 MG tablet TAKE 1 TABLET DAILY 2/18/19  Yes Regino Gan MD   cholecalciferol, vitamin D3, 1,000 unit tablet Take 1,000 Units by mouth daily.    Yes Flakito Morton MD   glimepiride (AMARYL) 4 MG tablet TAKE 1 TABLET TWICE A DAY 6/10/19  Yes Regino Gan MD   metFORMIN (GLUCOPHAGE) 1000 MG tablet Take 1 tablet (1,000 mg total) by mouth 2 (two) times a day. 6/18/18  Yes Flakito Morton MD   metoprolol tartrate (LOPRESSOR) 50 MG tablet TAKE 1 TABLET BY MOUTH EVERYDAY AT BEDTIME 7/22/19  Yes Saurabh Blake MD   multivitamin therapeutic tablet Take 1 tablet by mouth daily.   Yes PROVIDER, HISTORICAL   omega-3 fatty acids-vitamin E (FISH OIL) 1,000 mg cap Take 1 capsule by mouth daily.   Yes PROVIDER, HISTORICAL   pyridoxine, vitamin B6,  (VITAMIN B-6) 100 MG tablet Take 100 mg by mouth daily.   Yes PROVIDER, HISTORICAL   tamsulosin (FLOMAX) 0.4 mg cap Take 1 capsule (0.4 mg total) by mouth 2 (two) times a day.  Patient taking differently: Take 0.4 mg by mouth daily.        7/26/18  Yes Flakito Morton MD   blood glucose test (CONTOUR TEST STRIPS) strips TEST 2 TIMES DAILY AS DIRECTED(E119). 12/6/18   Flakito Morton MD   losartan (COZAAR) 25 MG tablet Take 25 mg by mouth daily.    PROVIDER, HISTORICAL   omega-3/dha/epa/fish oil (FISH OIL-OMEGA-3 FATTY ACIDS) 300-1,000 mg capsule Take 1 g by mouth daily. Indications: high amount of triglyceride in the blood  8/19/19  PROVIDER, HISTORICAL       Allergies:   Allergies   Allergen Reactions     Amiodarone      neuropathy     Lisinopril Cough     Niacin Unknown     Penicillins Hives       Immunizations:   Immunization History   Administered Date(s) Administered     Influenza [id number], 08/15/2016     Influenza high dose, seasonal 08/31/2014, 09/11/2015, 08/30/2017     Influenza, inj, historic,unspecified 11/02/2007, 01/06/2010     Influenza, seasonal,quad inj 6-35 mos 09/04/2013     Pneumo Conj 13-V (2010&after) 05/27/2015     Pneumo Polysac 23-V 08/03/2005, 01/11/2013     Td,adult,historic,unspecified 1939     Tdap 09/05/2011, 06/16/2016     ZOSTER, LIVE 01/11/2013       Exam Chest clear to auscultation and percussion.  Heart tones regular rhythm without murmur rub or gallop.  Abdomen soft nontender no organomegaly.  No peritoneal signs.  Extremities free of edema cyanosis or clubbing.  Neck veins nondistended no thyromegaly or scleral icterus noted, carotids full.  Skin warm and dry easily conversant good spirited.  Normal intelligence.  Neurologically intact no gross localizing findings.    120/64 pulse 64 respirations 18 O2 sats 98% weight down 1 pound.  BMI is ideal at 23.  There is some conjunctival irritation bilaterally symmetric without drainage.  Patient does appear mildly  confused    Assessment and Plan  Diabetes mellitus type 2 with hypertension and mild dementia memory concerns.  Check today A1c blood sugar lipid panel.    No need for urine for microalbumin patient is allergic to lisinopril with a cough plus patient is already on losartan low-dose RTC 3 months time.  History of coronary artery disease with history of implantable cardio defibrillator pacemaker.  No recent discharges from same.    Time: total time spent with the patient was 25 minutes of which >50% was spent in counseling and coordination of care    The following low BMI interventions were performed this visit: weight gain advised    Regino Gan MD    Patient Active Problem List   Diagnosis     Chest Pain     Postherpetic Neuralgia     Sebaceous Cyst     Influenza     Vasovagal Syncope     Acute Myocardial Infarction     Paroxysmal Ventricular Tachycardia     Dizziness     Type 2 diabetes mellitus with diabetic neuropathy (H)     Hypercholesterolemia     Transient Ischemic Attack     Lumbar Radiculopathy     CAD (coronary artery disease)     ICD (implantable cardioverter-defibrillator), dual, in situ     HTN (hypertension)     Squamous cell carcinoma of left hand     Squamous cell carcinoma of skin of left upper arm     Squamous cell carcinoma of skin of right temple     Squamous cell carcinoma, face     Imbalance     Gait disturbance     Ectropion of both lower eyelids

## 2021-05-31 NOTE — TELEPHONE ENCOUNTER
Current med list states patient should be taking metoprolol tartrate 50mg at bedtime and losartan 25mg tabs     Is this correct?

## 2021-06-01 VITALS — BODY MASS INDEX: 24.2 KG/M2 | WEIGHT: 169 LBS | HEIGHT: 70 IN

## 2021-06-01 VITALS — HEIGHT: 70 IN | WEIGHT: 172 LBS | BODY MASS INDEX: 24.62 KG/M2

## 2021-06-01 VITALS — BODY MASS INDEX: 24.34 KG/M2 | WEIGHT: 170 LBS | HEIGHT: 70 IN

## 2021-06-01 NOTE — TELEPHONE ENCOUNTER
I spoke with her- she states her mom is very adamant that patient be seen by Dr. Gan    I scheduled him for Dr. Adamson @ 1pm tomorrow 9/6 for now    Advised will have Josie check and see if patient can see PCP instead.     If not then he will still get his pre-op in time.     Josie- please call Aneta @ [phone] in the morning- early AM ok- ok to leave detailed message    She would like to know either way if patient can or can't be fit in with Luis.

## 2021-06-01 NOTE — PROGRESS NOTES
Office Visit - Follow up    Levy Alba   79 y.o. male    Date of Visit: 9/17/2019    Chief Complaint   Patient presents with     Follow-up     Hospital follow up       Subjective: Hypertension extended office visit after hospital follow-up.  Medicine list was reconciled and reviewed.    CT scans of the lumbar spine were reviewed from recent hospital stay for frequent falls.  Lumbar spinal stenosis found.    CT scan of the head was reviewed from recent hospitalization for frequent falls showing signs can suggestive of normal pressure hydrocephalus.  Patient refuses a shunt.    The patient does have symptoms of dementia plus urinary incontinence and imbalance problems see above frequent falls.    No blood in stool or urine he has been seen by Dr. MIRAMONTES from neurology there is no chest pain or shortness of breath.    Medication list reviewed well-tolerated normal effects.    Skin treatment with 5-fluorouracil for actinic keratosis and basal cell skin the patient is very fair complected ready.    The patient's daughter Katherine is present I offered lab testing including urinalysis hemogram conference of metabolic profile but they declined.    ROS: A comprehensive review of systems was performed and was otherwise negative    Medications:  Prior to Admission medications    Medication Sig Start Date End Date Taking? Authorizing Provider   acetaminophen (TYLENOL) 500 MG tablet Take 1 tablet (500 mg total) by mouth every 6 (six) hours as needed. 9/15/19  Yes Flaquito Mayer MD   ascorbic acid, vitamin C, (VITAMIN C) 500 MG tablet Take 500 mg by mouth daily with lunch.   Yes PROVIDER, HISTORICAL   aspirin 81 mg chewable tablet Chew 81 mg daily with lunch.          Yes Flakito Morton MD   atorvastatin (LIPITOR) 20 MG tablet Take 20 mg by mouth every evening.          Yes Regino Gan MD   cholecalciferol, vitamin D3, 1,000 unit tablet Take 1,000 Units by mouth daily with lunch.          Yes Selene  Flakito AGUIRRE MD   cyanocobalamin 500 MCG tablet Take 500 mcg by mouth daily with lunch.   Yes PROVIDER, HISTORICAL   fluorouracil (EFUDEX) 5 % cream Apply 1 application topically 2 (two) times a day. Apply to entire face.   Yes PROVIDER, HISTORICAL   glimepiride (AMARYL) 4 MG tablet TAKE 1 TABLET TWICE A DAY 6/10/19  Yes Regino Gan MD   losartan (COZAAR) 25 MG tablet Take 25 mg by mouth daily.   Yes PROVIDER, HISTORICAL   metFORMIN (GLUCOPHAGE) 1000 MG tablet TAKE 1 TABLET BY MOUTH TWICE A DAY 9/16/19  Yes Flakito Morton MD   metoprolol tartrate (LOPRESSOR) 50 MG tablet TAKE 1 TABLET BY MOUTH EVERYDAY AT BEDTIME 7/22/19  Yes Saurabh Blake MD   multivitamin therapeutic tablet Take 1 tablet by mouth daily with lunch.          Yes PROVIDER, HISTORICAL   omega-3 fatty acids-vitamin E (FISH OIL) 1,000 mg cap Take 1 capsule by mouth daily with lunch.          Yes PROVIDER, HISTORICAL   omeprazole (PRILOSEC) 20 MG capsule Take 1 capsule (20 mg total) by mouth 2 (two) times a day. 9/15/19  Yes Flaquito Mayer MD   pyridoxine, vitamin B6, (VITAMIN B-6) 100 MG tablet Take 100 mg by mouth daily with lunch.          Yes PROVIDER, HISTORICAL   tamsulosin (FLOMAX) 0.4 mg cap TAKE 1 CAPSULE (0.4 MG TOTAL) BY MOUTH 2 (TWO) TIMES A DAY. 8/27/19  Yes Virgil Allan MD       Allergies:   Allergies   Allergen Reactions     Adhesive Tape-Silicones Other (See Comments)     Skin tears with removal of bandages     Amiodarone      neuropathy     Codeine Nausea And Vomiting     Lisinopril Cough     Niacin Unknown     Penicillins Hives     Vicodin [Hydrocodone-Acetaminophen]        Immunizations:   Immunization History   Administered Date(s) Administered     Influenza [id number], 08/15/2016     Influenza high dose,seasonal,PF, 65+ yrs 08/31/2014, 09/11/2015, 08/30/2017     Influenza, inj, historic,unspecified 11/02/2007, 01/06/2010     Influenza, seasonal,quad inj 6-35 mos 09/04/2013     Pneumo Conj 13-V (2010&after)  05/27/2015     Pneumo Polysac 23-V 08/03/2005, 01/11/2013     Td,adult,historic,unspecified 1939     Tdap 09/05/2011, 06/16/2016     ZOSTER, LIVE 01/11/2013       Exam Chest clear to auscultation and percussion.  Heart tones regular rhythm without murmur rub or gallop.  Abdomen soft nontender no organomegaly.  No peritoneal signs.  Extremities free of edema cyanosis or clubbing.  Neck veins nondistended no thyromegaly or scleral icterus noted, carotids full.  Skin warm and dry easily conversant good spirited.  Normal intelligence.  Neurologically intact no gross localizing findings.    120/58 pulse 70 respirations 18 O2 sats 99%.    Assessment and Plan  Hypertension controlled.  120/58.    5-FU treatment for skin actinic keratoses and basal cell skin cancers with diffuse erythema noted scalp face and resected area vertex of scalp just the left of midline large about 3 cm in circular diameter.  Healing well not secondarily infected.    Possible normal pressure hydrocephalus will defer to neurology.  Patient has problems with balance urinary incontinence and dementia.    Frequent falls.    Diabetes mellitus type 2 offered additional laboratory testing again today patient and daughter Nisa here refused.  Multiple questions posed by patient but especially the patient's daughter Nisa all are well intention and trying to help the patient and attempts were made to try to answer them.    Status post permanent pacemaker defibrillator ischemic cardiomyopathy with history of myocardial infarction and underlying coronary artery disease.    Time: total time spent with the patient was 40 minutes of which >50% was spent in counseling and coordination of care    The following high BMI interventions were performed this visit: encouragement to exercise    Regino Gan MD    Patient Active Problem List   Diagnosis     Chest Pain     Postherpetic Neuralgia     Sebaceous Cyst     Influenza     Vasovagal Syncope      Acute Myocardial Infarction     Paroxysmal Ventricular Tachycardia     Dizziness     Type 2 diabetes mellitus with diabetic neuropathy (H)     Hypercholesterolemia     Transient Ischemic Attack     Lumbar radiculopathy     Atherosclerosis of native coronary artery of native heart without angina pectoris     ICD (implantable cardioverter-defibrillator), dual, in situ     Primary hypertension     Squamous cell carcinoma of left hand     Squamous cell carcinoma of skin of left upper arm     Squamous cell carcinoma of skin of right temple     Squamous cell carcinoma, face     Imbalance     Gait disturbance     Ectropion of both lower eyelids     Near syncope     Melanoma of scalp (H)     Dehydration     Dyslipidemia     Lightheadedness     Lactic acidosis     Cerebrovascular disease     Recurrent falls     Unsteadiness     Nausea and vomiting, intractability of vomiting not specified, unspecified vomiting type     Gastroesophageal reflux disease, esophagitis presence not specified     Spinal stenosis of lumbar region, unspecified whether neurogenic claudication present

## 2021-06-01 NOTE — TELEPHONE ENCOUNTER
Who is calling:  Daughter, and patient's wife  Reason for Call:  Patient was admitted yesterday through the ED, was hoping to see PCP this morning during rounds.  Daughter was informed that Hospitalist now rounds on in-patients.  Family would appreciate a call from Dr Gan to talk directly with primary.  Date of last appointment with primary care: 9/6/19  Okay to leave a detailed message: Yes

## 2021-06-01 NOTE — TELEPHONE ENCOUNTER
Refill Approved    Rx renewed per Medication Renewal Policy. Medication was last renewed on 6/18/18.    Haley Nichols, [facility] Triage/Med Refill 9/16/2019     Requested Prescriptions   Pending Prescriptions Disp Refills     metFORMIN (GLUCOPHAGE) 1000 MG tablet [Pharmacy Med Name: METFORMIN HCL 1,000 MG TABLET] 180 tablet 3     Sig: TAKE 1 TABLET BY MOUTH TWICE A DAY       Metformin Refill Protocol Passed - 9/16/2019  6:09 PM        Passed - Blood pressure in last 12 months     BP Readings from Last 1 Encounters:   09/15/19 115/58             Passed - LFT or AST or ALT in last 12 months     Albumin   Date Value Ref Range Status   09/12/2019 3.6 3.5 - 5.0 g/dL Final     Bilirubin, Total   Date Value Ref Range Status   09/12/2019 0.6 0.0 - 1.0 mg/dL Final     Bilirubin, Direct   Date Value Ref Range Status   10/03/2013 0.2 <0.6 mg/dL Final     Alkaline Phosphatase   Date Value Ref Range Status   09/12/2019 73 45 - 120 U/L Final     AST   Date Value Ref Range Status   09/12/2019 34 0 - 40 U/L Final     ALT   Date Value Ref Range Status   09/12/2019 18 0 - 45 U/L Final     Protein, Total   Date Value Ref Range Status   09/12/2019 6.5 6.0 - 8.0 g/dL Final                Passed - GFR or Serum Creatinine in last 6 months     GFR MDRD Non Af Amer   Date Value Ref Range Status   09/13/2019 >60 >60 mL/min/1.73m2 Final     GFR MDRD Af Amer   Date Value Ref Range Status   09/13/2019 >60 >60 mL/min/1.73m2 Final             Passed - Visit with PCP or prescribing provider visit in last 6 months or next 3 months     Last office visit with prescriber/PCP: Visit date not found OR same dept: 8/19/2019 Regino Gan MD OR same specialty: 8/19/2019 Regino Gan MD Last physical: Visit date not found Last MTM visit: Visit date not found         Next appt within 3 mo: Visit date not found  Next physical within 3 mo: Visit date not found  Prescriber OR PCP: Flakito Morton MD  Last diagnosis associated with med  order: There are no diagnoses linked to this encounter.   If protocol passes may refill for 12 months if within 3 months of last provider visit (or a total of 15 months).           Passed - A1C in last 6 months     Hemoglobin A1c   Date Value Ref Range Status   08/19/2019 7.3 (H) 3.5 - 6.0 % Final               Passed - Microalbumin in last year      Microalbumin, Random Urine   Date Value Ref Range Status   05/13/2019 0.69 0.00 - 1.99 mg/dL Final

## 2021-06-01 NOTE — TELEPHONE ENCOUNTER
Who is calling:  Aneta  Reason for Call:  Please call and advise of message below regarding Pre-op appt.   Date of last appointment with primary care:  8/16/19   Has the patient been recently seen:  Yes  Okay to leave a detailed message: Yes

## 2021-06-01 NOTE — TELEPHONE ENCOUNTER
Medication Question or Clarification  Who is calling: Pharmacy: [facility]  What medication are you calling about? (include dose and sig)   omeprazole (PRILOSEC) 20 MG capsule 60 capsule 0 9/15/2019     Sig - Route: Take 1 capsule (20 mg total) by mouth 2 (two) times a day. - Oral    Who prescribed the medication?: Flaquito Mayer MD  What is your question/concern?: Patient was prescribed omeprazole 20 mg twice daily by  . He would like you to handle this prescription ,Insurance will only cover once daily or 40 mg strength . Please advise .  [facility]: [id number]  Okay to leave a detailed message?: No  Site CMT - Please call the pharmacy to obtain any additional needed information.

## 2021-06-01 NOTE — TELEPHONE ENCOUNTER
Dr. Gan,    Are you able to take over prescribing this RX? Patient would need a 40 mg capsule sent to pharmacy for insurance to cover.    Please advise.    Priscilla CORTEZ LPN .......... 9:23 AM  09/20/19

## 2021-06-01 NOTE — PROGRESS NOTES
Scheduled Follow Up Call: Attempt 1 Community Health Worker called and left a message for the patient. If the patient is returning my call, please transfer the patient to Ketty at ext. [phone].

## 2021-06-01 NOTE — PROGRESS NOTES
TCM DISCHARGE FOLLOW UP CALL    Discharge Date:  9/15/2019  Reason for hospital stay (discharge diagnosis)::  Recurrent falls, unsteadiness, spinal stenosis  Are you feeling better, the same or worse since your discharge?:  Patient is feeling better (Denies pain, N/V. Pt kept referring to wife for answers during call.)  Do you feel like you have a plan in the event of a health emergency?: Yes (Dtr, Wife)    As part of your discharge plan, were  home care services ordered for you?: Yes    Have you seen them yet, or are they scheduled to visit?: Yes    Do you have any follow up visits scheduled with your PCP or Specialist?:  Yes, with PCP  (RN) Is PCP appt scheduled soon enough (within 14 days of discharge date)?: Yes (9/17)    RN NOTES::  Pt hasn't purchased omeprazole yet from Pharmacy. Instructed pt to get it today and start it today. Pt agreed.  Pt doesn't know if he has neuro appt. Dtr handles appts.

## 2021-06-01 NOTE — TELEPHONE ENCOUNTER
Dr. Gan,    Are you able to contact the daughter/wife as requested below on patient being admitted to hospital?    Thank you.    Priscilla CORTEZ LPN .......... 10:04 AM  09/13/19

## 2021-06-01 NOTE — TELEPHONE ENCOUNTER
RN Triage:     Son calling in no consent to communicate in the chart.   Night sweats episode last night. NO known fever. Patient had recent surgery on his head, no wound infection and healing nicely. No chest pain, difficulty breathing, patient is incontinent and always has urgency and frequency with urination per son. Blood sugars fine per son.   Patient is doing better today, stronger and more independent. Son will call the patient to check and see if how he is doing. Son will call back with an update.   Amanda Cazares RN, BSN [facility] Triage Nurse    Reason for Disposition    [1] NIGHT SWEATS occur (e.g., drenching sweat that occurs at night and has to change bed clothes or bed sheets) AND [2] cause unknown    Protocols used: [id number]

## 2021-06-01 NOTE — TELEPHONE ENCOUNTER
Requesting a VO for delay in admission for home care services until 9/18/19 so family may be at visit. Please let me know if you approve. Thank you.

## 2021-06-01 NOTE — TELEPHONE ENCOUNTER
New Appointment Needed  What is the reason for the visit:    Pre-Op Appt Request  When is the surgery? :  Tuesday 09/10    Where is the surgery?:   [facility]    Who is the surgeon? :  Dr. Beck    What type of surgery is being done?: Melanoma removal    Provider Preference: Any available     How soon do you need to be seen?: Tomorrow    Waitlist offered?: No    Okay to leave a detailed message:  Yes

## 2021-06-01 NOTE — TELEPHONE ENCOUNTER
Physical Therapy [facility] is requesting verbal ok for 2W2 to work on strengthening, gait/transfer training, and balance training. You can respond to this inbasket with the ok or call and leave a message at [phone]. Thank you     hemodynamic monitoring/emergency venous access respiratory failure

## 2021-06-01 NOTE — PROGRESS NOTES
Office Visit - Physical    Levy Alba   79 y.o. male    Date of Visit: 9/6/2019    Chief Complaint   Patient presents with     Pre-op Exam     Excision of melanoma with skin graft Dr. SERGIO Guerrero on 9/10/2019 at [facility]  fax [phone]       Subjective: Preoperative examination in anticipation of excision of melanoma vertex of scalp with skin grafts on September 10, 2019 at [facility].    Fax number there [phone].    79-year-old male with history of multiple sunburns and blistering he has a fair complexion.  Melanoma found on the vertex of his scalp.    Multiple drug allergies including amiodarone lisinopril niacin and penicillin.  He is a non-smoker he does not abuse alcohol.  Patient's past medical history is extensive see below.    ROS: A comprehensive review of systems was performed and was otherwise negative    Medications:   Prior to Admission medications    Medication Sig Start Date End Date Taking? Authorizing Provider   atorvastatin (LIPITOR) 20 MG tablet TAKE 1 TABLET DAILY 2/18/19  Yes Regino Gan MD   cholecalciferol, vitamin D3, 1,000 unit tablet Take 1,000 Units by mouth daily.    Yes Flakito Morton MD   glimepiride (AMARYL) 4 MG tablet TAKE 1 TABLET TWICE A DAY 6/10/19  Yes Regino Gan MD   losartan (COZAAR) 25 MG tablet Take 25 mg by mouth daily.   Yes PROVIDER, HISTORICAL   metFORMIN (GLUCOPHAGE) 1000 MG tablet Take 1 tablet (1,000 mg total) by mouth 2 (two) times a day. 6/18/18  Yes Flakito Morton MD   metoprolol tartrate (LOPRESSOR) 50 MG tablet TAKE 1 TABLET BY MOUTH EVERYDAY AT BEDTIME 7/22/19  Yes Saurabh Blake MD   multivitamin therapeutic tablet Take 1 tablet by mouth daily.   Yes PROVIDER, HISTORICAL   omega-3 fatty acids-vitamin E (FISH OIL) 1,000 mg cap Take 1 capsule by mouth daily.   Yes PROVIDER, HISTORICAL   pyridoxine, vitamin B6, (VITAMIN B-6) 100 MG tablet Take 100 mg by mouth daily.   Yes PROVIDER,  HISTORICAL   tamsulosin (FLOMAX) 0.4 mg cap TAKE 1 CAPSULE (0.4 MG TOTAL) BY MOUTH 2 (TWO) TIMES A DAY. 8/27/19  Yes Virgil Allan MD   aspirin 81 mg chewable tablet Chew 81 mg daily.    Flakito Morton MD   blood glucose test (CONTOUR TEST STRIPS) strips TEST 2 TIMES DAILY AS DIRECTED(E119). 12/6/18   Flakito Morton MD       Allergies:  Allergies   Allergen Reactions     Amiodarone      neuropathy     Lisinopril Cough     Niacin Unknown     Penicillins Hives       Immunizations:   Immunization History   Administered Date(s) Administered     Influenza [id number], 08/15/2016     Influenza high dose,seasonal,PF, 65+ yrs 08/31/2014, 09/11/2015, 08/30/2017     Influenza, inj, historic,unspecified 11/02/2007, 01/06/2010     Influenza, seasonal,quad inj 6-35 mos 09/04/2013     Pneumo Conj 13-V (2010&after) 05/27/2015     Pneumo Polysac 23-V 08/03/2005, 01/11/2013     Td,adult,historic,unspecified 1939     Tdap 09/05/2011, 06/16/2016     ZOSTER, LIVE 01/11/2013       Health Maintenance: Immunizations are reviewed and up-to-date.  Accompanied today by his daughter.    Past Medical History: Prior history of ectropion diagnosis bilateral with surgery plan.    Neuropathy [place].    Problems with balance and no recent falls or loss of consciousness.  Previous treatment with physical therapy for balance disorder.    Non-smoker.    No alcohol.    Allergies penicillin plus amiodarone and lisinopril and niacin.  Prior history of coronary artery disease with 7 prior myocardial infarctions.    History of coronary angioplasty with stent deployment multiple.  In the remote past coronary bypass grafting in 1996.    Cardiomyopathy as a result of ischemia and status post ICD defibrillator with pacemaker.    History of ventricular tachycardia.    Ischemic cardiomyopathy by history with ejection fractions ranging between 35 and 40% on echocardiogram done November 2017.  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  History of blepharoplasty both eyes planned.  Multiple prior coronary stents deployed.        Past Surgical History: See above.  History of coronary bypass grafting and coronary stents have been deployed.  These are multiple.    Family History: Patient is adopted.    Adoptive parents health history includes that his adopted mother  at age 58.    Father  age 59.    The patient is positive for children his wife is still living.  They have 5 grandchildren.  His wife is well and one daughter accompanies him here today.    Social History: Multiple severe sunburns for this fair complected individual in his youth.    Retired now from a factory position at .    Exam Chest clear to auscultation and percussion.  Heart tones regular rhythm without murmur rub or gallop.  Abdomen soft nontender no organomegaly.  No peritoneal signs.  Extremities free of edema cyanosis or clubbing.  Neck veins nondistended no thyromegaly or scleral icterus noted, carotids full.  Skin warm and dry easily conversant good spirited.  Normal intelligence.  Neurologically intact no gross localizing findings.  On the vertex of his scalp there is a lesion that is consistent with a melanoma.  Malignant.  Biopsy positive.    BMI 24 blood pressure 110/56 pulse 80 respirations 18 O2 sats 98% easily conversing good spirited.  There is bilateral ectropion noted lower lids.    Assessment and Plan  Medically acceptable risk for anticipated surgery with an grafting at [facility] September 10, 2019.  Fax number [facility] [phone].  Preoperatively check hemogram plus conference of metabolic profile today.    Total time spent with the patient today was 40 minutes of which greater than 50% was spent in counseling and coordination of care.    Regino Gan MD    Patient Active Problem List   Diagnosis     Chest Pain     Postherpetic Neuralgia     Sebaceous Cyst     Influenza     Vasovagal  Syncope     Acute Myocardial Infarction     Paroxysmal Ventricular Tachycardia     Dizziness     Type 2 diabetes mellitus with diabetic neuropathy (H)     Hypercholesterolemia     Transient Ischemic Attack     Lumbar Radiculopathy     CAD (coronary artery disease)     ICD (implantable cardioverter-defibrillator), dual, in situ     HTN (hypertension)     Squamous cell carcinoma of left hand     Squamous cell carcinoma of skin of left upper arm     Squamous cell carcinoma of skin of right temple     Squamous cell carcinoma, face     Imbalance     Gait disturbance     Ectropion of both lower eyelids

## 2021-06-02 VITALS — HEIGHT: 70 IN | WEIGHT: 162 LBS | BODY MASS INDEX: 23.19 KG/M2

## 2021-06-02 VITALS — WEIGHT: 159 LBS | HEIGHT: 70 IN | BODY MASS INDEX: 22.76 KG/M2

## 2021-06-02 VITALS — BODY MASS INDEX: 22.76 KG/M2 | HEIGHT: 70 IN | WEIGHT: 159 LBS

## 2021-06-02 VITALS — HEIGHT: 70 IN | BODY MASS INDEX: 22.62 KG/M2 | WEIGHT: 158 LBS

## 2021-06-02 VITALS — HEIGHT: 70 IN | BODY MASS INDEX: 23.19 KG/M2 | WEIGHT: 162 LBS

## 2021-06-02 NOTE — TELEPHONE ENCOUNTER
PT visit cancelled by patient on 10/11/19 due to too many other appointments and requesting PT visit week of 10/13/19. Requesting OK to move PT visit to week of 10/13/19. Thank you.   You may respond to this in basket or call me at [phone]

## 2021-06-02 NOTE — TELEPHONE ENCOUNTER
RN cannot approve Refill Request    RN can NOT refill this medication historical medication requested. Last office visit: 9/17/2019 Regino Gan MD Last Physical: 9/6/2019 Last MTM visit: Visit date not found Last visit same specialty: 9/17/2019 Regino Gan MD.  Next visit within 3 mo: Visit date not found  Next physical within 3 mo: Visit date not found      Benson Galvan, [facility] Connection Triage/Med Refill 10/27/2019    Requested Prescriptions   Pending Prescriptions Disp Refills     atorvastatin (LIPITOR) 20 MG tablet [Pharmacy Med Name: ATORVASTATIN 20 MG TABLET] 90 tablet 2     Sig: TAKE 1 TABLET BY MOUTH EVERY DAY       Statins Refill Protocol (Hmg CoA Reductase Inhibitors) Passed - 10/27/2019  8:51 AM        Passed - PCP or prescribing provider visit in past 12 months      Last office visit with prescriber/PCP: 9/17/2019 Regino Gan MD OR same dept: 9/17/2019 Regino Gan MD OR same specialty: 9/17/2019 Regino Gan MD  Last physical: 9/6/2019 Last MTM visit: Visit date not found   Next visit within 3 mo: Visit date not found  Next physical within 3 mo: Visit date not found  Prescriber OR PCP: Regino Gan MD  Last diagnosis associated with med order: 1. Hyperlipemia  - atorvastatin (LIPITOR) 20 MG tablet [Pharmacy Med Name: ATORVASTATIN 20 MG TABLET]; TAKE 1 TABLET BY MOUTH EVERY DAY  Dispense: 90 tablet; Refill: 2    If protocol passes may refill for 12 months if within 3 months of last provider visit (or a total of 15 months).

## 2021-06-02 NOTE — PROGRESS NOTES
Community Health Worker called and left a message for the patient.  If the patient is returning my call, please transfer the patient to Ketty at ext. [phone].   Patient has been mailed a unreachable letter and was provided with CHW contact information if they are interested in accessing Clinic Care Coordination.  Order for Care Management has been closed, no further outreach will be done at this time and patient can be re-referred.

## 2021-06-03 ENCOUNTER — RECORDS - HEALTHEAST (OUTPATIENT)
Dept: ADMINISTRATIVE | Facility: CLINIC | Age: 81
End: 2021-06-03

## 2021-06-03 VITALS — HEIGHT: 70 IN | WEIGHT: 161 LBS | BODY MASS INDEX: 23.05 KG/M2

## 2021-06-03 VITALS
BODY MASS INDEX: 23.7 KG/M2 | DIASTOLIC BLOOD PRESSURE: 56 MMHG | WEIGHT: 160 LBS | SYSTOLIC BLOOD PRESSURE: 110 MMHG | OXYGEN SATURATION: 98 % | HEIGHT: 69 IN | HEART RATE: 80 BPM

## 2021-06-03 VITALS — WEIGHT: 160 LBS | HEIGHT: 70 IN | BODY MASS INDEX: 22.9 KG/M2

## 2021-06-03 NOTE — TELEPHONE ENCOUNTER
Medication Request  Medication name: Losartan, no dose was indicated  Pharmacy Name and Location: [id number]  Reason for request: Patient last picked up this medication 1/2018.  Is patient suppose to be taking it?  When did you use medication last?:  1/2018  Patient offered appointment:  n/a  Okay to leave a detailed message: no

## 2021-06-03 NOTE — TELEPHONE ENCOUNTER
Dr. Gan,  Please review message below and advise if the patient is still suppose to be taking losartan.  If so, please advise on a dose, sig, and quantity of refill requested.  Thank you.  Emma URBANO, ANGELA/CMT....................10:57 AM

## 2021-06-03 NOTE — TELEPHONE ENCOUNTER
For now please asked patient to stay off the losartan.    At the time of his next office visit please remind me to reassess the need for continuation or restarting losartan.  For now remain off of it.  Please call patient for me; and/or his family

## 2021-06-03 NOTE — TELEPHONE ENCOUNTER
Who is calling:  Patient's daughter, Aneta  Reason for Call:  She is also calling in regards to the below message. The patient is currently off this medication as he does not have any at home. Caller states patient has had increased falls over the last 2 years and, if he was taken off this medication in February of 2018 due to concerns of hypotension causing his falls, she would like to know if he should just stay off the medication. Please update pharmacy and daughter with Dr Gan's recommendations.    Date of last appointment with primary care: 9/17/2019  Okay to leave a detailed message: Yes

## 2021-06-04 VITALS
OXYGEN SATURATION: 99 % | DIASTOLIC BLOOD PRESSURE: 50 MMHG | WEIGHT: 157 LBS | WEIGHT: 157 LBS | HEART RATE: 62 BPM | BODY MASS INDEX: 23.79 KG/M2 | SYSTOLIC BLOOD PRESSURE: 112 MMHG | SYSTOLIC BLOOD PRESSURE: 110 MMHG | HEIGHT: 68 IN | BODY MASS INDEX: 23.87 KG/M2 | DIASTOLIC BLOOD PRESSURE: 58 MMHG | HEART RATE: 65 BPM | RESPIRATION RATE: 14 BRPM

## 2021-06-04 VITALS
BODY MASS INDEX: 23.64 KG/M2 | OXYGEN SATURATION: 99 % | DIASTOLIC BLOOD PRESSURE: 58 MMHG | SYSTOLIC BLOOD PRESSURE: 120 MMHG | HEIGHT: 68 IN | WEIGHT: 156 LBS | HEART RATE: 60 BPM

## 2021-06-04 VITALS
HEIGHT: 68 IN | DIASTOLIC BLOOD PRESSURE: 60 MMHG | HEART RATE: 69 BPM | SYSTOLIC BLOOD PRESSURE: 110 MMHG | BODY MASS INDEX: 23.79 KG/M2 | WEIGHT: 157 LBS | OXYGEN SATURATION: 98 %

## 2021-06-04 NOTE — PROGRESS NOTES
In clinic device check by [employer] Rep and follow-up with Dr. Blake.  Please see link for full device report.  Patient was informed of results and next follow up during today's visit.

## 2021-06-04 NOTE — PROGRESS NOTES
Office Visit - Follow up    Levy Alba   79 y.o. male    Date of Visit: 12/9/2019    Chief Complaint   Patient presents with     Hypertension     Diabetes       Subjective: Diabetes mellitus type 2 with history of hypertension.    Blood pressure today excellent 120/58 with losartan off for the last 3 weeks.  Medication concerns addressed.    Seeing urology the patient is not considered to be a surgical candidate the patient was started on finasteride 5 mg daily by urology.    No blood in stool or urine no chest pain shortness of breath.    Medication list reviewed well-tolerated.  The patient feels better and less dizzy off losartan.  Metoprolol plus tamsulosin will help keep blood pressure down.  To date 120/58.    Multiple drug allergies and sensitivities noted including adhesive tape amiodarone codeine lisinopril niacin penicillin and hydrocodone.    ROS: A comprehensive review of systems was performed and was otherwise negative    Medications:  Prior to Admission medications    Medication Sig Start Date End Date Taking? Authorizing Provider   ascorbic acid, vitamin C, (VITAMIN C) 500 MG tablet Take 500 mg by mouth daily with lunch.   Yes PROVIDER, HISTORICAL   aspirin 81 mg chewable tablet Chew 81 mg daily with lunch.          Yes Flakito Morton MD   atorvastatin (LIPITOR) 20 MG tablet TAKE 1 TABLET BY MOUTH EVERY DAY 10/28/19  Yes Regino Gan MD   cholecalciferol, vitamin D3, 1,000 unit tablet Take 1,000 Units by mouth daily with lunch.          Yes Flakito Morton MD   cyanocobalamin 500 MCG tablet Take 500 mcg by mouth daily with lunch.   Yes PROVIDER, HISTORICAL   glimepiride (AMARYL) 4 MG tablet TAKE 1 TABLET TWICE A DAY 6/10/19  Yes Regino Gan MD   metFORMIN (GLUCOPHAGE) 1000 MG tablet TAKE 1 TABLET BY MOUTH TWICE A DAY 9/16/19  Yes Flakito Morton MD   metoprolol tartrate (LOPRESSOR) 50 MG tablet TAKE 1 TABLET BY MOUTH EVERYDAY AT BEDTIME 10/29/19  Yes Hayden  Saurabh THOMAS MD   multivitamin therapeutic tablet Take 1 tablet by mouth daily with lunch.          Yes PROVIDER, HISTORICAL   omega-3 fatty acids-vitamin E (FISH OIL) 1,000 mg cap Take 1 capsule by mouth daily with lunch.          Yes PROVIDER, HISTORICAL   omeprazole (PRILOSEC) 20 MG capsule Take 20 mg by mouth daily before breakfast.   Yes PROVIDER, HISTORICAL   pyridoxine, vitamin B6, (VITAMIN B-6) 100 MG tablet Take 100 mg by mouth daily with lunch.          Yes PROVIDER, HISTORICAL   tamsulosin (FLOMAX) 0.4 mg cap TAKE 1 CAPSULE (0.4 MG TOTAL) BY MOUTH 2 (TWO) TIMES A DAY. 8/27/19  Yes Virgil Allan MD   acetaminophen (TYLENOL) 500 MG tablet Take 1 tablet (500 mg total) by mouth every 6 (six) hours as needed. 9/15/19   Flaquito Mayer MD   finasteride (PROSCAR) 5 mg tablet Take 5 mg by mouth daily. 10/15/19   PROVIDER, HISTORICAL   losartan (COZAAR) 25 MG tablet Take 25 mg by mouth daily.    PROVIDER, HISTORICAL   atorvastatin (LIPITOR) 20 MG tablet Take 20 mg by mouth every evening.         12/9/19  Regino Gan MD   fluorouracil (EFUDEX) 5 % cream Apply 1 application topically 2 (two) times a day. Apply to entire face.  12/9/19  PROVIDER, HISTORICAL   omeprazole (PRILOSEC) 40 MG capsule Take 1 capsule (40 mg total) by mouth daily. 9/23/19 12/9/19  Regino Gan MD       Allergies:   Allergies   Allergen Reactions     Adhesive Tape-Silicones Other (See Comments)     Skin tears with removal of bandages     Amiodarone      neuropathy     Codeine Nausea And Vomiting     Lisinopril Cough     Niacin Unknown     Penicillins Hives     Vicodin [Hydrocodone-Acetaminophen]        Immunizations:   Immunization History   Administered Date(s) Administered     Influenza [id number], 08/15/2016     Influenza high dose,seasonal,PF, 65+ yrs 08/31/2014, 09/11/2015, 08/30/2017     Influenza, inj, historic,unspecified 11/02/2007, 01/06/2010     Influenza, seasonal,quad inj 6-35 mos 09/04/2013     Pneumo Conj  13-V (2010&after) 05/27/2015     Pneumo Polysac 23-V 08/03/2005, 01/11/2013     Td,adult,historic,unspecified 1939     Tdap 09/05/2011, 06/16/2016     ZOSTER, LIVE 01/11/2013       Exam Chest clear to auscultation and percussion.  Heart tones regular rhythm without murmur rub or gallop.  Abdomen soft nontender no organomegaly.  No peritoneal signs.  Extremities free of edema cyanosis or clubbing.  Neck veins nondistended no thyromegaly or scleral icterus noted, carotids full.  Skin warm and dry easily conversant good spirited.  Normal intelligence.  Neurologically intact no gross localizing findings.    120/58 pulse 60 respirations 18 O2 sats 99%.  BMI is ideal at 24 weight down 2 pounds from previous, accompanied today by 2 of his children Jona Lockett.    Assessment and Plan  Diabetes mellitus type 2 controlled check A1c plus blood sugar lipid panel today stable.    Multiple drug allergies addressed.    BPH continue tamsulosin plus finasteride.  Thought not to be a surgical candidate.    Hypertension controlled.  120/58 stable no recent target organ damage related to same.    History of squamous cell skin cancer no clinical evidence of recurrence.  R TC 3 months time fasting.    Time: total time spent with the patient was 25 minutes of which >50% was spent in counseling and coordination of care    The following low BMI interventions were performed this visit: weight gain advised    Regino Gan MD    Patient Active Problem List   Diagnosis     Chest Pain     Postherpetic Neuralgia     Sebaceous Cyst     Influenza     Vasovagal Syncope     Acute Myocardial Infarction     Paroxysmal Ventricular Tachycardia     Dizziness     Type 2 diabetes mellitus with diabetic neuropathy (H)     Hypercholesterolemia     Transient Ischemic Attack     Lumbar radiculopathy     Atherosclerosis of native coronary artery of native heart without angina pectoris     ICD (implantable cardioverter-defibrillator), dual, in situ      Primary hypertension     Squamous cell carcinoma of left hand     Squamous cell carcinoma of skin of left upper arm     Squamous cell carcinoma of skin of right temple     Squamous cell carcinoma, face     Imbalance     Gait disturbance     Ectropion of both lower eyelids     Near syncope     Melanoma of scalp (H)     Dehydration     Dyslipidemia     Lightheadedness     Lactic acidosis     Cerebrovascular disease     Recurrent falls     Unsteadiness     Nausea and vomiting, intractability of vomiting not specified, unspecified vomiting type     Gastroesophageal reflux disease, esophagitis presence not specified     Spinal stenosis of lumbar region, unspecified whether neurogenic claudication present

## 2021-06-04 NOTE — TELEPHONE ENCOUNTER
Patient Returning Call  Reason for call:  Return call  Information relayed to patient:  n/a  Patient has additional questions:  Yes  If YES, what are your questions/concerns:  Caller stated that 1 pm does not work. Patient stated anytime Monday if possible. Caller would like a call back.  Okay to leave a detailed message?: Yes   [phone]

## 2021-06-04 NOTE — TELEPHONE ENCOUNTER
Refill Approved    Rx renewed per Medication Renewal Policy. Medication was last renewed on 6/10/19.    Haley Nichols, [facility] Triage/Med Refill 12/15/2019     Requested Prescriptions   Pending Prescriptions Disp Refills     glimepiride (AMARYL) 4 MG tablet [Pharmacy Med Name: GLIMEPIRIDE 4 MG TABLET] 180 tablet 1     Sig: TAKE 1 TABLET BY MOUTH TWICE A DAY       Oral Hypoglycemics Refill Protocol Passed - 12/13/2019  3:57 AM        Passed - Visit with PCP or prescribing provider visit in last 6 months       Last office visit with prescriber/PCP: 12/9/2019 OR same dept: 12/9/2019 Regino Gan MD OR same specialty: 12/9/2019 Regino Gan MD Last physical: 9/6/2019 Last MTM visit: Visit date not found         Next appt within 3 mo: Visit date not found  Next physical within 3 mo: Visit date not found  Prescriber OR PCP: Regino Gan MD  Last diagnosis associated with med order: 1. Diabetes (H)  - glimepiride (AMARYL) 4 MG tablet [Pharmacy Med Name: GLIMEPIRIDE 4 MG TABLET]; TAKE 1 TABLET BY MOUTH TWICE A DAY  Dispense: 180 tablet; Refill: 1     If protocol passes may refill for 12 months if within 3 months of last provider visit (or a total of 15 months).           Passed - A1C in last 6 months     Hemoglobin A1c   Date Value Ref Range Status   12/09/2019 6.4 (H) 3.5 - 6.0 % Final               Passed - Microalbumin in last year      Microalbumin, Random Urine   Date Value Ref Range Status   05/13/2019 0.69 0.00 - 1.99 mg/dL Final                  Passed - Blood pressure in last year     BP Readings from Last 1 Encounters:   12/09/19 120/58             Passed - Serum creatinine in last year     Creatinine   Date Value Ref Range Status   09/13/2019 1.03 0.70 - 1.30 mg/dL Final

## 2021-06-04 NOTE — PROGRESS NOTES
Office Visit - Follow up    Levy Alba   79 y.o. male    Date of Visit: 12/23/2019    Chief Complaint   Patient presents with     Ear Plugged     X1-2 weeks - no ear pain       Subjective: Diabetes mellitus type 2.    Impaired hearing right ear.  Left side clear.    Right ear is full of debris earwax and dry skin.  It looks to be old is darkened.  Debrox was advised 2 drops each night to the right ear for 5-7 nights for earwax loosening and softening before ear lavage with next office visit in 5 to 10 days.    Multiple drug allergies noted including adhesive tape amiodarone codeine lisinopril niacin penicillin and hydrocodone for Vicodin.    The patient denies polyuria or polydipsia.  The patient's latest blood sugars reviewed he is accompanied by his son he is wheelchair-bound.    No blood in stool or urine no chest pain or shortness of breath on the vertex of his scalp previous skin cancer was resected and is healing well and not secondarily infected.    Non-smoker no excess alcohol.  Medication list reviewed reconciled..    ROS: A comprehensive review of systems was performed and was otherwise negative    Medications:  Prior to Admission medications    Medication Sig Start Date End Date Taking? Authorizing Provider   acetaminophen (TYLENOL) 500 MG tablet Take 1 tablet (500 mg total) by mouth every 6 (six) hours as needed. 9/15/19  Yes Flaquito Mayer MD   ascorbic acid, vitamin C, (VITAMIN C) 500 MG tablet Take 500 mg by mouth daily with lunch.   Yes PROVIDER, HISTORICAL   aspirin 81 mg chewable tablet Chew 81 mg daily with lunch.          Yes Flakito Morton MD   atorvastatin (LIPITOR) 20 MG tablet TAKE 1 TABLET BY MOUTH EVERY DAY 10/28/19  Yes Regino Gan MD   cholecalciferol, vitamin D3, 1,000 unit tablet Take 1,000 Units by mouth daily with lunch.          Yes Flakito Morton MD   cyanocobalamin 500 MCG tablet Take 500 mcg by mouth daily with lunch.   Yes PROVIDER, HISTORICAL    finasteride (PROSCAR) 5 mg tablet Take 5 mg by mouth daily. 10/15/19  Yes PROVIDER, HISTORICAL   glimepiride (AMARYL) 4 MG tablet TAKE 1 TABLET BY MOUTH TWICE A DAY 12/15/19  Yes Regino Gan MD   losartan (COZAAR) 25 MG tablet Take 25 mg by mouth daily.   Yes PROVIDER, HISTORICAL   metFORMIN (GLUCOPHAGE) 1000 MG tablet TAKE 1 TABLET BY MOUTH TWICE A DAY 9/16/19  Yes Flakito Morton MD   metoprolol tartrate (LOPRESSOR) 50 MG tablet TAKE 1 TABLET BY MOUTH EVERYDAY AT BEDTIME 10/29/19  Yes Saurabh Blake MD   multivitamin therapeutic tablet Take 1 tablet by mouth daily with lunch.          Yes PROVIDER, HISTORICAL   omega-3 fatty acids-vitamin E (FISH OIL) 1,000 mg cap Take 1 capsule by mouth daily with lunch.          Yes PROVIDER, HISTORICAL   omeprazole (PRILOSEC) 20 MG capsule Take 20 mg by mouth daily before breakfast.   Yes PROVIDER, HISTORICAL   pyridoxine, vitamin B6, (VITAMIN B-6) 100 MG tablet Take 100 mg by mouth daily with lunch.          Yes PROVIDER, HISTORICAL   tamsulosin (FLOMAX) 0.4 mg cap TAKE 1 CAPSULE (0.4 MG TOTAL) BY MOUTH 2 (TWO) TIMES A DAY. 8/27/19  Yes Virgil Allan MD       Allergies:   Allergies   Allergen Reactions     Adhesive Tape-Silicones Other (See Comments)     Skin tears with removal of bandages     Amiodarone      neuropathy     Codeine Nausea And Vomiting     Lisinopril Cough     Niacin Unknown     Penicillins Hives     Vicodin [Hydrocodone-Acetaminophen]        Immunizations:   Immunization History   Administered Date(s) Administered     Influenza A4x3-38, 08/15/2016     Influenza high dose,seasonal,PF, 65+ yrs 08/31/2014, 09/11/2015, 08/30/2017     Influenza, inj, historic,unspecified 11/02/2007, 01/06/2010     Influenza, seasonal,quad inj 6-35 mos 09/04/2013     Pneumo Conj 13-V (2010&after) 05/27/2015     Pneumo Polysac 23-V 08/03/2005, 01/11/2013     Td,adult,historic,unspecified 1939     Tdap 09/05/2011, 06/16/2016     ZOSTER, LIVE 01/11/2013        Exam Chest clear to auscultation and percussion.  Heart tones regular rhythm without murmur rub or gallop.  Abdomen soft nontender no organomegaly.  No peritoneal signs.  Extremities free of edema cyanosis or clubbing.  Neck veins nondistended no thyromegaly or scleral icterus noted, carotids full.  Skin warm and dry easily conversant good spirited.  Normal intelligence.  Neurologically intact no gross localizing findings.  Recently saw cardiologist this morning pacemaker battery to be changed in 12 months.    The heart rhythm was regular at the rate is normal at 72 respirations 18 O2 sats not listed.  The patient's blood pressure was good today.  112/58.  On the vertex of his scalp the melanoma previously resected is healing well no secondary infection.  The right ear canal and drum was full and obstructed Debrox advised.  Then ear lavage subsequently.  5 to 10 days time.  Office.  Not in acute distress wheelchair-bound accompanied by his son who is present very supportive.    Assessment and Plan  Diabetes mellitus type 2 stable.    Multiple drug allergies.    Impaired hearing right ear full of debris and ceruminosis that appears old Debrox advised 2 drops each night to the right ear for 5-10 nights then ear lavage.    Left ear canal and drum normal.    Melanoma skin cancer resected vertex of scalp incisional site is well-healed not secondarily infected..    History of imbalance and old strokes wheelchair-bound.  Stable.  No recent falls.    Time: total time spent with the patient was 15 minutes of which >50% was spent in counseling and coordination of care    The following low BMI interventions were performed this visit: weight gain advised    Regino Gan MD    Patient Active Problem List   Diagnosis     Chest Pain     Postherpetic Neuralgia     Sebaceous Cyst     Influenza     Vasovagal Syncope     Acute Myocardial Infarction     Paroxysmal Ventricular Tachycardia     Dizziness     Type 2 diabetes  mellitus with diabetic neuropathy (H)     Hypercholesterolemia     Transient Ischemic Attack     Lumbar radiculopathy     Atherosclerosis of native coronary artery of native heart without angina pectoris     ICD (implantable cardioverter-defibrillator), dual, in situ     Primary hypertension     Squamous cell carcinoma of left hand     Squamous cell carcinoma of skin of left upper arm     Squamous cell carcinoma of skin of right temple     Squamous cell carcinoma, face     Imbalance     Gait disturbance     Ectropion of both lower eyelids     Near syncope     Melanoma of scalp (H)     Dehydration     Dyslipidemia     Lightheadedness     Lactic acidosis     Cerebrovascular disease     Recurrent falls     Unsteadiness     Nausea and vomiting, intractability of vomiting not specified, unspecified vomiting type     Gastroesophageal reflux disease, esophagitis presence not specified     Spinal stenosis of lumbar region, unspecified whether neurogenic claudication present

## 2021-06-04 NOTE — PATIENT INSTRUCTIONS - HE
Levy Alba    Thank you for coming to the [facility] today for a cardiac evaluation  It was my pleasure to see you today  A good contact for any questions would be Cecilia Egan  RN @ [phone]    The pacemaker defibrillator evaluation today is excellent; expect battery to last about another year  The remote monitoring will assess battery voltage and when we reach LILO, battery depletion indicator, we will schedule for a ICD generator replacement  You had an episode of rapid ventricular tachycardia August 11 there was successfully paced terminated otherwise your heart rhythm has been quite good  On December 9, 2019 your cholesterol was excellent to 113; LDL 42  We reviewed medicines but made no adjustments  Return in 1 year for comprehensive cardiac arrhythmia device assessment

## 2021-06-04 NOTE — PROGRESS NOTES
Office Visit - Follow up    Levy Alba   79 y.o. male    Date of Visit: 12/30/2019    Chief Complaint   Patient presents with     Cerumen Impaction       Subjective: Hypertension.    110/60.    Impaired hearing right ear with deformity.  Debrox has been added by the patient 2 drops each night for the preceding 5 nights.    Here for ear lavage.  Impaired hearing right side ear lavage done and no postprocedure complications patient is wheelchair-bound accompanied by his son.    Multiple drug allergies are listed in the chart and reviewed including adhesive tape amiodarone codeine lisinopril niacin penicillin and Vicodin for hydrocodone.    Non-smoker no excess alcohol.  Medication list reviewed reconciled.    Denies chest pain shortness of breath no blood in stool or urine.    Vertex of scalp skin cancer resected and healing well no secondary infection.    ROS: A comprehensive review of systems was performed and was otherwise negative    Medications:  Prior to Admission medications    Medication Sig Start Date End Date Taking? Authorizing Provider   acetaminophen (TYLENOL) 500 MG tablet Take 1 tablet (500 mg total) by mouth every 6 (six) hours as needed. 9/15/19  Yes Flaquito Mayer MD   ascorbic acid, vitamin C, (VITAMIN C) 500 MG tablet Take 500 mg by mouth daily with lunch.   Yes PROVIDER, HISTORICAL   aspirin 81 mg chewable tablet Chew 81 mg daily with lunch.          Yes Flakito Morton MD   atorvastatin (LIPITOR) 20 MG tablet TAKE 1 TABLET BY MOUTH EVERY DAY 10/28/19  Yes Regino Gan MD   cholecalciferol, vitamin D3, 1,000 unit tablet Take 1,000 Units by mouth daily with lunch.          Yes Flakito Morton MD   cyanocobalamin 500 MCG tablet Take 500 mcg by mouth daily with lunch.   Yes PROVIDER, HISTORICAL   finasteride (PROSCAR) 5 mg tablet Take 5 mg by mouth daily. 10/15/19  Yes PROVIDER, HISTORICAL   glimepiride (AMARYL) 4 MG tablet TAKE 1 TABLET BY MOUTH TWICE A DAY 12/15/19   Yes Regino Gan MD   losartan (COZAAR) 25 MG tablet Take 25 mg by mouth daily.   Yes PROVIDER, HISTORICAL   metFORMIN (GLUCOPHAGE) 1000 MG tablet TAKE 1 TABLET BY MOUTH TWICE A DAY 9/16/19  Yes Flakito Morton MD   metoprolol tartrate (LOPRESSOR) 50 MG tablet TAKE 1 TABLET BY MOUTH EVERYDAY AT BEDTIME 10/29/19  Yes Saurabh Blake MD   multivitamin therapeutic tablet Take 1 tablet by mouth daily with lunch.          Yes PROVIDER, HISTORICAL   omega-3 fatty acids-vitamin E (FISH OIL) 1,000 mg cap Take 1 capsule by mouth daily with lunch.          Yes PROVIDER, HISTORICAL   omeprazole (PRILOSEC) 20 MG capsule Take 20 mg by mouth daily before breakfast.   Yes PROVIDER, HISTORICAL   pyridoxine, vitamin B6, (VITAMIN B-6) 100 MG tablet Take 100 mg by mouth daily with lunch.          Yes PROVIDER, HISTORICAL   tamsulosin (FLOMAX) 0.4 mg cap TAKE 1 CAPSULE (0.4 MG TOTAL) BY MOUTH 2 (TWO) TIMES A DAY. 8/27/19  Yes Virgil Allan MD       Allergies:   Allergies   Allergen Reactions     Adhesive Tape-Silicones Other (See Comments)     Skin tears with removal of bandages     Amiodarone      neuropathy     Codeine Nausea And Vomiting     Lisinopril Cough     Niacin Unknown     Penicillins Hives     Vicodin [Hydrocodone-Acetaminophen]        Immunizations:   Immunization History   Administered Date(s) Administered     Influenza [id number], 08/15/2016     Influenza high dose,seasonal,PF, 65+ yrs 08/31/2014, 09/11/2015, 08/30/2017     Influenza, inj, historic,unspecified 11/02/2007, 01/06/2010     Influenza, seasonal,quad inj 6-35 mos 09/04/2013     Pneumo Conj 13-V (2010&after) 05/27/2015     Pneumo Polysac 23-V 08/03/2005, 01/11/2013     Td,adult,historic,unspecified 1939     Tdap 09/05/2011, 06/16/2016     ZOSTER, LIVE 01/11/2013       Exam Chest clear to auscultation and percussion.  Heart tones regular rhythm without murmur rub or gallop.  Abdomen soft nontender no organomegaly.  No peritoneal signs.   Extremities free of edema cyanosis or clubbing.  Neck veins nondistended no thyromegaly or scleral icterus noted, carotids full.  Skin warm and dry easily conversant good spirited.  Normal intelligence.  Neurologically intact no gross localizing findings.    Ear lavage done right ear successful with no postprocedure complications good results.    110/60 pulse 69 respirations 18 O2 sats 98% BMI 24 weight stable at 157 pounds the patient's impaired hearing was relieved by ear lavage right side.    Assessment and Plan  Hypertension controlled.  110/60 stable.    Impaired hearing and ceruminosis impacted right ear status post successful ear lavage today x1 treatment after preceding Debrox applications 2 drops each night for the preceding 5 nights by patient and family.    Multiple drug allergies noted see above reviewed.    History of skin cancer vertex of scalp healing well without secondary infection after surgical treatment.  Dermatology.    Time: total time spent with the patient was 25 minutes of which >50% was spent in counseling and coordination of care        Regino Gan MD    Patient Active Problem List   Diagnosis     Chest Pain     Postherpetic Neuralgia     Sebaceous Cyst     Influenza     Vasovagal Syncope     Acute Myocardial Infarction     Paroxysmal Ventricular Tachycardia     Dizziness     Type 2 diabetes mellitus with diabetic neuropathy (H)     Hypercholesterolemia     Transient Ischemic Attack     Lumbar radiculopathy     Atherosclerosis of native coronary artery of native heart without angina pectoris     ICD (implantable cardioverter-defibrillator), dual, in situ     Primary hypertension     Squamous cell carcinoma of left hand     Squamous cell carcinoma of skin of left upper arm     Squamous cell carcinoma of skin of right temple     Squamous cell carcinoma, face     Imbalance     Gait disturbance     Ectropion of both lower eyelids     Near syncope     Melanoma of scalp (H)      Dehydration     Dyslipidemia     Lightheadedness     Lactic acidosis     Cerebrovascular disease     Recurrent falls     Unsteadiness     Nausea and vomiting, intractability of vomiting not specified, unspecified vomiting type     Gastroesophageal reflux disease, esophagitis presence not specified     Spinal stenosis of lumbar region, unspecified whether neurogenic claudication present

## 2021-06-04 NOTE — TELEPHONE ENCOUNTER
New Appointment Needed  What is the reason for the visit:    Patient requesting appt with only Dr. Gan for Right ear clogged, cannot hear possible wax buildup   Provider Preference: PCP only  How soon do you need to be seen?: asap   Waitlist offered?: No  Okay to leave a detailed message:  Yes

## 2021-06-06 NOTE — TELEPHONE ENCOUNTER
Symptom  Describe your symptoms: Patient has had two episodes today of a large amount of loose ,water like stool today.  Catheter is draining as normal for urine output .  Temp normal  No abdomen pain. Please call patient back not daughter.   Please advise.   Any pain: no  New/Ongoing: New  How long have you been having symptoms: 1  day(s)  Have you been seen for this:  No  Appointment offered?: No  Triage offered?: Yes, requesting call back from Nurse Advisor  Home remedies tried: None offered  Requested Pharmacy: [facility], [place]   Okay to leave a detailed message? No [phone]

## 2021-06-06 NOTE — TELEPHONE ENCOUNTER
Called and spoke to Aneta- she states that her brother brought over some 20mg OTC caps for patient- they now do not want a covering to fill    [facility] left on [facility] vm to disregard the refill sent in by Dr. Rollins and wait for 3/16 to receive the refill from Dr. Gan

## 2021-06-06 NOTE — TELEPHONE ENCOUNTER
Refill Approved    Rx renewed per Medication Renewal Policy. Medication was last renewed on 12/9/19.    Lillian Suarez, [facility] Triage/Med Refill 2/27/2020     Requested Prescriptions   Pending Prescriptions Disp Refills     omeprazole (PRILOSEC) 40 MG capsule [Pharmacy Med Name: OMEPRAZOLE DR 40 MG CAPSULE] 90 capsule 2     Sig: TAKE 1 CAPSULE BY MOUTH EVERY DAY       GI Medications Refill Protocol Passed - 2/27/2020  1:25 AM        Passed - PCP or prescribing provider visit in last 12 or next 3 months.     Last office visit with prescriber/PCP: 12/30/2019 Regino Gan MD OR same dept: 12/30/2019 Regino Gan MD OR same specialty: 12/30/2019 Regino Gan MD  Last physical: 9/6/2019 Last MTM visit: Visit date not found   Next visit within 3 mo: Visit date not found  Next physical within 3 mo: Visit date not found  Prescriber OR PCP: Regino Gan MD  Last diagnosis associated with med order: 1. Routine general medical examination at a health care facility  - omeprazole (PRILOSEC) 40 MG capsule [Pharmacy Med Name: OMEPRAZOLE DR 40 MG CAPSULE]; TAKE 1 CAPSULE BY MOUTH EVERY DAY  Dispense: 90 capsule; Refill: 2    If protocol passes may refill for 12 months if within 3 months of last provider visit (or a total of 15 months).

## 2021-06-06 NOTE — TELEPHONE ENCOUNTER
CC:  Diarrhea x 3 (1 yesterday - 2 today)      No fever   Good oral intake     Nothing black / bloody  No belly pain    Rest of triage negative         A/P:   > OK for home care as noted   > Discussed when need to be seen in clinic - worsening / new sx       roshan cheung rn         Reason for Disposition    MILD-MODERATE diarrhea (e.g., 1-6 times / day more than normal)    Protocols used: DIARRHEA-A-OH

## 2021-06-06 NOTE — TELEPHONE ENCOUNTER
Medication Request  Medication name: omeprazole 20 mg  [facility]ed [facility]  Reason for request: Patient's daughter, Aneta, stated the patient and Regino Gan MD talked about having the patient switch from 40 mg to 20 mg but the pharmacy keeps filling the 40 mg dose. Caller would like a new Rx for the omeprazole 20 mg.  When did you use medication last?:  Patient has been out, 2 days ago  Patient offered appointment:  n/a  Okay to leave a detailed message: yes [phone]

## 2021-06-06 NOTE — TELEPHONE ENCOUNTER
MTM referral from: Transition of Care    MTM referral outreach attempt #1 on Tuesday, 03/03/2020 at 11:14 AM      Outcome: Spoke with patient's daughter Aneta regarding MTM referral - Aneta declined services on patient's behalf and stated, she manages patient's medications. She understands and knows patient's medications well. They do not need any help at this time. Will route message to [facility] Pharmacist/Provider as a FYI.    Thank you,  See [facility] Pharmacy Coordinator

## 2021-06-06 NOTE — TELEPHONE ENCOUNTER
Request for Orders    Who s Requesting: Home Care Physical Therapist    Orders being requested: 1w1, 2w2 with emphasis on gait, balance, strengthening.    Where to send Orders: Please respond via Epic.

## 2021-06-07 NOTE — PROGRESS NOTES
"Levy Alba is a 80 y.o. male who is being evaluated via a billable telephone visit.      The patient has been notified of following:     \"This telephone visit will be conducted via a call between you and your physician/provider. We have found that certain health care needs can be provided without the need for a physical exam.  This service lets us provide the care you need with a short phone conversation.  If a prescription is necessary we can send it directly to your pharmacy.  If lab work is needed we can place an order for that and you can then stop by our lab to have the test done at a later time.    If during the course of the call the physician/provider feels a telephone visit is not appropriate, you will not be charged for this service.\"     Physician has received verbal consent for a Telephone Visit from the patient? Yes    Levy Alba complains of    Chief Complaint   Patient presents with     Hypoglycemia     seen in ER  3/23/20     Fatigue     Saturday  then blood sugars flucuating       I have reviewed and updated the patient's Past Medical History, Social History, Family History and Medication List.    ALLERGIES  Adhesive tape-silicones; Amiodarone; Codeine; Lisinopril; Niacin; Penicillins; and Vicodin [hydrocodone-acetaminophen]    Additional provider notes: It is mellitus type II.    Spell of hypoglycemia with blood sugar in the 20 range.    Previously on Amaryl plus dementia plus metformin.    Now blood sugars are rising after emergency room visit and Amaryl and metformin were stopped.  The patient was advised to continue to hold Amaryl as in this age group with dementia it is more likely that this oral sulfonylurea and oral hypoglycemic cause hypoglycemia than metformin.  Okay to restart metformin 1000 mg twice daily and to check blood sugars at a minimum of twice daily morning and before bed.  The patient feels well now according to his daughter whom he spoke with he is eating 3 square " meals a day and staying well-hydrated.  There is some increased activity in his head does have underlying dementia.    No blood in stool or urine or sputum.    Medication list reviewed well-tolerated normal effects reconciled.    Patient does not smoke or abuse alcohol.    Assessment/Plan:  Diabetes mellitus type 2 with hyperglycemia.  Blood sugars as low as 20 with emergency room visit.  Now 1 7300.  Okay to restart metformin 1000 mg twice daily.  Okay to resume Accu-Cheks or blood sugar checks at home twice daily morning and before bed.  Hold Amaryl or glimepiride.  Call in 1 week's time regarding status.    Multiple drug allergies and sensitivities including    Dementia with generalized atherosclerotic cardiovascular disease and multisystem problems discussed.  Meds and cares discussed in detail reconciled.  We had a good discussion.      Phone call duration:  11 minutes    Belkys Paul CMA

## 2021-06-07 NOTE — PROGRESS NOTES
"Levy Alba is a 80 y.o. male who is being evaluated via a billable telephone visit.      The patient has been notified of following:     \"This telephone visit will be conducted via a call between you and your physician/provider. We have found that certain health care needs can be provided without the need for a physical exam.  This service lets us provide the care you need with a short phone conversation.  If a prescription is necessary we can send it directly to your pharmacy.  If lab work is needed we can place an order for that and you can then stop by our lab to have the test done at a later time.    Telephone visits are billed at different rates depending on your insurance coverage. During this emergency period, for some insurers they may be billed the same as an in-person visit.  Please reach out to your insurance provider with any questions.    If during the course of the call the physician/provider feels a telephone visit is not appropriate, you will not be charged for this service.\"    Patient has given verbal consent to a Telephone visit? Yes    Patient would like to receive their AVS by AVS Preference: Ulises.    Additional provider notes: UTI.    Ciprofloxacin 500 mg twice daily started last evening.  Only catheter in place but he has had some problems with rectal incontinence last evening.    The patient is off Amaryl and the patient's blood sugars have been quite good on metformin although metformin does cause GI upset and the patient has had rectal incontinence as noted by his daughter Aneta who is on this call today.    Sinus and chest congestion may be benefited by Benadryl or diphenhydramine over-the-counter 25 mg at bedtime.  To try to dry up the sinuses and prevent nocturnal cough.    Diabetes mellitus type 2 the patient does have diabetes mellitus type 2 and is planning to see Dr. Torres the third week of May 2020  for endocrine evaluation.  It may be that the metformin is causing " intestinal upset and GI irritation and loose stools or incontinence of stool or perhaps the patient has diabetic autonomic neuropathy discussed in detail with the patient and his family.    Assessment/Plan:    Urinary tract infection questionable maintained course of ciprofloxacin 500 mg twice daily for a 10-day.    Diabetes mellitus type 2 with blood sugars acceptable endocrine consult coming third week of May 2020.    Rectal incontinence may be a side effect from metformin therapy and or manifestation of diabetic autonomic neuropathy.  Suggest Metamucil 1 heaping tablespoon daily may need further evaluation with neurology and or gastroenterology as it could be a manifestation of prior CVA.    Akins catheter in place draining clear generally but for a few drops of blood questionable UTI on ciprofloxacin.    Multiple drug allergies or noted.    Home health care services ordered and call back in 4 to 6 weeks time after endocrine consult with Dr. Torres.  Which I encouraged.        Phone call duration:  21 minutes    Belkys Paul [facility]

## 2021-06-07 NOTE — TELEPHONE ENCOUNTER
Who is calling:  Patient's daughterAneta  Reason for Call:  Caller stated she is upset that the clinic called the patient and his wife. Caller stated she had asked for the clinic to call her back but the clinic called the patient and his wife. Caller stated she would like Regino Gan MD to call her back because the patient's wife neglected to tell Regino Gan MD about the other symptoms the patient is having.    Caller stated the patient's urine was dark yellow, his mind is foggy, he has zero strength, always wants sleep, can't get his head up or hisself up, couldn't eat and feed himself, doesn't have temperature, blood sugars are good, and bowels were loosed yesterday but they are good today.    Caller stated these are the same symptoms the patient had before starting on an antibiotic. Caller stated these symptoms started again after 7 days of being off the antibiotic. Caller stated the patient has a catheter and feels the patient will be on it permanently. Caller stated the catheter has been changed twice in the last 2 months, since having it placed.     Caller stated she wanted to let Regino Gan MD know that the patient has been taking Fiber Choice for 2.5 weeks and Regino Gan MD has recommended Metamucil before.    Caller stated she would like to be called back about these symptoms and wants to know if a urine test can be ordered as the home care nurse is there now and can collect the sample.  Date of last appointment with primary care: Today  Okay to leave a detailed message: Yes [phone]

## 2021-06-07 NOTE — TELEPHONE ENCOUNTER
Requested orders can be placed to be sign via Epic.     However I think medicare requires a face to face office or video visit in order to approve for standard manual wheelchair and mechanical lift chair .    Please advise, thank you.

## 2021-06-07 NOTE — TELEPHONE ENCOUNTER
Okay for C. difficile toxin testing stools must be loose or watery for lab to accept.  I will send an order for same.    In the meantime Metamucil plus Imodium plus probiotics may be beneficial.

## 2021-06-07 NOTE — TELEPHONE ENCOUNTER
Please provide me with the exact verbiage I am to used to addend the last visit and I will.  Thanks.

## 2021-06-07 NOTE — TELEPHONE ENCOUNTER
FYI - Status Update  Who is Calling: Aneta, daughter  Update: Caller stated that she is waiting for a call back today.  Okay to leave a detailed message?:  Yes

## 2021-06-07 NOTE — TELEPHONE ENCOUNTER
Pt's daughter is calling in about her dad who had a clinic telephone visit yesterday and is still having loose stools. Daughter is wondering if he should also be tested for C-diff.    Please call Aneta at [phone].    Provider please advise.    Joel Sotomayor RN [facility] Triage/Medication Refill

## 2021-06-07 NOTE — TELEPHONE ENCOUNTER
Request for Orders    Who s Requesting: Home Care Occupational Therapist    Orders being requested: OT to continue 2w3, 1 PRN for ADLs/IADLs and cognition    Where to send Orders: Respond through Bernard Campos OTR/L

## 2021-06-07 NOTE — TELEPHONE ENCOUNTER
Happy to do a virtual video visit.  You are probably right that Medicare does require this for the durable medical good requested.  Please arrange.

## 2021-06-07 NOTE — TELEPHONE ENCOUNTER
Requesting okay to obtain UA/UC via rodriguez catheter today. Patient has had a decline in strength and cognition per family recently. Urine is darker in color and decreased amount from SN visit last week.    Please respond ASAP for lab to be obtained.    Thank you

## 2021-06-07 NOTE — TELEPHONE ENCOUNTER
Request for Orders    Who s Requesting: Home Care Physical Therapist    Orders being requested: PT 1w1, 2w3 with emphasis on gait, balance, strengthening, transfers.    Where to send Orders: Please respond via Epic.

## 2021-06-07 NOTE — TELEPHONE ENCOUNTER
He has requested.    Simple gentle reminder in that the stool must be liquid for the lab to accept and do C. difficile toxin assay.  Or testing.

## 2021-06-07 NOTE — TELEPHONE ENCOUNTER
General Surgery Follow Up Note    Shara Interiano CMA  12/9/2019  1:41 PM  Sign when Signing Visit  The patient was offered a chaperone declines..    Accompanied by: unaccompanied    Follow Up: Internal hemorrhoids with complication: Grade 3, right posterior and left lateral/ Rubber Band Ligation    History of Present Illness:    51 yo male, who is here for his first RBL of a grade III internal hemorrhoid.    Patient Active Problem List   Diagnosis   • Acute pharyngitis, unspecified   • Cellulitis of foot   • Hemorrhoids   • Encounter for screening colonoscopy; Last colonoscopy 2008   • Anal bleeding   • Internal hemorrhoids with complication: Grade 3, right posterior and left lateral, s/p RBL left lat       Past Medical History:   Diagnosis Date   • External hemorrhoids with complication    • Internal hemorrhoids with complication    • Rectal hemorrhage        Past Surgical History:   Procedure Laterality Date   • Appendectomy     • Finger surgery  2006    left middle    • Ligation of hemorrhoid(s) rubber band         Family History   Problem Relation Age of Onset   • Cancer, Skin Father    • Diabetes Father    • Cancer Paternal Uncle        Social History     Occupational History   • Occupation: sales   Tobacco Use   • Smoking status: Former Smoker   • Smokeless tobacco: Never Used   Substance and Sexual Activity   • Alcohol use: Yes     Comment: socially   • Drug use: Not on file   • Sexual activity: Not on file       Marital Status:  /Civil Union [2] with a healthy son    Review Of Systems:  Eye Problem(s):negative  ENT Problem(s):negative  Cardiovascular problem(s):negative  Respiratory problem(s):negative  Gastro-intestinal problem(s):negative  Genito-urinary problem(s):negative  Musculoskeletal problem(s):negative  Integumentary problem(s):negative  Neurological problem(s):negative  Psychiatric problem(s):negative  Endocrine problem(s):negative  Hematologic and/or Lymphatic  Okay for these orders? Thanks   problem(s):negative    Current Outpatient Medications   Medication Sig   • psyllium (METAMUCIL) 28 % packet Take 1 packet by mouth 2 times daily. Indications: 2 Tablespoons Daily     No current facility-administered medications for this visit.        Allergies:   ALLERGIES:  No Known Allergies    Vitals:  Blood pressure 128/84, pulse 78, temperature 98 °F (36.7 °C), height 6' 3\" (1.905 m), weight 111.7 kg (246 lb 3.2 oz).  Body mass index is 30.77 kg/m².    Physical Exam:    General: Awake and alert  Psych: Oriented x3  Eyes: Normal conjunctiva  Head: Normocephalic  Neck: Normal range of motion  Cardiac: Regular rate and rhythm  Pulmonary: Normal effort  Abdomen: round  Rectal: Anoscopy confirms a large inflamed left lat IH. This was rubber band ligated without difficulty, after informed consent. Appropriate post procedure instructions were provided.  Skin: Normal  Musculoskeletal: Normal range of motion    Assessment/Plan:    Internal hemorrhoids with complication: Grade 3, right posterior and left lateral, s/p RBL left lat  Follow up for 2nd RBL in 1 month.

## 2021-06-07 NOTE — TELEPHONE ENCOUNTER
Jose Lou for orders requested below? Will need someone to write up chair lift order for you to sign and fax to [facility].    Priscilla CORTEZ LPN .......... 2:12 PM  05/04/20

## 2021-06-07 NOTE — TELEPHONE ENCOUNTER
Question following Office Visit  When did you see your provider: 4/17/20    What is your question: Patient finished Cipro about one week ago and is having UTI symptoms again.  He is having dark urine, a foggy memory, no strength and loose bowel movements.  Patient does have a Akins catheter.  These are the symptoms he gets with UTI.      Home care nurse is coming at 11:30 today.  Does doctor want to order UA/UC or repeat antibiotic?    Okay to leave a detailed message: Yes

## 2021-06-07 NOTE — PROGRESS NOTES
Your patient was discharged from [facility] on 3/19/2020 because patient and family do not wish to have any outside visitors due to COVID-19 risks at this time.  Thank you for allowing us to provide care to this patient!  A Discharge summary is available upon requestâ  [phone]

## 2021-06-07 NOTE — TELEPHONE ENCOUNTER
All okay with me and please asked someone to write up the specific orders and I will be glad to sign.

## 2021-06-07 NOTE — TELEPHONE ENCOUNTER
Patient's daughter called requesting home care assistance with C diff testing.     Verbal order needed for home care nurse to collect c. Diff lab. Can this be completed tomorrow?

## 2021-06-07 NOTE — TELEPHONE ENCOUNTER
I called and schedule patient for 2:40 pm but please try to have Josie or you call sooner. The nurse will come and see the patient by 11:30 am so daughter would like some guidance prior to then. Thank you.

## 2021-06-07 NOTE — TELEPHONE ENCOUNTER
Start of care completed for patient. Requesting the following orders:    RN: 2w3, 1w1 for UTI monitoring,  GI/ assess, DM assess, skin integrity/pressure ulcer, home safety/future planning for safety d/t caregiver strain  PT: eval for strengthening and DME  OT: FAMILY HAVE PLACED ADAPTIVE EQUPTMENT IN BATHROOM, WANT TO LIMIT PEOPLE IN THE HOME, REFUSING OT  HHA: 3w4 for assistance with personal cares  MSW: eval for services available within the community and future planning for safety in the home    Thank you

## 2021-06-07 NOTE — TELEPHONE ENCOUNTER
"Patient called because he has diarrhea since late last week on 4-9-2020. He is not able to make it the bathroom in time and soils his clothes. He wears a catheter for urination.    His wife Sherley Perea was given permission by Levy to participate in the phone call. Sherley Perea was concerned that his medication may be causing the diarrhea, and she mentioned the patient is on metformin.    Patient transferred to [facility]  for a visit within the next three days. She will schedule him for tomorrow.    Patient verbalized understanding of care advice and telephone visit.    Nadira King RN 4/16 2020 at 3:24 pm    Reason for Disposition    MILD diarrhea (e.g., 1-3 or more stools than normal in past 24 hours) diarrhea without known cause and present > 7 days    Answer Assessment - Initial Assessment Questions  1. DIARRHEA SEVERITY: \"How bad is the diarrhea?\" \"How many extra stools have you had in the past 24 hours than normal?\"     - MILD: Few loose or mushy BMs; increase of 1-3 stools over normal daily number of stools; mild increase in ostomy output.    - MODERATE: Increase of 4-6 stools daily over normal; moderate increase in ostomy output.    - SEVERE (or Worst Possible): Increase of 7 or more stools daily over normal; moderate increase in ostomy output; incontinence.      Mild  2. ONSET: \"When did the diarrhea begin?\"       April 9, 2020  3. BM CONSISTENCY: \"How loose or watery is the diarrhea?\"       Loose milk shake consistency  4. VOMITING: \"Are you also vomiting?\" If so, ask: \"How many times in the past 24 hours?\"       No  5. ABDOMINAL PAIN: \"Are you having any abdominal pain?\" If yes: \"What does it feel like?\" (e.g., crampy, dull, intermittent, constant)       No  6. ABDOMINAL PAIN SEVERITY: If present, ask: \"How bad is the pain?\"  (e.g., Scale 1-10; mild, moderate, or severe)     - MILD (1-3): doesn't interfere with normal activities, abdomen soft and not tender to touch      - MODERATE (4-7): interferes " "with normal activities or awakens from sleep, tender to touch      - SEVERE (8-10): excruciating pain, doubled over, unable to do any normal activities        NA  7. ORAL INTAKE: If vomiting, \"Have you been able to drink liquids?\" \"How much fluids have you had in the past 24 hours?\"      NA, able to drink fluids okay  8. HYDRATION: \"Any signs of dehydration?\" (e.g., dry mouth [not just dry lips], too weak to stand, dizziness, new weight loss) \"When did you last urinate?\"      None  9. EXPOSURE: \"Have you traveled to a foreign country recently?\" \"Have you been exposed to anyone with diarrhea?\" \"Could you have eaten any food that was spoiled?\"      No  10. OTHER SYMPTOMS: \"Do you have any other symptoms?\" (e.g., fever, blood in stool)        No  11. PREGNANCY: \"Is there any chance you are pregnant?\" \"When was your last menstrual period?\"        No    Protocols used: DIARRHEA-A-OH      "

## 2021-06-07 NOTE — TELEPHONE ENCOUNTER
cipro 500 #20 1 two times a day called into pharmacy per Dr. Gan. He will call them tomorrow. If he worsens they should bring him to ER.

## 2021-06-07 NOTE — TELEPHONE ENCOUNTER
Pt daughter called in.  She is not with Pt at this time.  Has BM  2 time today.   has catheter.  Has walking issue.  Has BM incontinent.  The symptom started last Sunday.  Pt has memory issue more than normal.  Pt is weak loose his appetite,  Pt is drinking okay.  No rectal bleeding.  No abdominal pain.  No fever.  Sometime has little blood in urine.  Pt has fall more often.  The disposition is to be seen with in the next 24 hours.  Pt has phone appointment for tomorrow.  Care advice given per protocol.  Caller agrees with care advice given.   Agreed to call back if he has additional symptoms or questions.        Miguel Mora RN, [facility] Triage/Med Refill 4/16/2020 4:14 PM        Reason for Disposition    Leaking stool    Protocols used: CONSTIPATION-A-AH

## 2021-06-07 NOTE — TELEPHONE ENCOUNTER
FYI - Status Update  Who is Calling: Child  Update: Blood sugars are still not regulated his blood sugar this morning was 170. Patient ate - hasn't taken any metformin since Saturday. Er doctor took him off. Please advise  Okay to leave a detailed message?:  Yes

## 2021-06-07 NOTE — TELEPHONE ENCOUNTER
Spoke with the patient's daughter, Aneta, and helped to schedule a phone visit with Dr. Gan this afternoon.  She had no further questions at this time.  Emma URBANO, ANGELA/CMT....................1:07 PM

## 2021-06-07 NOTE — TELEPHONE ENCOUNTER
Request for Orders    Who s Requesting: Home Care Occupational Therapist    Orders being requested:     Please fax patient's face sheet and an order for a standard manual wheelchair and mechanical lift chair to [facility]:    Fax: [phone]      Thank you,    Vanna Campos, OTR/L

## 2021-06-07 NOTE — PROGRESS NOTES
"Levy Alba is a 80 y.o. male who is being evaluated via a billable telephone visit.      The patient has been notified of following:     \"This telephone visit will be conducted via a call between you and your physician/provider. We have found that certain health care needs can be provided without the need for a physical exam.  This service lets us provide the care you need with a short phone conversation.  If a prescription is necessary we can send it directly to your pharmacy.  If lab work is needed we can place an order for that and you can then stop by our lab to have the test done at a later time.    Telephone visits are billed at different rates depending on your insurance coverage. During this emergency period, for some insurers they may be billed the same as an in-person visit.  Please reach out to your insurance provider with any questions.    If during the course of the call the physician/provider feels a telephone visit is not appropriate, you will not be charged for this service.\"    Patient has given verbal consent to a Telephone visit? Yes    What phone number would you like to be contacted at?     Patient would like to receive their AVS by AVS Preference: Mail a copy.    Additional provider notes: Hypertension.  Outside blood pressure readings reviewed.    Postprandial diarrhea is noted Metamucil and Imodium advised.  We had a lengthy discussion.  Otherwise denies chest pain shortness of breath.    No blood in stool or urine medication list reviewed reconciled.    The patient is currently not on antibiotics he denies chest pain or shortness of breath he is quite informed and is difficult for him to do a telephone visit let alone a virtual video visit.  His wife is very helpful and feels in today.    Assessment/Plan:  Hypertension controlled outside readings reviewed.    Multi-organ system comorbidities.    Diarrhea irritable bowel syndrome.  Suggest Metamucil probiotics plus Imodium AD going slow " with the latter.    Meds and cares otherwise same call in 1 week's time regarding status.  Preferably telephone or video virtual visit.    Addendum now talking with daughter regarding Mr. Alba.    2 normal bowel movements today.  The patient's wife may have some cognitive decline with interferes with her interpretation of patient's symptoms.  Urine is now darker and the patient is weaker according to the patient's daughter.  The patient may have another urinary tract infection.  But the visiting nurse was there this morning and got a urine specimen.  Let us check a urinalysis today as well.      Phone call duration:  11 minutes    Belkys Paul CMA    This patient requires a manual wheelchair as he is unable to perform mobility related activities of daily living without this equipment due to his poor found weakness and general disability and failure to thrive.    SALVADOR Gan MD.  Attending physician and internal medicine specialist.

## 2021-06-07 NOTE — TELEPHONE ENCOUNTER
In discussion with patient's daughter, Aneta, patient is requesting no visits to home due to coronavirus. We will be discontinuing home care services (he was receiving PT only) and will require new referral to start if and when appropriate. Thank you for allowing us to treat this patient.

## 2021-06-08 NOTE — TELEPHONE ENCOUNTER
Patient's wife calls today concerned about blood in the patient's rodriguez catheter. She is quite anxious over the phone. She states the patient has a chronic rodriguez catheter. Yesterday the catheter was changed by the home nurse. He has had some blood in the urine since then. She also notes some small clots. States the bleeding has not lessened since yesterday. Denies fevers, denies any uretheral pain, groin pain, or back pain. She has checked the catheter to make sure it is not kinked.    I see that the patient had a UTI at the beginning of April. He had a follow-up UA done yesterday. The UA noted small amount of blood in urine and small amount of leukocytes.    I advised patient's wife that a sometimes there is a little bleeding after changing a catheter. Changing the tube can cause some irritation in the urethra and sometimes a little bit of blood. However this should resolve shortly and should not last more than a day. Patient's wife states she called the home nurse agency multiple times today about the issue. They directed her to call EMS or to take him in to be seen in the ED. She states she does not want to do that at this time.    I advised patient that I do not think an ED visit is needed at this moment. The patient does not have fevers, is not showing signs of infection and just had the UA done yesterday. However I am not sure why he is having the hematuria at this point. She should help patient increase his fluid intake.  I will also set up patient and his wife to have telephone appt with provider this afternoon to discuss the issue and get direction on what they should do. Patient's wife verbalizes understanding of this plan. Telephone appt scheduled at 330pm today with Dr. Burgos.     Chloé Waller, CARISSA          Reason for Disposition    [1] Bloody or red-colored urine  AND [2] no recent prostate or bladder surgery  (Exception: brief episode and urine now clear)    Answer Assessment - Initial Assessment  "Questions  1. SYMPTOMS: \"What symptoms are you concerned about?\"      Hematuria. Blood in catheter and small clots.  2. ONSET:  \"When did the symptoms start?\"      Yesterday.  3. FEVER: \"Is there a fever?\" If so, ask: \"What is the temperature, how was it measured, and when did it start?\"      denies  4. ABDOMINAL PAIN: \"Is there any abdominal pain?\" (e.g., Scale 1-10; or mild, moderate, severe)      Denies any pain.  5. URINE COLOR: \"What color is the urine?\"  \"Is there blood present in the urine?\" (e.g., clear, yellow, cloudy, tea-colored, blood streaks, bright red)      Urine is red tinged. Has some small clots of blood.  6. ONSET: \"When was the catheter inserted?\"      Chronic catheter was changed yesterday by home nurse.  7. OTHER SYMPTOMS: \"Do you have any other symptoms?\" (e.g., back pain, bad urine odor)       Denies additional symptoms.  8. PREGNANCY: \"Is there any chance you are pregnant?\" \"When was your last menstrual period?\"      no    Protocols used: URINARY CATHETER SYMPTOMS AND [id number]-NP      "

## 2021-06-08 NOTE — TELEPHONE ENCOUNTER
Patient Returning Call  Reason for call:  Daughter calling back on this message left earlier  Information relayed to patient:  Daughter is aware the message is open for PCP's review.  Patient has additional questions:  Yes  If YES, what are your questions/concerns:  Daughter would appreciate a call ASAP as she is the person who sets up the patient's and her mothers medications. Needs clarification on how to be taking this medication and if there was a change is he to finish all the pills?  Okay to leave a detailed message?: Yes

## 2021-06-08 NOTE — TELEPHONE ENCOUNTER
Medication Question or Clarification  Who is calling: awa Cornell   What medication are you calling about (include dose and sig)?:    Disp  Refills  Start  End     cephalexin (KEFLEX) 500 MG capsule  30 capsule  1  5/7/2020 5/17/2020     Sig - Route: Take 1 capsule (500 mg total) by mouth 3 (three) times a day for 10 days. - Oral     Sent to pharmacy as: cephalexin 500 mg capsule (KEFLEX)     E-Prescribing Status: Receipt confirmed by pharmacy (5/7/2020  9:28 AM CDT)         Who prescribed the medication?: Regino Gan MD   What is your question/concern?: caller stated that she is needing a call to explain why or did Regino Gan MD really increase the medication. Caller was relay from yesterday's notes from Regino Gan MD (Cephalexin was advised to decrease to 1 a day and or take it with food and plenty of fluids.)  Caller stated that she has some questions to, will decreasing it still help with the bacteria?   Requested Pharmacy:   Okay to leave a detailed message?: Yes

## 2021-06-08 NOTE — TELEPHONE ENCOUNTER
Left message to call back for: Gab, patient's wife  Information to relay to patient:  Please relay message below from Dr. Gan and help her to schedule an appointment as requested by Dr. Gan.  Thank you.  Emma URBANO CMA/CMT....................8:28 AM

## 2021-06-08 NOTE — TELEPHONE ENCOUNTER
Gopal Gan,     I saw Levy in the home today. tr Aneta is concerned wife informed her that during your telephone visit with wife you will like patient to decrease keflex 500mg 1 cap three times a day to Keflex 500 mg 1 cap po daily to assist with loose stools.     Aneta would like some more directions on this. She would like to know if patient should then just take keflex 500 mg 1 cap x 10 days only or finish the abx pills that is in the home now. tr reports in that case patient will take it daily for the next two months or so.     In addition she is also concern by taking a lesser dose will this help clear UTI faster or linger on. If this is the case should he be taking abx two times a day instead.     Please review and advise.     Jami KRIKPATRICK RN case manager

## 2021-06-08 NOTE — PROGRESS NOTES
TCM DISCHARGE FOLLOW UP CALL    Discharge Date:  5/30/2020  Reason for hospital stay (discharge diagnosis)::  N/V, dehydration  Are you feeling better, the same or worse since your discharge?:  Patient is feeling better (Per wife, pt is eating better. He is weak. (Wife wouldn't allow this RN to speak with her dtr, Aneta, who should be providing 24 hr care.))  Do you feel like you have a plan in the event of a health emergency?: Yes (dtr)    As part of your discharge plan, were  home care services ordered for you?: Yes    Have you seen them yet, or are they scheduled to visit?: Yes    Do you have any follow up visits scheduled with your PCP or Specialist?:  No  I'm glad to hear you're doing well and we want you to continue to do well. Your PCP would like to see you for a follow-up visit. Can we help set that up for your today?: No    (RN) Provided patient the PCP's phone number to call if they have any questions or concerns?: No (Wife states she has the clinic phone number and will have her dtr make an appt.)

## 2021-06-08 NOTE — PROGRESS NOTES
Your pt was discharged from [facility] because goals met, education complete, family is able to complete wound cares no other skilled nursing need. Thank-you for allowing us to provide care to this patient! A discharge summary is available upon request at [phone].

## 2021-06-08 NOTE — TELEPHONE ENCOUNTER
OK to move RESUMPTION OF CARE from tomorrow 6/1 to Tuesday, 6/2 per family request so all children can be present with social work and RN at visit?

## 2021-06-08 NOTE — PROGRESS NOTES
"Levy Alba is a 80 y.o. male who is being evaluated via a billable telephone visit.      The patient has been notified of following:     \"This telephone visit will be conducted via a call between you and your physician/provider. We have found that certain health care needs can be provided without the need for a physical exam.  This service lets us provide the care you need with a short phone conversation.  If a prescription is necessary we can send it directly to your pharmacy.  If lab work is needed we can place an order for that and you can then stop by our lab to have the test done at a later time.    Telephone visits are billed at different rates depending on your insurance coverage. During this emergency period, for some insurers they may be billed the same as an in-person visit.  Please reach out to your insurance provider with any questions.    If during the course of the call the physician/provider feels a telephone visit is not appropriate, you will not be charged for this service.\"    Patient has given verbal consent to a Telephone visit? Yes    What phone number would you like to be contacted at? [phone]    Patient would like to receive their AVS by AVS Preference: Ulises.    Additional provider notes: UTI.    Intolerant of cephalexin has taken it for 5 to 7 days.  Was supposed to take 500 mg twice a day for 15 days.    The patient feels he is having a side effect from cephalexin.    There is no rash or trouble breathing no wheezing.    The patient has no other drug allergies like cephalexin.  There is no rash.    The patient denies blood in stool or urine medication list reviewed well-tolerated normal effects reconciled.    Denies chest pain shortness of breath but is extremely weak in his legs which he attributes to cephalexin therapy for UTI.  His wife was speaking in the background no other family members were present physical therapy was ordered.    Assessment/Plan:  UTI.  Recheck UA UC 1 " more dose of cephalexin tonight and urinalysis and culture before end of week today is Tuesday.    Profound weakness may be related to antibiotic therapy and/or UTI and/or generalized weakness from advanced age and comorbidities.  Physical therapy to continue at home.  Orders have been completed.      Phone call duration:  11 minutes

## 2021-06-08 NOTE — TELEPHONE ENCOUNTER
Okay   PT RESTING IN BED, NO SIGNS OF DISTRESS NOTED, RESP EVEN AND UNLABORED. PT
VOICES NO NEEDS OR COMPLAINTS AT THIS TIME. CALL LIGHT IN REACH, CONTINUE TO
MONITOR.

## 2021-06-08 NOTE — TELEPHONE ENCOUNTER
Who is calling:  Aneta   Reason for Call:  Caller is needing for Regino Gan MD to call her now. Caller is upset that Regino Gan MD is not returning her call. Caller stated that this is very important to her because her parents both have dementia. Caller is upset that Regino Gan MD called them anyway when he should know that they aren't able to remember much. Caller is needing that call back as soon as possible to discuss what was spoke at the telephone call visit. Caller stated that now patient is refusing to take medications and spouse don't remember what was spoken of.   Date of last appointment with primary care:   Okay to leave a detailed message: Yes

## 2021-06-08 NOTE — TELEPHONE ENCOUNTER
IRMA   I am really concerned about pt's safety in his current home situation. Pt's wife is refusing services come into the home including privately hiring care, [facility], doesn't want a lift chair for ease of transfers and comfort to the pt. Concerns that wife may have her own health concerns that are getting in the way of pt getting his needs met.   Have discussed concerns with daughter and son.   Daughter is power of , educated daughter on vernon within that document.

## 2021-06-08 NOTE — TELEPHONE ENCOUNTER
I Called and spoke with patient's daughter Aneta we had a lengthy discussion regarding cephalexin 500 mg twice a day to be completed today and then check a urinalysis and culture sometime within the next 24 to 72 hours.    We also discussed physical therapy.  And I told the patient's daughter that we should continue it and that I ordered it earlier this morning.

## 2021-06-08 NOTE — TELEPHONE ENCOUNTER
Request for Orders    Who s Requesting: Home Care Occupational Therapist    Orders being requested: Continued OT 2 visits in 21 days for caregiver training with bathing    HHA 2w1 for bathing assistance    Where to send Orders: Respond through Bernard Campos OTR/L

## 2021-06-08 NOTE — TELEPHONE ENCOUNTER
I spoke with Katherine- she is quite upset that verbal orders were given to her mother (connie's wife) as she has dementia- also she states he mom ove-rexaggerates symptoms that patient is having    She would only be able to do a phone visit w/patient  tomorrow morning( however I did not double book)- and does not want to wait until Friday to get an answer    They do have homecare nurse-however they are not doing med set-up    Katherine is not on patient's consent form so I did not give her any information    She states she wants PCP to call and discuss the cephalexin dosing- if he is taking it once daily is he to continue doing that for the 30 days then?     He is still having some loose stools- he took two of the cephalexin today-and was given some Immodium

## 2021-06-08 NOTE — TELEPHONE ENCOUNTER
Caller is wife, Sherley Perea. Wife is requesting a call back from pt's PCP.   Looking for some advice regarding pt's care in the hospital. Pt is currently admitted at [facility]. Wife understands that PCP may not review message until after the holiday and PCP does not cover hospital visits.Will route message to PCP per wife's request.     Wife states pt went to the ER because he was vomiting unable to eat or drink anything and was told now pt is now in critical condition.   Wife is very tearful and states she does not know what's going on with pt and his status.   Advised wife to call the hospital unit and speak with the charge nurse. Wife verbalizes understanding.     Emily Adamson RN Triage Nurse Advisor 4:00 PM    Reason for Disposition    Information only question and nurse able to answer    Protocols used: INFORMATION ONLY CALL - NO TRIAGE-A-OH

## 2021-06-08 NOTE — TELEPHONE ENCOUNTER
Who is calling:  Aneta, awa  Reason for Call:  Caller stated that she would like a call when the prescription for Nystatin powder is ready so she can pick it up. Please sent prescription if appropriate.  Date of last appointment with primary care: n/a  Okay to leave a detailed message: Yes  [phone]

## 2021-06-08 NOTE — PROGRESS NOTES
HOME HEALTH MISSED VISIT NOTIFICATION (FYI)     Your patient who receives home health services missed a visit on: 5/12/2020       Type of service missed: Home Health Aide    Reason for missed visit (pick applicable reason):          Patient/Family refused (explain): Patient family help Patient with bathing cancel HHA visit     Provider(s) to be notified:  (if NOT in our system)    This request is sent as an inbasket message to p  his,   , and provider if in our system.     Regino Gan MD; Jami Agustin, RN

## 2021-06-08 NOTE — TELEPHONE ENCOUNTER
Addendum must state that patient requires use a manual wheelchair and mechanical lift chair as he is unable to safely perform mobility related activities of daily living without this equipment due to his **put diagnosis here**

## 2021-06-08 NOTE — TELEPHONE ENCOUNTER
Please check with patient's daughter to see if she agrees with these orders.  If the daughter agrees let me know.

## 2021-06-08 NOTE — TELEPHONE ENCOUNTER
Who is calling:  Patients wife     Reason for Call:  Patient has been in hospital since Sat 05/23 - wife would like to speak to Dr. Gan about releasing patient so he can be under Dr. Gan's care. Patients wife would really like to speak to Dr. Gan or from someone on his team.     Date of last appointment with primary care: NA    Okay to leave a detailed message: Yes

## 2021-06-08 NOTE — TELEPHONE ENCOUNTER
Patient's spouse called requesting patient be seen again for PT eval. She states that patient is getting weaker and not moving around much.     Requesting verbal order for PT eval.

## 2021-06-08 NOTE — TELEPHONE ENCOUNTER
RN Triage:    Patient's wife is calling with c/o:  -diarrhea  -weakness  -vomited once today    States that patient started taking Cephalexin 500 mg (takes 3x/day for 10 days) and now developed the symptoms listed above. Patient is on day 4 of medication    States he has been extremely weak lately but can't describe it further    Has had about 5 bouts of diarrhea today. Says some of the stool is loose and some is more formed.    Patient also did vomit once today after he ate lunch     Denies blood in stool or vomit  Denies abdominal pain, fever, muscle aches      PLAN:  Per protocol, patient should be evaluated. Warm transfer to scheduling to set up a telephone visit with Dr. Gan for tomorrow (5/12) at 1:30 pm   Patient is to call back if his symptoms get worse or if other questions arise.  Writer did encourage patient to eat foods that are high in probiotics d/t taking an antibiotic and also informed patient that unfortunately the stooling is sometimes a side effect from antibiotics.        Reason for Disposition    [1] MODERATE diarrhea (e.g., 4-6 times / day more than normal) AND [2] age > 70 years    [facility] Specific Disposition  - REQUIRED: [facility] Specific Patient Instructions  COVID 19 Nurse Triage Plan/Patient Instructions    Please be aware that novel coronavirus (COVID-19) may be circulating in the community. If you develop symptoms such as fever, cough, or SOB or if you have concerns about the presence of another infection including coronavirus (COVID-19), please contact your health care provider or visit [email or web address].       Additional COVID19 information to add for patients.       Additional General Information About COVID-19    Whether or not you've been tested for COVID-19    Stay home and away from others (self-isolate) until:  At least 10 days have passed since your symptoms started. And   You've had no fever--and no medicine that reduces fever--for 3 full days (72 hours).  And    Your other symptoms have resolved (gotten better).     During this time:  Stay in your own room (and use your own bathroom), if you can.  Stay away from others in your home. No hugging, kissing or shaking hands.  No visitors.  Don't go to work, school or anywhere else.   Clean  high touch  surfaces often (doorknobs, counters, handles, etc.). Use a household cleaning spray or wipes.  Cover your mouth and nose with a mask, tissue or wash cloth to avoid spreading germs.  Wash your hands and face often. Use soap and water.  For more tips, go to https://www.cdc.gov/coronavirus/2019-ncov/downloads/10Things.pdf.    How can I take care of myself?    Get lots of rest. Drink extra fluids (unless a doctor has told you not to).     Take Tylenol (acetaminophen) for fever or pain. If you have liver or kidney problems, ask your family doctor if it's okay to take Tylenol.     Adults can take either:   650 mg (two 325 mg pills) every 4 to 6 hours, or   1,000 mg (two 500 mg pills) every 8 hours as needed.   Note: Don't take more than 3,000 mg in one day.   Acetaminophen is found in many medicines (both prescribed and over-the-counter medicines). Read all labels to be sure you don't take too much.   For children, check the Tylenol bottle for the right dose. The dose is based on  the child's age or weight.    If you have other health problems (like cancer, heart failure, an organ transplant or severe kidney disease): Call your specialty clinic if you don't feel better in the next 2 days.    Know when to call 911: If your breathing is so bad that it keeps you from doing normal activities, call 911 or go to the emergency room. Tell them that you've been staying home and may have COVID-19..      What are the symptoms of COVID-19?   The most common symptoms are cough, fever and trouble breathing.   Less common symptoms include body aches, chills, diarrhea (loose, watery poops), fatigue (feeling very tired), headache, runny nose,  sore throat and loss of smell.   COVID-19 can cause severe coughing (bronchitis) and lung infection (pneumonia).    How does it spread?   The virus may spread when a person coughs or sneezes into the air. The virus can travel about 6 feet this way, and it can live on surfaces.    Common  (household disinfectants) will kill the virus.    Who is at risk?  Anyone can catch COVID-19 if they're around someone who has the virus.    How can others protect themselves?   Stay away from people who have COVID-19 (or symptoms of COVID-19).  Wash hands often with soap and water. Or, use hand  with at least 60% alcohol.  Avoid touching the eyes, nose or mouth.   Wear a face mask when you go out in public, when sick or when caring for a sick person.      For more about COVID-19 and caring for yourself at home, please visit the CDC website at https://www.cdc.gov/coronavirus/2019-ncov/about/steps-when-sick.html.     To learn about care at [facility], go to [email or web address].    Below are the COVID-19 hotlines at the [place] [facility] ([facility]). Interpreters are available.   For health questions: Call 842-839-4736 or 1-711.705.6042 (7 a.m. to 7 p.m.)  For questions about schools and childcare: Call 545-439-2507 or 1-189.807.2541 (7 a.m. to 7 p.m.)        Thank you for limiting contact with others, wearing a simple mask to cover your cough, practice good hand hygiene habits and accessing our virtual services where possible to limit the spread of this virus.    For more information about COVID19 and options for caring for yourself at home, please visit the CDC website at https://www.cdc.gov/coronavirus/2019-ncov/about/steps-when-sick.html  For more options for care at [facility], please visit our website at [email or web address]    For more information, please use the [place] [facility] ([facility]) COVID-19 Hotlines (Interpreters  available):   Health questions: Phone Number: 107.994.8781 or 1-612.235.9495 and Hours: 7 a.m. to 7 p.m.  Schools and  questions: Phone Number: [phone] or 1-654.380.5043 and Hours 7 a.m. to 7 p.m.    Protocols used: DIARRHEA-A-AH, CORONAVIRUS (COVID-19) EXPOSURE-A-AH 4.22.20

## 2021-06-08 NOTE — TELEPHONE ENCOUNTER
Medication Request  Medication name: Zyrtec  Requested Pharmacy: [facility]  Reason for request: Daughter wondering if it would be safe to have patient on zyrtec with all the other medications he is on. States the benadryl is not working for him any more.  Please advise.    Okay to leave a detailed message: yes

## 2021-06-08 NOTE — TELEPHONE ENCOUNTER
Please asked patient to make a video virtual visit with me for telephone visit with me to review and discuss.

## 2021-06-08 NOTE — PROGRESS NOTES
"Levy Alba is a 80 y.o. male who is being evaluated via a billable telephone visit.      The patient has been notified of following:     \"This telephone visit will be conducted via a call between you and your physician/provider. We have found that certain health care needs can be provided without the need for a physical exam.  This service lets us provide the care you need with a short phone conversation.  If a prescription is necessary we can send it directly to your pharmacy.  If lab work is needed we can place an order for that and you can then stop by our lab to have the test done at a later time.    Telephone visits are billed at different rates depending on your insurance coverage. During this emergency period, for some insurers they may be billed the same as an in-person visit.  Please reach out to your insurance provider with any questions.    If during the course of the call the physician/provider feels a telephone visit is not appropriate, you will not be charged for this service.\"    Patient has given verbal consent to a Telephone visit? Yes    What phone number would you like to be contacted at? [phone]    Patient would like to receive their AVS by AVS Preference: Ulises.    Additional provider notes: UTI.    Currently on cephalexin 500 mg 4 times a day.  Yesterday was a bad day with 4 or 5 loose bowel movements but no watery stools.  Also vomited dinner.    Today he feels better he is continuing on his Metamucil.  The formed bowel movements have returned.  He notes no blood in stool or urine the report is given by his wife Sherley Perea earlier our staff spoke to his daughter Katherine.    Medication list reviewed reconciled.    Cephalexin was advised to decrease to 1 a day and or take it with food and plenty of fluids.    Medication list reviewed reconciled.    No blood in stool or urine generally weak.  Not truly watery loose bowel movements formed bowel movements just 4 or 5 in number yesterday.  " No nocturnal diarrhea.    Assessment/Plan:  UTI on cephalexin with some change in bowel movement may be related to cephalexin therapy.  Decrease the latter to once daily.  Suggest continuation of Metamucil plenty of fluids rest.    Multiple allergies including amiodarone and adhesive tape.    Dementia wife feels in.  As to his daughter Nisa.    Hypertension and history of coronary artery disease with hyperlipidemia and history of acute myocardial infarction.  Stable without chest pain or shortness of breath.      Phone call duration:  11 minutes    Belkys Paul, CMA

## 2021-06-08 NOTE — TELEPHONE ENCOUNTER
Spoke with the patient's son and helped to schedule an appointment with Dr. Gan on Friday as requested.  They will also need to do a re-certification for home care at that time.  There were no further questions at this time.  Emma URBANO CMA/SAMIA....................11:15 AM

## 2021-06-08 NOTE — TELEPHONE ENCOUNTER
Request for Orders    Who s Requesting: Home Care Occupational Therapist    Orders being requested: Home health aid 2w1 for bathing assistance    Where to send Orders: Respond through Bernard Campos OTR/L

## 2021-06-08 NOTE — TELEPHONE ENCOUNTER
All your concerns are well taken and correct.  Patient probably belongs to the nursing home.  I did a video virtual visit with the patient his son and his wife earlier this morning.  His urinalysis is all clear and urine culture is clear there is no sign of infection please call for me.  The next step if the patient worsens is for him to go to the most nearby [facility] emergency department for further evaluation and treatment.  Ultimately he may require nursing home placement.

## 2021-06-08 NOTE — PROGRESS NOTES
"Levy Alba is a 80 y.o. male who is being evaluated via a billable video visit.      The patient has been notified of following:     \"This video visit will be conducted via a call between you and your physician/provider. We have found that certain health care needs can be provided without the need for an in-person physical exam.  This service lets us provide the care you need with a video conversation.  If a prescription is necessary we can send it directly to your pharmacy.  If lab work is needed we can place an order for that and you can then stop by our lab to have the test done at a later time.    Video visits are billed at different rates depending on your insurance coverage. Please reach out to your insurance provider with any questions.    If during the course of the call the physician/provider feels a video visit is not appropriate, you will not be charged for this service.\"    Patient has given verbal consent to a Video visit? Yes    Patient would like to receive their AVS by AVS Preference: Mail a copy.    Patient would like the video invitation sent by: Text to cell phone: [phone]    Will anyone else be joining your video visit? No    Video Start Time: 9:29AM    Additional provider notes: Urinary tract infection.  1 son was present.  Patient appeared better urine was draining clear.  Recent urinalysis report back shows only minimal trace leukocytes and a few bacteria.  Final urine culture is pending we will phone him with the results either way whether this negative or positive.  Yesterday he took his last dose of cephalexin 500 mg twice daily.    The patient denies chest pain or shortness of breath on direct questioning he does have underlying dementia but is able to answer there is no blood in stool or urine he wears a Akins catheter bag.    Medication list reviewed reconciled in the chart generally well-tolerated.  His wife is convinced he is better off the antibiotic.  His son feels that " the antibiotic may have cleared the urinary tract infection making him feel better.      Video-Visit Details    Type of service:  Video Visit    Video End Time (time video stopped): 9:40 AM  Originating Location (pt. Location): Home    Distant Location (provider location):  [facility] INTERNAL MEDICINE     Platform used for Video Visit: Fariba Gan MD

## 2021-06-08 NOTE — TELEPHONE ENCOUNTER
FROM: [facility] RN    Alexa Jansen,     Pt seen in home today.     Please clarify what ABX pt should be taking and add to Epic med list.     Also I understand there is an order for a UA/UC, Can this be obtained on 5/21/20 with next catheter change by homecare nurse?    Please respond in Epic.     Thank-you,   Thelma Adamson, RN  [facility]  [phone]

## 2021-06-08 NOTE — TELEPHONE ENCOUNTER
"Who is calling:  Pt's wife Sherley Perea  Reason for Call:  Wife is calling due to Pt seeming weaker.  Wife states Pt has an infection he was placed on a \"Dark green pill\", and a \"light green pill\", and wife wants to know if this is the reason his legs seem to be weaker.  Wife also states PT has run out and that was really helping Pt.  Wife states something isn't right, maybe something different? She would like a call from provider.  Please advise.  Date of last appointment with primary care: N/A  Okay to leave a detailed message: No      "

## 2021-06-08 NOTE — TELEPHONE ENCOUNTER
Who is calling:  awa Cornell  Reason for Call:  Caller is upset that Regino Gan MD had called to patient instead of call her phone line to do the telephone visit. Caller stated that Regino Gan MD should of never called patient without her there due to having miscommunications. Caller is demanding for Rgeino Gan MD to call back on her phone number as soon as possible to go over what was talked about. Caller stated she is not happy that this keeps happening when she specifically told the  to have Regino Gan MD call her phone line instead of calling to patient.    Date of last appointment with primary care:   Okay to leave a detailed message: Yes

## 2021-06-08 NOTE — TELEPHONE ENCOUNTER
Paperwork has been faxed to [facility] at [phone] as requested.  Emma URBANO CMA/SAMIA....................9:00 AM

## 2021-06-08 NOTE — TELEPHONE ENCOUNTER
Needs telephone visit or virtual video visit with me to discuss and review other medications with patient and family before Zyrtec is okay by me.

## 2021-06-08 NOTE — PROGRESS NOTES
HOME HEALTH MISSED VISIT NOTIFICATION (FYI)     Your patient who receives home health services missed a visit on:  5-      Type of service missed: Home Health Aide    Reason for missed visit (pick applicable reason):          Other (explain): Contacted DTR to set up time to see Patient. DTR cancel HHA visit    Provider(s) to be notified:  (if NOT in our system)    This request is sent as an inbasket message to p tino clemente, case dallas torres, and provider if in our system.     Regino Gan MD; Jami Agustin RN;

## 2021-06-08 NOTE — TELEPHONE ENCOUNTER
"MTM referral from: Transition of Care    MTM referral outreach attempt #1 on Monday, 06/01/2020 at 11:37 AM      Outcome: Spoke with patient's daughter Aneta regarding MTM referral - Aneta declined services on patient's behalf and stated \"We're on his medications. He doesn't need it.\" Will route message to MTM Pharmacist/Provider as a FYI.    Thank you,  See Vamsi [facility] Pharmacy Coordinator    "

## 2021-06-08 NOTE — TELEPHONE ENCOUNTER
Sherley Perea patient's wife called and said that they was a lot of blood in Levy's catheter. I called and spoke with [facility] and they said that the evening nurse Alice is going out to their house tonight to check on him.  I notified Levy and his daughter that [facility] will be coming out tonight to check on his catheter.

## 2021-06-08 NOTE — TELEPHONE ENCOUNTER
Left message to call back for: Aneta  Information to relay to patient:  LMOM-advised to call back to set up Telephone or video visit to discuss meds- can be done this week if they wish.

## 2021-06-08 NOTE — TELEPHONE ENCOUNTER
Spoke with Nasrin and relayed message below from Dr. Gan.  She verbalized understanding and had no further questions at this time.  Emma URBANO, ANGELA/SAMIA....................9:00 AM

## 2021-06-08 NOTE — PROGRESS NOTES
HOME HEALTH MISSED VISIT NOTIFICATION (FYI)     Your patient who receives home health services missed a visit on:  5/15/2020      Type of service missed:Home Health Aide     Reason for missed visit (pick applicable reason):          Patient/Family refused (explain): Called Patient to inform HHA in on the way to home for visit Patient told wife to cancel HHA visit. Patient was going to bed    Provider(s) to be notified:  (if NOT in our s  ystem)    This request is sent as an inbasket message to p  his, , and provider if in our system.   Regino Gan MD; Jami Agustin RN;

## 2021-06-08 NOTE — TELEPHONE ENCOUNTER
Dr. Gan,    Spoke with daughter Aneta and she stated she is okay with these orders.    They had a family meeting yesterday with OT and SW to develop a plan to install bath bench, safety bars, etc and OT will assist in ensuring everyone knows how to use these items to ensure safety for Levy.    Please review and advise if okay for these orders.    Thank you.    Priscilla CORTEZ LPN .......... 8:40 AM  05/21/20  [facility]

## 2021-06-08 NOTE — TELEPHONE ENCOUNTER
Okay for [facility].    I discussed with the patient with the patient himself as well as his wife both in attendance during a telephone visit.    I suggested that he take 1 more tablet of cephalexin 500 mg later this afternoon and sometime before the end of this week Friday, May 2 he get a urinalysis and urine culture done.  Clean-catch midstream.    Many thanks for all your help and support.

## 2021-06-08 NOTE — TELEPHONE ENCOUNTER
Upcoming Appointment Question  When is the appointment: Tomorrow  What is your appointment for?: resume care  Who is your appointment scheduled with?: Home care  What is your question/concern?: Nasrin [phone] from home care is calling to get the date changed from 6-1-20 to 6/2/20  Okay to leave a detailed message?: Yes

## 2021-06-08 NOTE — PROGRESS NOTES
"Levy Alba is a 80 y.o. male who is being evaluated via a billable telephone visit.      The patient has been notified of following:     \"This telephone visit will be conducted via a call between you and your physician/provider. We have found that certain health care needs can be provided without the need for a physical exam.  This service lets us provide the care you need with a short phone conversation.  If a prescription is necessary we can send it directly to your pharmacy.  If lab work is needed we can place an order for that and you can then stop by our lab to have the test done at a later time.    Telephone visits are billed at different rates depending on your insurance coverage. During this emergency period, for some insurers they may be billed the same as an in-person visit.  Please reach out to your insurance provider with any questions.    If during the course of the call the physician/provider feels a telephone visit is not appropriate, you will not be charged for this service.\"    Patient has given verbal consent to a Telephone visit? Yes    What phone number would you like to be contacted at? [phone]    Patient would like to receive their AVS by AVS Preference: Mail a copy.    Additional provider notes: Cough with history of aspiration and history of COVID-19 infection with multiple comorbidities.  Discussed aspiration pneumonia and the patient has a cough it is a hard copies week he has been upset to his stomach I think he is vomited 2 or 3 times during the night.  At risk for aspiration pneumonia.  There is no blood in stool urinary sputum medication list reviewed reconciled in the chart.  He has an allergy to codeine.  Precluding use of Cheratussin with codeine.    Assessment/Plan:  Cough may represent aspiration pneumonia or at high risk for same.  Try levofloxacin 750 mg daily for 5 days plus Tessalon Perles for cough suppressant.  Patient is allergic to codeine and penicillin.  Call " in 5 days time regarding status may ultimately require emergency room evaluation at the most nearby [facility] system.      Phone call duration:  21 minutes    Belkys Paul CMA

## 2021-06-08 NOTE — TELEPHONE ENCOUNTER
The daughter is correct.    Increase cephalexin to 500 mg twice daily.  And call in 1 week's time for telephone visit or video virtual visit to review and discuss.

## 2021-06-08 NOTE — TELEPHONE ENCOUNTER
Gopal Gan,     I saw Levy today in the home after hosptial stay with dehydration.   Would it be okay with you for home care orders as follows;    Snv 2w2- to monitor groin rash/coccyx pressure wound, rodriguez cath cares/s/sx of uti    PT evaulation for weakness    MSW order to assist with community resources.       I also observed both patient's groin with rash/patch to both groin area. Appears to be fungal infection.  Family request for something to assist with this. Would you like to order nystatin for this?    Will it be okay for snv to apply hydrocolloid drsg to coccyx wound to assist with wound healing 1x/wk and as needed.       Please review and advise.     Jami KIRKPATRICK RN Case Manager

## 2021-06-08 NOTE — TELEPHONE ENCOUNTER
Request for Orders    Who s Requesting: Home Care Occupational Therapist    Orders being requested:     Please fax patient's face sheet and an order for a manual wheelchair and mechanical lift chair [facility]     Fax: [phone]    Thank you,    Vanna Campos, OTR/L

## 2021-06-08 NOTE — TELEPHONE ENCOUNTER
SW and OT are planning to do a care conference with family tomorrow. Can I please have an order for a follow up SW visit. We are planning in person visit however need order to include video and phone as things could change. Will update you on the conference after the visit. 1 SW visit every 30 days for 30 days.   Please respond to this message and I can put in Epic.  Thank you.

## 2021-06-08 NOTE — TELEPHONE ENCOUNTER
Requesting verbal okay for follow-up home PT visits 2w3 to work on balance/coordination, transfers, gait, and strengthening. Please respond to this message. Thanks.    Sincerely, Idalmis Agustin PT

## 2021-06-09 NOTE — TELEPHONE ENCOUNTER
Faxed hospice consult/aval, last 2 OV notes, and facesheet.    No current H&P on file.    Asked them to fax me if they need anything further. Fax: [phone]    Priscilla CORTEZ LPN .......... 11:16 AM  06/30/20  [facility]

## 2021-06-09 NOTE — TELEPHONE ENCOUNTER
Ok for another homecare order [place]?     (was discharged from [facility] on 6/12 due to goals being met)     If so-do you want skilled nursing? PT? OT? HHA?

## 2021-06-09 NOTE — PROGRESS NOTES
Patient and his daughter are traveling back to the [facility] [place]. Unable to complete assessment today due to poor phone coverage. Scheduled assessment for tomorrow at 3 pm.

## 2021-06-09 NOTE — TELEPHONE ENCOUNTER
Refill Request  Did you contact pharmacy: No  Medication name:   Requested Prescriptions     Pending Prescriptions Disp Refills     benzonatate (TESSALON PERLES) 100 MG capsule 30 capsule 0     Sig: Take 1 capsule (100 mg total) by mouth every 6 (six) hours as needed for cough.     Who prescribed the medication: Regino Gan MD  Requested Pharmacy: [id number]  Is patient out of medication: Yes  Patient notified refills processed in 3 business days:  yes  Okay to leave a detailed message: no

## 2021-06-09 NOTE — TELEPHONE ENCOUNTER
Daughter called back to discuss LILO status. States over the weekend he was placed on Hospice and put in a Care center but is not end of life stages. Daughter is going to discuss with Hospice about Generator change out and ICD therapies now that he is on Hospice. Daughter feels he should have Gen Change and would like to keep defibrillator active for now. She wants to talk to Hospice and Dr. Blake to get opinions on the ICD with hospice. But states I do not see why we shouldn't change out the battery, she will discuss with family/Hospice and call us back with decisions.     Kaylee Kat, RN

## 2021-06-09 NOTE — TELEPHONE ENCOUNTER
Refill Approved    Rx renewed per Medication Renewal Policy. Medication was last renewed on 10/28/19.    Haley Nichols, [facility] Triage/Med Refill 7/13/2020     Requested Prescriptions   Pending Prescriptions Disp Refills     atorvastatin (LIPITOR) 20 MG tablet [Pharmacy Med Name: ATORVASTATIN 20 MG TABLET] 90 tablet 2     Sig: TAKE 1 TABLET BY MOUTH EVERY DAY       Statins Refill Protocol (Hmg CoA Reductase Inhibitors) Passed - 7/12/2020  9:13 AM        Passed - PCP or prescribing provider visit in past 12 months      Last office visit with prescriber/PCP: 12/30/2019 Regino Gan MD OR same dept: 12/30/2019 Regino Gan MD OR same specialty: 12/30/2019 Regino Gan MD  Last physical: 9/6/2019 Last MTM visit: Visit date not found   Next visit within 3 mo: Visit date not found  Next physical within 3 mo: Visit date not found  Prescriber OR PCP: Regino Gan MD  Last diagnosis associated with med order: 1. Hyperlipemia  - atorvastatin (LIPITOR) 20 MG tablet [Pharmacy Med Name: ATORVASTATIN 20 MG TABLET]; TAKE 1 TABLET BY MOUTH EVERY DAY  Dispense: 90 tablet; Refill: 2    If protocol passes may refill for 12 months if within 3 months of last provider visit (or a total of 15 months).

## 2021-06-09 NOTE — PROGRESS NOTES
"Levy Alba is a 80 y.o. male who is being evaluated via a billable telephone visit.      The patient has been notified of following:     \"This telephone visit will be conducted via a call between you and your physician/provider. We have found that certain health care needs can be provided without the need for a physical exam.  This service lets us provide the care you need with a short phone conversation.  If a prescription is necessary we can send it directly to your pharmacy.  If lab work is needed we can place an order for that and you can then stop by our lab to have the test done at a later time.    Telephone visits are billed at different rates depending on your insurance coverage. During this emergency period, for some insurers they may be billed the same as an in-person visit.  Please reach out to your insurance provider with any questions.    If during the course of the call the physician/provider feels a telephone visit is not appropriate, you will not be charged for this service.\"    Patient has given verbal consent to a Telephone visit? Yes    What phone number would you like to be contacted at? [phone]    Patient would like to receive their AVS by AVS Preference: Mail a copy.    Additional provider notes: Diarrhea earlier this morning better now.  Previously was on an antibiotic for 5 days.  Cough syrup for cough medication may have helped.    Previous treatment includes out of treatment for urinary tract infection.  The patient is primary caregiver his wife has cognitive decline.  I spoke with the patient's daughter.  The diarrhea is better he is weaker she was gone for 2-1/2 days before she left she was doing well upon her return he has been doing as well.  She said he looks like he belongs in hospital or nursing home.    No blood in stool or urine medication list reviewed reconciled.    Assessment/Plan:  Failure to thrive and diarrhea improved may be related to antibiotic therapy " probiotics would be good and offered Lomotil for antispasmodic effect diphenoxylate patient's daughter says not necessary now as his diarrhea is better.    Situational stress for patient his wife who has cognitive decline and rest of family expressed my empathy to the patient's daughter and offered my services anytime we can help.  Ultimately the patient may be a nursing home patient.      Phone call duration:  11 minutes    Belkys Paul, [facility]

## 2021-06-09 NOTE — TELEPHONE ENCOUNTER
Type: routine remote ICD transmission, and alert for device at LILO.  Presenting rhythm: normal sinus and AP-VS, rate 69 bpm, with frequent PACs.  Battery/lead status: stable lead measurements, battery reached LILO 6/13/20.  Arrhythmias: since 5/6/20, total AT/AF 37 seconds, no EGM. Seven SVT, duration up to 29 minutes, rates 150's-190's, therapy appropriately withheld. One episode in VT zone 6/1/20 treated with ATPx1; EGM suggests PSVT, ATP terminated the episode. Two NSVT also noted.  Comments: device at LILO, patient has been away from monitor so did not get transmission at the time. Routed to device RN.   Device/lead alerts: none. prd    Transmission reviewed, agree with above. Device at LILO since 6/13/20 (was away from monitor). Will complete LILO checklist and forward to EP MD. Episodes of SVT noted, and one possible SVT episode reaching FVT terminated by ATP (due to poor Wavelet match scores). Difficult to discern rhythm, abrupt onset/termination in both A/V.     Left VM for patient to discuss LILO status and check for any symptoms of tachycardias.     Will review with Dr. Archibald (in Dr. Blake's absence). LILO checklist to follow.  Kaylee Kat RN

## 2021-06-09 NOTE — TELEPHONE ENCOUNTER
Spoke with the patient's wife, Sherley Perea, and relayed message below from Dr. Gan.  Patient's wife verbalized understanding but still has additional questions for Dr. Gan.  States that the patient started vomiting today so she just has him eating toast and drinking 7 up.  Still has questions regarding him restarting his vitamins.  Helped her to schedule an appointment for Monday to discuss further with Dr. Gan via phone visit.  Sherley Perea had no further questions at this time.  Emma URBANO CMA/SAMIA....................3:43 PM

## 2021-06-09 NOTE — TELEPHONE ENCOUNTER
Order faxed to [phone]    Tried calling daughter Aneta- she could not hear me however.    If she calls back please let her know that the order was faxed.     ** Also please get the name of the hospice company- it was not included in the message taken earlier**

## 2021-06-09 NOTE — TELEPHONE ENCOUNTER
Left message on machine.  Okay for patient to be taken out of the facility to the care of his family during this end of life period.

## 2021-06-09 NOTE — TELEPHONE ENCOUNTER
PC with patient's daughter regarding device function at end of life. Discussed how he wanted to come into the clinic to have the ICD turned off we would need an order from the hospice provider. It sounds like they have already had conversations with him about having a magnet at the end of life.    She will let us know if she needs anything else. Will make the patient inactive in [employer].     Vanna Velasco RN BSN  [facility]

## 2021-06-09 NOTE — TELEPHONE ENCOUNTER
Patient's daughter picked up her father yesterday and took him to the [facility] with her. The situation at home has gotten hostile and since she has POA she made the decision with agreement from patient [place].  Daughter is looking for skilled nursing placement at this time. Enrolling patient in [facility] to assist with this.  She has called about his pacemaker so that cardiology can monitor it remotely and they are working on that  Patient is happy and daughter states he looks so much better in just the day he has been up there with her.  Daughter would like an order placed for home care so she can find an agency up by them until they are able to get him placement.     Please have clinical staff call patient's daughter when this has been done.

## 2021-06-09 NOTE — TELEPHONE ENCOUNTER
RN cannot approve Refill Request    RN can NOT refill this medication med is not covered by policy/route to provider     . Last office visit: 12/30/2019 Regino Gan MD Last Physical: 9/6/2019 Last MTM visit: Visit date not found Last visit same specialty: 12/30/2019 Regino Gan MD.  Next visit within 3 mo: Visit date not found  Next physical within 3 mo: Visit date not found      Haley Nichols, [facility] Triage/Med Refill 6/29/2020    Requested Prescriptions   Pending Prescriptions Disp Refills     benzonatate (TESSALON PERLES) 100 MG capsule 30 capsule 0     Sig: Take 1 capsule (100 mg total) by mouth every 6 (six) hours as needed for cough.       There is no refill protocol information for this order

## 2021-06-09 NOTE — TELEPHONE ENCOUNTER
Yes the patient can take the benzoin 8 pills for cough for this patient.  I am glad he finished the levofloxacin.  Patient should push fluids rest and use arthritis strength acetaminophen 2 tablets 3 times a day on a scheduled basis for the next week he may wish to call in for a telephone visit or virtual video visit with me in 7 days time.

## 2021-06-09 NOTE — TELEPHONE ENCOUNTER
Orders for homecare placed    Aneta notified- she does not seem to know how this will work or who is in her area    She is currently in [place], [place]- advised I do not know if this will be within the territory for [facility]    She states her dad has a pressure ulcer on his buttocks, which is not new. Strongly advised he have it looked at by local ER or urgent care as he is diabetic. She refuses to do so at this time, went over signs/symptoms of infection and possible complications of not getting treatment.     Will call her tomorrow to check on the status of referral and how patient is doing.

## 2021-06-09 NOTE — PROGRESS NOTES
H&P  Teach  I Order X P Order X Letter    COVID  Anticoag n/a Meds  Hold metformin     1940  Home:[phone] (home) Cell:[phone] (mobile)  Emergency Contact: Aneta Kamara [phone]    +++Important patient information for CSC/Cath Lab staff : FYI Hospice pt+++    [facility] EP Cath Lab Procedure Order     Device Implant/Revision:  Procedure: Generator Change Out  Device Type: Dual ICD  Device Company/Device Rep Needed for Procedure: Medtronic    Diagnosis:  Generator Change- Device at LILO  Anticipated Case Duration:  Standard  Scheduling Needs/Timeframe:  Per Dr. Blake- pt would like to schedule sometime in August.  FYI he is on hospice- talk with daughter who is POA to schedule  Pre-Procedural Testing needed: COVID 19 nasal/lab test within 48hrs of procedure  Anesthesia:  Conscious Sedation  Research Protocol:  No    [facility] EP Cath Lab Prep   Ordering Provider: Dr Blake  Ordering Date: 7/16/2020  Orders Status: Intial order placed and Order set placed    H&P:  Pt to schedule with PMD to complete  PCP: Regino Gan MD, [phone]    Pre-op Labs: Ordered AM of procedure    Medical Records Pertinent for Procedure:  Device Implant Record implanted 3/19/13 medtronic dual ICD    Patient Education:    Teach with Patient: Will be completed via phone prior to procedure, and letter was also sent to pt via mail/[employer] with written pre-procedural instructions.    Risks Reviewed:        Internal Cardiac Defibrillator generator change    <1% for each of the following: infection, bleeding, hematoma,   pneumothorax, subclavian vein thrombosis, cardiac perforation, cardiac tamponade, arrhythmias, pectoral or diaphragmatic stimulation, air embolus, pocket erosion.    <4 % lead dislodgment; <1% lead fracture or generator malfunction.    <0.5% CVA or MI.     <0.1% death.    If external defibrillation is needed, 25% risk for superficial burn.    <5% risk of ICD system revision.    >95% VT/VF success implant  rate.    Risks associated with general anesthesia will be addressed by the Anesthesiology Department.    For patients on anticoagulation, the risk of bleeding, hematoma, tamponade, and stroke with partial withdrawal are increased.    Patient education also completed on ALEXA, spurious shocks, driving limitation, and psychological and social aspects of having an ICD.      Pre-Procedure Instructions that were Reviewed with Patient:  NPO after midnight, Remove all jewelry prior to coming in for procedure, Shower prior to arrival, Notified patient of time and date of procedure by CV , Transportation arrangements needed s/p procedure, Post-procedure follow up process, Sedation plan/orders and Pre-procedure letter was sent to pt by CV     Pre-Procedure Medication Instructions:  Instructions given to pt regarding anticoagulants: Pt is not on an anticoagulant- N/A  Instructions given to pt regarding antiarrhythmic medication: N/A for this type of procedure; N/A  Instructions for medication, other than anticoagulants/antiarrhythmics listed above, given to pt: to hold metformin the morning of procedure, and to take remaining medications with small sips of water  Allergies: Reviewed allergies, no concerns regarding orders for procedure    Allergies   Allergen Reactions     Adhesive Tape-Silicones Other (See Comments)     Skin tears with removal of bandages     Amiodarone      neuropathy     Codeine Nausea And Vomiting     Lisinopril Cough     Niacin Unknown     Penicillins Hives     Vicodin [Hydrocodone-Acetaminophen]        Current Outpatient Medications:      acetaminophen (TYLENOL) 500 MG tablet, Take 1 tablet (500 mg total) by mouth every 6 (six) hours as needed., Disp: , Rfl: 0     ascorbic acid, vitamin C, (VITAMIN C) 500 MG tablet, Take 500 mg by mouth daily with lunch., Disp: , Rfl:      aspirin 81 mg chewable tablet, Chew 81 mg daily with lunch.    , Disp: , Rfl:      atorvastatin (LIPITOR) 20 MG  tablet, TAKE 1 TABLET BY MOUTH EVERY DAY, Disp: 90 tablet, Rfl: 3     benzonatate (TESSALON PERLES) 100 MG capsule, Take 1 capsule (100 mg total) by mouth every 6 (six) hours as needed for cough., Disp: 30 capsule, Rfl: 0     cholecalciferol, vitamin D3, 1,000 unit tablet, Take 1,000 Units by mouth daily with lunch.    , Disp: , Rfl:      cyanocobalamin 500 MCG tablet, Take 500 mcg by mouth daily with lunch., Disp: , Rfl:      DIPHENHYDRAMINE HCL ORAL, Take 1 tablet by mouth at bedtime. Caregiver unsure if taking 25 mg or 50 mg, Disp: , Rfl:      loperamide HCl (IMODIUM A-D ORAL), Take 1 tablet by mouth as needed (diarrhea). Indications: diarrhea, Disp: , Rfl:      metFORMIN (GLUCOPHAGE) 1000 MG tablet, Take 1 tablet (1,000 mg total) by mouth 2 (two) times a day., Disp: 180 tablet, Rfl: 2     metoprolol tartrate (LOPRESSOR) 50 MG tablet, Take 1 tablet (50 mg total) by mouth at bedtime., Disp: 90 tablet, Rfl: 1     multivitamin therapeutic tablet, Take 1 tablet by mouth daily with lunch.    , Disp: , Rfl:      nystatin (MYCOSTATIN) powder, Apply to affected area 3 times daily, Disp: 60 g, Rfl: 3     omega-3 fatty acids-vitamin E (FISH OIL) 1,000 mg cap, Take 1 capsule by mouth daily with lunch.    , Disp: , Rfl:      omeprazole (PRILOSEC) 20 MG capsule, Take one by mouth once daily, Disp: 90 capsule, Rfl: 3     psyllium husk, with sugar, (METAMUCIL, WITH SUGAR,) 3.4 gram PwPk, Take 1 Scoop by mouth Daily at 5 pm. Indications: irritable colon, Disp: , Rfl:      pyridoxine, vitamin B6, (VITAMIN B-6) 100 MG tablet, Take 100 mg by mouth daily with lunch.    , Disp: , Rfl:     Documentation Date:7/16/2020 8:59 AM  Domitila Ulloa RN

## 2021-06-09 NOTE — PROGRESS NOTES
Clinic Care Coordination Contact  Community Health Worker Initial Outreach    CHW Initial Information Gathering:  Referral Source: Care Team  Preferred Hospital: [facility]  [phone]  Current living arrangement:: I live in a private home  Supplies used at home:: Incontinence Supplies  Informal Support system:: Children  Transportation means:: Family    Patient accepts CC: Yes     RN Assessment 6/30/2020

## 2021-06-09 NOTE — TELEPHONE ENCOUNTER
Who is calling:  Patient daughter Aneta Kamara  Reason for Call:  Caller requested a call from Dr.Bozivich Lacy Nurse to discuss about patients Hospice care and also medication caller refused to give further information to writer .  Date of last appointment with primary care: 0622/20  Okay to leave a detailed message: No

## 2021-06-09 NOTE — TELEPHONE ENCOUNTER
Who is calling:  Aneat (Daughter)  Reason for Call:  Levy has about two weeks to live and they are wanting to take him out f the facility that he is currently at but was told that they need to contact you, She would greatly appreciate a call today  Date of last appointment with primary care: NA  Okay to leave a detailed message: Yes with a direct number to call the doctor back not the number to get [facility].

## 2021-06-09 NOTE — TELEPHONE ENCOUNTER
Orders being requested:     Hospice orders    Daughter states if hospice is ordered her dad he will qualify for more services.  He will be moving into a care center and they suggested caller have PCP place an order for services.      Reason service is needed/diagnosis:   Recurrent falls  - Primary  R29.6     Gait disturbance  R26.9     General weakness  R53.1         When are orders needed by:   ASAP    Where to send Orders: Daughter will call fax number.     Okay to leave detailed message?  Yes

## 2021-06-09 NOTE — TELEPHONE ENCOUNTER
Who is calling:  Daughter  Reason for Call:  Needs faxed please ASAP  Any recent H&P, visit notes and the signed hospice papers sent to [facility] attn: [place] to: [phone]. The facility does have an opening for the patient to move in needs these sent today ASAP please.    Also the daughter says [facility]  Date of last appointment with primary care: 6/22/20  Okay to leave a detailed message: Yes

## 2021-06-09 NOTE — TELEPHONE ENCOUNTER
Per scheduling, patients daughter was contacted to schedule ICD generator replacement.  She states that he has been taken out of the care facility for his last few remaining days to come home and pass in peace.  She does not think that he should have the generator change any longer and has been told that he has about 2 weeks left to live.      Order for generator change removed.  Will alert Device team to contact daughter to discuss end of life concerns with an ICD.

## 2021-06-09 NOTE — PROGRESS NOTES
Spoke with patient's daughter Aneta today. Aneta said her father is currently staying at their family cabin in [place], [place]. Aneta said she hopes to transfer him to LTC facility in the area related to increasing care needs. Aneta requested assistance with finding home care in the area to assist with cares while he staying at the cabin. Writer provided Aneta with a list of home care agencies in the area of the cabin. Aneta reported her [facility] Hospice was discussed. Family will be getting together this weekend to discuss options with patient, but it appears patient will be going to a LTC when bed becomes available.

## 2021-06-09 NOTE — TELEPHONE ENCOUNTER
Who is calling:  Wife     Reason for Call: [place] care [place] . Wife states that she called Dr Gan about the  cough and this made daughter upset.  Wife states she told daughter to shut and sit down.   Wife wants help as she is being told she has not say in her 's care.  She is open to have home care.   Date of last appointment with primary care: NA  Okay to leave a detailed message: No

## 2021-06-09 NOTE — TELEPHONE ENCOUNTER
RN Triage  Levy's wife, calling today to update Dr. Gan as she was advised during virtual visit 6/15. She states that Levy is still feeling unwell. Coughing at night. Still has benzonatate for cough- Sherley Perea wants to know if Levy can continue taking the benzonatate and when he can resume his daily  vitamins- he was advised to stop taking the vitamins for now. He has finished the levaquin.    Sherley Perea is not interested in any further triage for Levy's symptoms at this time. Would like question sent to Dr. Gan.    Please review and advise.    Luh Salazar RN  [facility] Nurse Advisor    Reason for Disposition    Caller has NON-URGENT medication question about med that PCP prescribed and triager unable to answer question    Protocols used: MEDICATION QUESTION CALL-A-[place]    COVID 19 Nurse Triage Plan/Patient Instructions    Please be aware that novel coronavirus (COVID-19) may be circulating in the community. If you develop symptoms such as fever, cough, or SOB or if you have concerns about the presence of another infection including coronavirus (COVID-19), please contact your health care provider or visit [email or web address].     Disposition/Instructions    Patient to stay at home and follow home care protocol based instructions.    Thank you for taking steps to prevent the spread of this virus.  o Limit your contact with others.  o Wear a simple mask to cover your cough.  o Wash your hands well and often.    Resources    [facility]: About COVID-19: [email or web address]    CDC: What to Do If You're Sick: www.cdc.gov/coronavirus/2019-ncov/about/steps-when-sick.html    CDC: Ending Home Isolation: www.cdc.gov/coronavirus/2019-ncov/hcp/disposition-in-home-patients.html     CDC: Caring for Someone: www.cdc.gov/coronavirus/2019-ncov/if-you-are-sick/care-for-someone.html     [facility]: Interim Guidance for Hospital Discharge to Home:  www.health.[facility]/diseases/coronavirus/hcp/hospdischarge.pdf    [facility] clinical trials (COVID-19 research studies): clinicalaffairs.[facility]/umn-clinical-trials     Below are the COVID-19 hotlines at the [facility] ([facility]). Interpreters are available.   o For health questions: Call 568-700-2241 or 1-345.305.1337 (7 a.m. to 7 p.m.)  o For questions about schools and childcare: Call 537-885-5611 or 1-632.552.7468 (7 a.m. to 7 p.m.)

## 2021-06-09 NOTE — PROGRESS NOTES
Heart Care Device Change-Out Checklist (LILO Checklist)    Device Data  ICD and Dual    :  Medtronic   Model:  [id number] Protecta   Serial Number:  [id number]  Implant location: Left Chest    Implant Date: 3/19/2013  LILO Date:  6/13/2020  Device Diagnosis:  Ischemic Cardiomyopathy; Secondary Prevention    Device Alert(s):  No    Lead Data  (Print Device History and attach to this document. Enter each lead  and model number.)  Last Interrogation Date: 12/23/2019      Atrium: Medtronic 5076 CapSureFix Novus   Lead Imp:  475Ohms, Pacing Threshold:  0.5V @ 0.4 ms  and Sensing Threshold:  4 mV      Right Ventricle: Medtronic 6947M Sprint Quattro Secure   Lead Imp:  418Ohms, Pacing Threshold:  0.75V @ 0.4 ms and Sensing Threshold:  7 mV   Shock Imp: 48/62 Ohms       Old Leads Present/Abandoned: No    Lead Alert(s):  No    Diagnostic Information  Intrinsic Rhythm:  Sinus Bradycardia, rate in the 50s bpm    Atrial Fibrillation:  N/A  Takes Anticoagulant? No    Pacing Percentages  Atrial Pacing 50% and Ventricle Pacing <1%  Pacemaker Dependent? No    History of VT/VF therapy    ATP: Yes  Appropriate Shocks:  N/A    Ejection Fraction  Last EF Date:  11/22/2017    By Echocardiogram  Last EF Measurement:  35-40%      Special Instructions/Timeframe for change-out:  Device went to LILO 6/13/2020, change out by 9/13/2020    Routed to EP:  Yes: Dr. Jozef Archibald (in Dr. Blake's absence)      Device RN: Kaylee Kat RN

## 2021-06-09 NOTE — TELEPHONE ENCOUNTER
"Called and spoke with Aneta-they are trying to get him accepted into [facility] in [place]- in [facility]- they have appt tomorrow in [place] for this.     She will call back with an update and if the hospice order needs to be sent there.     The order that was faxed on 6/26 was not accepted per Aneta \"he has too many medical issues\"       "

## 2021-06-09 NOTE — TELEPHONE ENCOUNTER
"  Call placed to daughter, Aneta,  to check status of possible change out. Daughter states she \"totally forgot to check with hospice\" has been busy trying to get him into a nursing home. States she has meeting tomorrow with hospice and will ask them about generator change out but from her standpoint she wants it changed out. She would like to run this by Dr. Blake as well. Will forward on.    Kaylee Kat RN    " yes...

## 2021-06-09 NOTE — TELEPHONE ENCOUNTER
Who is calling:  Patient's daughterAsad  Reason for Call:    Calling back with fax number for Hospice Referral.  Fax# [phone]  Date of last appointment with primary care: 6/22/2020  Okay to leave a detailed message: Yes

## 2021-06-10 NOTE — PROGRESS NOTES
Received a call from the patient's daughter Aneta requesting to receive a magnet in order to disable her father's ICD therapies who is currently in hospice care and expected survival less than 24 hours.  She confirmed that she previously discussed this plan with Dr. Blake.   I was able to find a magnet and provided to her family after discussion via phone with Dr. Blake.

## 2021-06-10 NOTE — TELEPHONE ENCOUNTER
Call received from patients wife asking about when next transmission. Per chart review patient's wife has dementia. Called placed to daughter Aneta to review what is going on.    Kaylee Kat RN

## 2021-06-10 NOTE — TELEPHONE ENCOUNTER
Sherley Perea, Levy's wife calls in upset that Levy is not being fed and she sort of understands he is in end of life care but she doesn't.  She wants to give him food or water or something and they all , caregivers and family, tell her no.  She is understandably upset seeing him so frail.  She doesn't quite grasp what is happening. She wants to call Dr. Gan in the morning to discuss his care.  Her children wont let her call.  Just listened and let her vent.     Reason for Disposition    Information only question and nurse able to answer     Nothing to answer, just to listen.  Told her I can't advise on his care.    Protocols used: INFORMATION ONLY CALL - NO TRIAGE-A-OH

## 2021-06-14 NOTE — PROGRESS NOTES
Office Visit - Follow Up   Levy Alba   77 y.o. male    Date of Visit: 12/1/2017    Chief Complaint   Patient presents with     Cough     Cough wakes him up at night, keeps hacking and can't get back to sleep     Diabetes     Not fasting         Assessment and Plan   {1. Chronic cough  We did a chest x-ray today and I personally reviewed the images with Levy and his wife.  This is a normal chest x-ray.  His cough is prominent at night and not much at all during the day.  I suggested that he try a wedge shaped pillow.  This all could be GERD related.  He could use some Robitussin-DM at night also.  - XR Chest 2 Views    2. DM2 (diabetes mellitus, type 2)  Has had good control of his diabetes but no A1c is in the past year.  - Glycosylated Hemoglobin A1c   BPH..3.  We will double his Flomax to 0.8 mg at night.  He may need urology evaluation thereafter if not improving.        No Follow-up on file.  Encouraged him to re-see me in at least 6 months.      History of Present Illness   This 77 y.o. old for office follow-up comes in after more than a year.  He has several issues to discuss today.  He reports that he coughed prominently at night his wife gets awakened by his coughing.  He has little to no coughing during the day.  He reports no heartburn-like symptoms.  He states he does not eat much in the way food in the evening.  He denies burning in his epigastrium.  He reports no food sticking he eats a relatively early supper.  He has no fevers or chills.  The cough is dry and nonproductive.  He like a chest x-ray.    He complains that he is up urinating 2-6 times per night and 1-2 hours frequently throughout the day.  He is on Flomax 0.4 mg per day he to get this improved.  Denies any burning with urination.    He has been followed by cardiology for his heart disease.  He recently has had a cholesterol level.  He is overdue for a diabetes test.      His wife and his family complains that he is hard of  "hearing.  We discussed this today.  He does not want to get a hearing aid.  He does does not want to get tested for his hearing.  I suggested referral as needed for audiology testing when Levy desires.        Review of Systems: A comprehensive review of systems was negative except as noted.     Medications, Allergies and Problem List   Reviewed and updated     Physical Exam   General Appearance:       /60 (Patient Site: Left Arm, Patient Position: Sitting, Cuff Size: Adult Large)  Pulse 74  Ht 5' 9\" (1.753 m)  Wt 175 lb (79.4 kg)  SpO2 100%  BMI 25.84 kg/m2    His lungs sound entirely clear.  Heart rhythm is regular.  No murmurs.  No edema.  Abdomen is soft and nontender.  No epigastric tenderness.     Additional Information   Current Outpatient Prescriptions   Medication Sig Dispense Refill     aspirin 81 mg chewable tablet Chew 81 mg daily.       atorvastatin (LIPITOR) 20 MG tablet TAKE 1 TABLET (20 MG) BY ORAL ROUTE ONCE DAILY 90 tablet 3     CALCIUM CARBONATE/VITAMIN D3 (CALCIUM 600 + D,3, ORAL) Take by mouth.       cholecalciferol, vitamin D3, 1,000 unit tablet Take 1,000 Units by mouth 2 (two) times a day.        CONTOUR TEST STRIPS strips TEST 2 TIMES DAILY AS DIRECTED(E119) 200 strip 1     glimepiride (AMARYL) 4 MG tablet Take 1 tablet (4 mg total) by mouth 2 (two) times a day. TAKE 1 TABLET BY MOUTH 3 TIMES A DAY. (Patient taking differently: TAKE 1 TABLET BY MOUTH 3 TIMES A DAY.) 180 tablet 3     losartan (COZAAR) 25 MG tablet Take 1 tablet (25 mg total) by mouth daily. No further refills until seen by Dr Morton;call 24/7 to set up 90 tablet 0     metFORMIN (GLUCOPHAGE) 1000 MG tablet TAKE ONE TABLET BY MOUTH TWICE DAILY 180 tablet 4     metoprolol tartrate (LOPRESSOR) 50 MG tablet TAKE 1 TABLET ONCE DAILY AT BEDTIME, STOP ATENOLOL 90 tablet 3     MULTIVITS,TH W-FE,OTHER MIN (COMPLETE MULTIVITAMIN ORAL) Take 1 tablet by mouth daily.       nitroglycerin (NITROSTAT) 0.4 MG SL tablet Place 1 " tablet (0.4 mg total) under the tongue every 5 (five) minutes as needed for chest pain. 90 tablet 1     omega-3 fatty acids-vitamin E (FISH OIL) 1,000 mg cap Take 1 capsule by mouth daily.       tamsulosin (FLOMAX) 0.4 mg Cp24 TAKE 1 CAPSULE BY MOUTH DAILY 90 capsule 4     No current facility-administered medications for this visit.      Allergies   Allergen Reactions     Amiodarone      neuropathy     Atorvastatin Unknown     Lisinopril Cough     Niacin Unknown     Penicillins Unknown     Social History   Substance Use Topics     Smoking status: Former Smoker     Quit date: 1/1/1985     Smokeless tobacco: Never Used     Alcohol use None       Review and/or order of clinical lab tests:  Review and/or order of radiology tests:  Review and/or order of medicine tests:  Discussion of test results with performing physician:  Decision to obtain old records and/or obtain history from someone other than the patient:  Review and summarization of old records and/or obtaining history from someone other than the patient and.or discussion of case with another health care provider:  Independent visualization of image, tracing or specimen itself:    Time: total time spent with the patient was 25 minutes of which >50% was spent in counseling and coordination of care     Flakito Morton MD

## 2021-06-14 NOTE — PROGRESS NOTES
[facility] Heart Care Note  Assessment:  1. History of clinical ventricular tachycardia. Ventricular tachycardia appears to originate in the inferior left ventricular region, and probably related to previous inferior posterior      LV scar.   2. Dual-chamber ICD implanted 03/19/2013 -- secondary indication:. [employer]   3. Coronary artery disease with remote inferior infarct and bypass surgery.        Stress nuclear scan 10/03/2013 showed fixed inferolateral defect         Echocardiogram November 11 2015 shows fixed inferior posterior regional wall motion abnormality with ejection fraction-unchanged from 2013.   4. Neurologic problems with ataxia, dizziness, resolved upon termination of amiodarone. These effects should be considered amiodarone related.         Amiodarone discontinued       Occasions of dizziness while walking-no evidence for postural hypotension  5. Transient ischemic attack, 03/2014. At that time, carotid ultrasound showed minimal disease and there was no evidence on CT scan of a stroke.  Brief bursts of atrial arrhythmia lasting less than 1 minute      Plan:  Blood Work today will include a comprehensive metabolic profile and lipid panel  ICD/Device check today is excellent.  We expect your battery to last another 27 months  Continue to have device check through transtelephonic monitoring every 3 months  Call Sherley Young RN  [phone]  about your medication so that we can update her list to make sure we are accurate  Follow-up with Dr. Ilia Nielsen in 6 months  Return in 1 year for comprehensive cardiac arrhythmia device assessment        Subjective:    I had the opportunity to see.Levy Alba , who is a 77 y.o. male with a known history of Ischemic cardiomyopathy and ventricular tachycardia  Is done quite well from heart standpoint  No angina  Good endurance good exercise capacity, does his day-to-day activities without limitations  No excessive breathlessness; no orthopnea PND or pedal  edema  No awareness of palpitations no syncopal or near syncopal episodes  He has had some skin cancers and has had surgery on his face and left hand  He has not been seen by physicians for some time  He was advised to follow-up with Dr. Flakito Morton his primary physician    He denies any recent neurologic events: So many of his problems such as ataxia dizziness seemed related to amiodarone that has now been stopped for some time    Problem List:  Patient Active Problem List   Diagnosis     Chest Pain     Postherpetic Neuralgia     Sebaceous Cyst     Influenza     Vasovagal Syncope     Acute Myocardial Infarction     Paroxysmal Ventricular Tachycardia     Dizziness     Type 2 diabetes mellitus with diabetic neuropathy     Hyperlipidemia     Transient Ischemic Attack     Lumbar Radiculopathy     CAD (coronary artery disease)     ICD (implantable cardioverter-defibrillator), dual, in situ     HTN (hypertension)     Medical History:  No past medical history on file.  Surgical History:  No past surgical history on file.  Social History:  Social History     Social History     Marital status:      Spouse name: N/A     Number of children: N/A     Years of education: N/A     Occupational History     Not on file.     Social History Main Topics     Smoking status: Former Smoker     Quit date: 1/1/1985     Smokeless tobacco: Never Used     Alcohol use Not on file     Drug use: Not on file     Sexual activity: Not on file     Other Topics Concern     Not on file     Social History Narrative       Review of Systems:      General: WNL  Eyes: WNL  Ears/Nose/Throat: WNL  Lungs: WNL  Heart: WNL  Stomach: WNL  Bladder: WNL  Muscle/Joints: WNL  Skin: WNL  Nervous System: WNL  Mental Health: WNL     Blood: WNL        Family History:  No family history on file.      Allergies:  Allergies   Allergen Reactions     Amiodarone      neuropathy     Atorvastatin Unknown     Lisinopril Cough     Niacin Unknown     Penicillins  Unknown       Medications:  Current Outpatient Prescriptions   Medication Sig Dispense Refill     ascorbic acid (ASCORBIC ACID WITH JESSICA HIPS) 500 MG tablet Take 500 mg by mouth daily.       aspirin 81 mg chewable tablet Chew 81 mg daily.       atorvastatin (LIPITOR) 20 MG tablet TAKE 1 TABLET (20 MG) BY ORAL ROUTE ONCE DAILY 90 tablet 3     CALCIUM CARBONATE/VITAMIN D3 (CALCIUM 600 + D,3, ORAL) Take by mouth.       cholecalciferol, vitamin D3, 1,000 unit tablet Take 1,000 Units by mouth 2 (two) times a day.        CONTOUR TEST STRIPS strips TEST 2 TIMES DAILY AS DIRECTED(E119) 200 strip 1     glimepiride (AMARYL) 4 MG tablet Take 1 tablet (4 mg total) by mouth 2 (two) times a day. TAKE 1 TABLET BY MOUTH 3 TIMES A DAY. (Patient taking differently: TAKE 1 TABLET BY MOUTH 3 TIMES A DAY.) 180 tablet 3     losartan (COZAAR) 25 MG tablet Take 1 tablet (25 mg total) by mouth daily. No further refills until seen by Dr Morton;call 24/7 to set up 90 tablet 0     metFORMIN (GLUCOPHAGE) 1000 MG tablet TAKE ONE TABLET BY MOUTH TWICE DAILY 180 tablet 4     metoprolol tartrate (LOPRESSOR) 50 MG tablet TAKE 1 TABLET ONCE DAILY AT BEDTIME, STOP ATENOLOL 90 tablet 3     MULTIVITS,TH W-FE,OTHER MIN (COMPLETE MULTIVITAMIN ORAL) Take 1 tablet by mouth daily.       nitroglycerin (NITROSTAT) 0.4 MG SL tablet Place 1 tablet (0.4 mg total) under the tongue every 5 (five) minutes as needed for chest pain. 90 tablet 1     omega-3 fatty acids-vitamin E (FISH OIL) 1,000 mg cap Take 1 capsule by mouth daily.       tamsulosin (FLOMAX) 0.4 mg Cp24 TAKE 1 CAPSULE BY MOUTH DAILY 90 capsule 4     No current facility-administered medications for this visit.          Surgical site  ICD is well-healed to left subclavicular site    Device interrogation  Intrinsic rhythm is normal sinus rhythm with intact AV conduction  75% atrial pacing almost no ventricular pacing  A few short bursts of atrial arrhythmia none lasting more than a minute  Several  "short bursts of nonsustained ventricular tachycardia all self terminating, no therapy has been delivered  Lead functionality is satisfactory  Battery voltage good for another 27 months    Objective:   Vital signs:  /66 (Patient Site: Left Arm, Patient Position: Sitting, Cuff Size: Adult Regular)  Pulse 60  Resp 16  Ht 5' 9\" (1.753 m)  Wt 173 lb (78.5 kg)  BMI 25.55 kg/m2      Physical Exam:      GENERAL APPEARANCE: Alert, cooperative and in no acute distress.  HEENT: No scleral icterus. No Xanthelasma. Oral mucous membranes pink and moist.  NECK: JVP  Normal cm. No Hepatojugular reflux. Thyroid not  Palpable  CHEST: clear to auscultation and percussion  CARDIOVASCULAR: S1, S2 without murmur    Brachial, radial  pulses are intact and symmetric.   No carotid bruits noted.  ABDOMEN: Non tender. BS+. No bruits.  EXTREMITIES: No cyanosis, clubbing or edema.    Lab Results:  LIPIDS:  Lab Results   Component Value Date    CHOL 123 05/15/2015    CHOL 122 03/12/2014    CHOL 136 10/03/2013     Lab Results   Component Value Date    HDL 39 (L) 05/15/2015    HDL 33 (L) 03/12/2014    HDL 39 (L) 10/03/2013     Lab Results   Component Value Date    LDLCALC 42 05/15/2015    LDLCALC 56 03/12/2014    LDLCALC 68 10/03/2013     Lab Results   Component Value Date    TRIG 209 (H) 05/15/2015    TRIG 166 (H) 03/12/2014    TRIG 148 10/03/2013     No components found for: CHOLHDL    BMP:  Lab Results   Component Value Date    CREATININE 1.20 10/25/2016    BUN 18 10/25/2016     10/25/2016    K 4.6 10/25/2016     10/25/2016    CO2 28 10/25/2016         This note has been dictated using voice recognition software. Any grammatical or context distortions are unintentional and inherent to the software.  Saurabh Blake MD  [facility]                    "

## 2021-06-14 NOTE — PROGRESS NOTES
In clinic device check with Device RN and follow-up with Dr. Blake..  Please see link for full device report.  Patient was informed of results and next follow up during today's visit.

## 2021-06-15 NOTE — PROGRESS NOTES
Office Visit - Follow Up   Levy Alba   78 y.o. male    Date of Visit: 1/31/2018    Chief Complaint   Patient presents with     Hospital Visit Follow Up     Feeling better and able to walk, no more dizziness        Assessment and Plan   1. Type 2 diabetes mellitus with diabetic neuropathy, without long-term current use of insulin  Last A1c on 12/1/2017 was 6.8.  We will check this again in about 2 months.    2. Gait disturbance  Was found in the hospital to have findings on CT scan suggestive of NPH.  Refer you to the discharge summary.  Since being back home he thinks his gait is more steady.  He is walking without walker but still exhibits a fairly broad-based gait.  We doing a diagnostic/therapeutic spinal tap on 2/14/18 with follow-up with neurology thereafter.  I told Levy and his wife that he pay very close attention as to whether he thinks his gait improves after the spinal tap.  If he has a good clinical response will consider neurosurgery evaluation for shunt placement          No Follow-up on file.  I have asked him to see me in follow-up again in 1 month at that time we should have a good idea of whether we are proceeding with neurosurgery evaluation for his gait disturbance.     History of Present Illness   This 78 y.o. old was admitted to the hospital on 1/16 through 1/18 2018 with an intractable cough and associated marked gait imbalance.  I treated his cough with steroid inhaler and Flonase and H2 blocker and Tessalon.  On this regimen he did very well and his cough is essentially gone.  In the hospital he was found to be systemically weak but primarily have a marked gait disturbance.  He really hard time walking and remaining with adequate balance.  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  He had a follow-up CT scan 24 hours later to be sure that there was no acute change in these findings.  It was unchanged.  He was discharged to home with  "recommendations from neurology to get a diagnostic and therapeutic spinal tap on 2/14/2018 to see if removing a substantial amount of CSF fluid will improve his.  Levy does report that he thinks he is better balanced now and is walking better.  I had a long discussion with Levy and his wife about his medications.  At this time he is on both omeprazole and Pepcid.  I thought he could stop the Pepcid at this time.  He wondered why he is on metoprolol.  Wonder why he is on tamsulosin why he is on losartan his daughter wants to know all about his.  Drug allergies including amiodarone and lisinopril niacin and penicillin.  I shared with the family and wrote notes to the daughter about his drug allergies about his current medications.  I reviewed each and every medication and we stopped his Pepcid at this time.  I think he needs all of his other medications currently.  His diabetes is under good control with a recent A1c less than 7.    Review of Systems: A comprehensive review of systems was negative except as noted.     Medications, Allergies and Problem List   Reviewed and updated     Physical Exam   General Appearance:       /68 (Patient Site: Left Arm, Patient Position: Sitting, Cuff Size: Adult Regular)  Pulse 63  Ht 5' 10\" (1.778 m)  Wt 172 lb (78 kg)  SpO2 99%  BMI 24.68 kg/m2    His lungs are clear.  O2 sats are good.  He is not coughing right heart rhythm is regular.  Cranial nerves III through XII are intact.  He still walks with a broad-based gait but actually is fairly steady and walking.  He makes an adequate return back to resume walking back to the exam room.  His motor strength in his lower legs is adequate.     Additional Information   Current Outpatient Prescriptions   Medication Sig Dispense Refill     aspirin 81 mg chewable tablet Chew 81 mg daily.       atorvastatin (LIPITOR) 20 MG tablet Take 20 mg by mouth at bedtime.       benzonatate (TESSALON) 100 MG capsule Take 100 mg by mouth 3 " (three) times a day as needed for cough.       cholecalciferol, vitamin D3, 1,000 unit tablet Take 1,000 Units by mouth daily.        CONTOUR TEST STRIPS strips TEST 2 TIMES DAILY AS DIRECTED(E119) 200 strip 1     famotidine (PEPCID) 40 MG tablet Take 1 tablet (40 mg total) by mouth at bedtime as needed for heartburn. 20 tablet 0     fluticasone (FLONASE) 50 mcg/actuation nasal spray 2 sprays into each nostril daily. 16 g 12     fluticasone-salmeterol (ADVAIR) 250-50 mcg/dose DISKUS Inhale 1 puff 2 (two) times a day. 14 each 0     glimepiride (AMARYL) 4 MG tablet Take 4 mg by mouth 2 (two) times a day before meals.       losartan (COZAAR) 25 MG tablet Take 1 tablet (25 mg total) by mouth daily. 90 tablet 2     metFORMIN (GLUCOPHAGE) 1000 MG tablet Take 1,000 mg by mouth 2 (two) times a day with meals.       metoprolol tartrate (LOPRESSOR) 50 MG tablet Take 50 mg by mouth at bedtime.       multivitamin therapeutic tablet Take 1 tablet by mouth daily.       nitroglycerin (NITROSTAT) 0.4 MG SL tablet Place 1 tablet (0.4 mg total) under the tongue every 5 (five) minutes as needed for chest pain. 90 tablet 1     omega-3 fatty acids-vitamin E (FISH OIL) 1,000 mg cap Take 1 capsule by mouth daily.       omeprazole (PRILOSEC) 20 MG capsule Take 1 capsule (20 mg total) by mouth daily before breakfast. 30 capsule 0     pyridoxine, vitamin B6, (VITAMIN B-6) 100 MG tablet Take 100 mg by mouth daily.       tamsulosin (FLOMAX) 0.4 mg Cp24 Take 0.4 mg by mouth daily after supper.       No current facility-administered medications for this visit.      Allergies   Allergen Reactions     Amiodarone      neuropathy     Lisinopril Cough     Niacin Unknown     Penicillins Hives     Social History   Substance Use Topics     Smoking status: Former Smoker     Quit date: 1/1/1985     Smokeless tobacco: Never Used     Alcohol use No       Review and/or order of clinical lab tests:  Review and/or order of radiology tests:  Review and/or order  of medicine tests:  Discussion of test results with performing physician:  Decision to obtain old records and/or obtain history from someone other than the patient:  Review and summarization of old records and/or obtaining history from someone other than the patient and.or discussion of case with another health care provider:  Independent visualization of image, tracing or specimen itself:    Time: total time spent with the patient was 25 minutes of which >50% was spent in counseling and coordination of care     Flakito Morton MD

## 2021-06-16 NOTE — PROGRESS NOTES
Physical Therapy NPH Follow-Up     Levy SUNDEEP Parth   2/15/2018      Patient returns for follow-up appointment after lumbar puncture to asses change in balance, gait, and functional mobility outcomes after CSF drainage, in order to assist in determining if possible shunt placement is indicated in treatment of NPH.     Subjective-    Patient Comments: Pt thought his walking was more balanced yesterday and more steady and he felt like he did not have to carry any extra weight on the right leg.   Today he thought the walking did not feel any different compared to pre LP.  His daughter thought he looked more rested today.  His wife said he did not get up as much during the night, last night    He usually gets up about 4 to 5 times, and last night he  Only got up 2 times.  His wife said he has to go to the bathroom quickly and she had to help him from falling last night.  She said his body is  usually more stiff to get out of bed for the first time up and she has to help him get out of bed. After that he is able to move better.        Objective-    Pain: Pt continues to have shoulder and neck pain after his fall.       Gait Speed:   Gait speed was ..07 meters per second, indicating decreased speed and patient is at risk for falls. .03 m/sec faster but not a minimal detectable change.    Gait Speed Interpretation   >1.2 m/sec Independent in all activities and crossing street   0.8-1.0 m/sec Community ambulator, household activities. May need intervention to reduce falls risk.   0.6-0.8 m/sec Performs self care, limited community ambulator. May need intervention to reduce falls risk.   <0.6 m/sec Dependent in ADLs, household ambulator. Needs intervention to reduce falls risk.    Minimal Detectable Change for preferred gait (PD) = 0.18 meters/sec  Pt did use the walker for testing.    Timed Up & Go (TUG) Balance Assessment: Average 18.73 sec  .94 seconds faster but not a minimal clinical detectable change.     Trial 1 18.78  sec; Trial 2 18.46 sec; Trial 3 17.88 sec       Gait assistive device used: rolling walker   Pt did use the walker with testing.  He does  the walker and turn around.  Sit to stand was quicker.     Time  Interpretation   <10 seconds Freely mobile   >13.5 seconds Indicates increased risk for falling in community dwelling older adults   >20 seconds Indicates impaired functional mobility; may need an assistive device   >30 seconds Indicates dependency in most ADL and mobility skills   The TUG is a test of basic mobility skills.  It is used to screen individuals prone to falls.    Minimal Detectable Change for patients with Alzheimzer s = 4.09 sec    Minimal Detectable Change for patients with Parkinson s Disease = 3.5 sec       Five Times Sit to Stand Test: (FTSST) The FTSTST measures functional LE strength and provides information about postural control during transitions to/from standing.    Patient Score: 21.07  (4.55 sec.  Slower)   Did patient require use of hands to complete? No (If yes, the test is not valid; however, still gives objective measurement of function)              Pt was slower with the test but did not have any LOB today and completed the test without hands.  Yesterday he had a LOB backwards             Into chair and had to use his hands to correct.     Interpretation of Results: > 16.0 seconds indicates risk of falls.    Average score (diagnosis of PD) =9.67 seconds (range = 5.9 - 13.5 sec)    Minimal Detectable Change = 2.5 sec    Minimally Clinical Important Difference = 2.3 sec    Additional Training/Comments: The pt did walk into  the department without AD and he rested before testing.   PT did test with the walker today. Pt was able to sit to stand faster today and he did use his hands. He did ambulate without the AD.  Gait was slightly faster and steps slightly larger.  He does continue to catch the right LE with swing phase and does not have a good heel strike on occ. This does  increase with increased fatigue. .  Steps become smaller with increased fatigue.  Pt leans more to the right and this is unchanged.             Education: The pt's family had a lot of questions.  PT did review the objective results and PT did recommend the pt and family talk with the MD for more answers to his physical condition and about NPH. His wife had concerns about the pt having to get into the bathroom and night and him falling.  PT did recommend a commode by the bed if pt has to use the bathroom quickly at night or he use a urinal. Also, PT recommended they could try a bed railing that might help him be more indep with bed mobility.  Information was provided.  PT also recommended he use the walker for mobility.  PT educated the pt to work on gait quality and he can do standing marching and a heel/toe drill to improve LE strength and coordination.                 Assessment-  Patient demonstrates not significantly changed time to complete TUG, not significantly changed Gait Speed, and increased time in FTSST performance but is more stable with the task. .Pt remains at medium risk of falls. The pt said he felt more steady and more balanced with walking yesterday after the LP.  His wife said he did not have to get up out of bed as much last night to use the bathroom.  His daughter thought he looked more rested today.  The pt's transfers were quicker and more steady.  His gait was slightly faster and steps slightly larger but he did not have any minimal clinical changes on the TUG and Gait speed test today.  PT does recommend he continue to use the walker.  Please note, the pt did fall the day before any of the testing. He did go to the ER per the family.   He did not have a headache but he does have continued neck and shoulder soreness from that fall.           Plan-  Will discharge patient from Physical Therapy. Results will be communicated with referring physician.     Goals Met? Partially. Please note that  if goals are partially met this is related to performance level and short duration of PT intervention during current encounter for testing purposes. This may indicate the need for future PT intervention to reduce falls risk and improve independent mobility.     Treatment Time/Interventions:    Gait training 30 minutes   Neuro Re-ed 15 minutes   Therapy activity 15 minutes.     Peggy Terrazas, PT  2/15/2018

## 2021-06-16 NOTE — PROGRESS NOTES
[facility] Daily Progress     Patient Name: Levy Alba  Date: 2018  Visit #: 18-18  Referral Diagnosis: Gait difficulty  Referring provider: Eduardo Valdez MD  Visit Diagnosis:     ICD-10-CM    1. Unsteadiness on feet R26.81    2. Generalized muscle weakness M62.81    3. Gait disturbance R26.9          Assessment:     Patient is benefitting from skilled physical therapy and is making steady progress toward functional goals.  Patient is appropriate to continue with skilled physical therapy intervention, as indicated by initial plan of care.  Patient feels he is more stable every day and continues to feel stronger.  Will take visit by visit to see if pt will still benefit from PT.  Currently, he will benefit from a HEP for strengthening and will still benefit from some gait training.  Goal Status:  Pt. will be independent with home exercise program in : 12 weeks  Pt will: Able to get in and out of a car easier as strength improves within 12 weeks  Pt will: Able to get out of a chair easier as strength improves within 12 weeks  Pt will: Able to feel more stable and less leaning ambulating on even surfaces within 12 weeks  Pt will: Able to resume going to the [facility] for regular exercise within 12 weeks    Plan / Patient Education:     Progress HEP for strengthening  Balance and proprioception exercises    Subjective:     Pain Ratin    I feel I can do more now.  Yesterday I started feeling better and walking more normal.      Objective:   Barriers to Learning or Achieving Goals:  Co-morbidities or other medical factors.  HTN, diabetes, HX of MI, TIA  Age    Functional limitations are described as occurring with:   getting out of the car, getting out of a chair, unstable as he walks'    Rhomberg: eyes open with nudges:  Pt able to stay stable with nudges side to side and forward and backward  Rhomberg eyes closed: nudges forward, backward and side to side.  Able to stay  stable    Exercises:  Exercise #1: clamshells   work up to 20 reps  Comment #1: bridges  hold 10 seconds X 6-12 reps    Treatment Today     TREATMENT MINUTES COMMENTS   Evaluation     Self-care/ Home management     Manual therapy     Neuromuscular Re-education     Therapeutic Activity     Therapeutic Exercises 25 See above or flow sheet;  Rhomberg tests completed.   Gait training     Modality__________________                Total 25    Blank areas are intentional and mean the treatment did not include these items.       Annamaria Amador, PT  2/26/2018

## 2021-06-16 NOTE — PROGRESS NOTES
[facility] Daily Progress     Patient Name: Levy Alba  Date: 3/12/2018  Visit #: 18-18  PTA #1  Referral Diagnosis: Gait difficulty  Referring provider: Eduardo Valdez MD  Visit Diagnosis:     ICD-10-CM    1. Unsteadiness on feet R26.81    2. Generalized muscle weakness M62.81    3. Gait disturbance R26.9          Assessment:     Patient is benefitting from skilled physical therapy and is making steady progress toward functional goals.  Patient is appropriate to continue with skilled physical therapy intervention, as indicated by initial plan of care.  Patient feels the longer he walks the more off balance he gets.  Has no problems with the exercises.    Goal Status: On going  Pt. will be independent with home exercise program in : 12 weeks  Pt will: Able to get in and out of a car easier as strength improves within 12 weeks  Pt will: Able to get out of a chair easier as strength improves within 12 weeks  Pt will: Able to feel more stable and less leaning ambulating on even surfaces within 12 weeks  Pt will: Able to resume going to the [facility] for regular exercise within 12 weeks    Plan / Patient Education:     Add heel to toe, carioca, hurdles  Go over standing hip exercises.    Has a neurologist work-up in April  Subjective:     Pain Ratin    I still drag my right foot    Objective:   Barriers to Learning or Achieving Goals:  Co-morbidities or other medical factors.  HTN, diabetes, HX of MI, TIA  Age    Functional limitations are described as occurring with:   getting out of the car, getting out of a chair, unstable as he walks'    Rhomberg: eyes open with nudges:  Pt able to stay stable with nudges side to side and forward and backward  Rhomberg eyes closed: nudges forward, backward and side to side.  Able to stay stable    Exercises:  Exercise #1: clamshells   work up to 20 reps  Comment #1: bridges  hold 10 seconds X 6-12 reps  Exercise #2: Quad set ( to do with all hip ex),  "SLR, prone hip ext, sidelying hip abd  Comment #2: sitting hip isometric adduction with pillow between knees, hold 10 seconds X 10 reps.  Work up to 100% effort  Exercise #3: sit to stands in many chairs, many chairs 3-5 times  Comment #3: heel and toe raises  X 20  Exercise #4: 1 leg stance   owrk up to 15-20 seconds  Comment #4: Nu-step   seat 9, arms 6, workload is 5   3'  Exercise #5: marching on tram[employer]ated [employer]ightsine  with two hands for support   X 20  Exercise #6: standing hip ab and ext  Comment #6: x10 B each  Exercise #7: airex-two feet, no hands.  Slight weight shift but able to correct with no cues  Comment #7: toe tap on 8\" step  with one hand holding on   X 20  Exercise #8: bosu: weight shift   X 10, one hand for support    Treatment Today     TREATMENT MINUTES COMMENTS   Evaluation     Self-care/ Home management     Manual therapy     Neuromuscular Re-education     Therapeutic Activity     Therapeutic Exercises 25 See above or flow sheet;  Added airex, toe tap and bosu.     Gait training     Modality__________________                Total 25    Blank areas are intentional and mean the treatment did not include these items.       Annamaria Amador, PT  3/12/2018    "

## 2021-06-16 NOTE — PROGRESS NOTES
"[facility] Daily Progress     Patient Name: Levy Alba  Date: 3/16/2018  Visit #: 18-18  PTA #1  Referral Diagnosis: Gait difficulty  Referring provider: Eduardo Valdez MD  Visit Diagnosis:     ICD-10-CM    1. Unsteadiness on feet R26.81    2. Generalized muscle weakness M62.81    3. Gait disturbance R26.9          Assessment:     Patient is benefitting from skilled physical therapy and is making steady progress toward functional goals.  Patient is appropriate to continue with skilled physical therapy intervention, as indicated by initial plan of care.  Patient was very frustrated today with his challenge of carioca.  Feel he is \"tired\" of the weaker right side, dragging as he walks and the \"inability\" to do what he wants with it.    His right  foot dragged as he did the hurdles and had to make a conscious effort to make sure it cleared over.       Goal Status:   Pt. will be independent with home exercise program in : 12 weeks  Pt will: Able to get in and out of a car easier as strength improves within 12 weeks  Pt will: Able to get out of a chair easier as strength improves within 12 weeks  Pt will: Able to feel more stable and less leaning ambulating on even surfaces within 12 weeks  Pt will: Able to resume going to the [facility] for regular exercise within 12 weeks     Slow progress toward goals    Plan / Patient Education:     .    Discharge next visit    Has a neurologist work-up in April  Subjective:     Pain Ratin    I still drag my right foot and it isn't changing.  I think I want to be done here and go back to the [facility] next week.  I can keep doing my exercises and more there.    Objective:   Barriers to Learning or Achieving Goals:  Co-morbidities or other medical factors.  HTN, diabetes, HX of MI, TIA  Age    Functional limitations are described as occurring with:   getting out of the car, getting out of a chair, unstable as he walks'    Rhomberg: eyes open with nudges:  Pt " "able to stay stable with nudges side to side and forward and backward  Rhomberg eyes closed: nudges forward, backward and side to side.  Able to stay stable    Exercises:  Exercise #1: clamshells   work up to 20 reps  Comment #1: bridges  hold 10 seconds X 6-12 reps  Exercise #2: Quad set ( to do with all hip ex), SLR, prone hip ext, sidelying hip abd  Comment #2: sitting hip isometric adduction with pillow between knees, hold 10 seconds X 10 reps.  Work up to 100% effort  Exercise #3: sit to stands in many chairs, many chairs 3-5 times  Comment #3: heel and toe raises  X 20  Exercise #4: 1 leg stance   owrk up to 15-20 seconds  Comment #4: Nu-step   seat 9, arms 6, workload is 5   3'  Exercise #5: marching on tram[employer]ine  with two hands for support   X 20  Exercise #6: standing hip ab and ext  Comment #6: x10 B each  Exercise #7: airex-two feet, no hands.  Slight weight shift but able to correct with no cues  Comment #7: toe tap on 8\" step  with one hand holding on   X 20  Exercise #8: bosu: weight shift   X 10, one hand for support     Today: heel to toe with min A X 10' X3  Carioca-attempted with left foot.  Pt got frustrated and said he did not want to do it any more  Hurdles: 4 small, 4 medium and 1 large leading with each foot twice with stand by to Min A    Treatment Today     TREATMENT MINUTES COMMENTS   Evaluation     Self-care/ Home management     Manual therapy     Neuromuscular Re-education 15 See above   Therapeutic Activity     Therapeutic Exercises 10 See above or flow sheet;  reviewed airex, toe tap and bosu.     Gait training     Modality__________________                Total 25    Blank areas are intentional and mean the treatment did not include these items.       Annamaria Amador, PT  3/16/2018    "

## 2021-06-16 NOTE — PROGRESS NOTES
Office Visit - Follow Up   Levy Alba   78 y.o. male    Date of Visit: 2/21/2018    Chief Complaint   Patient presents with     Follow-up     F/U right ear laceration and infection, still taking ABX, needs ear checked        Assessment and Plan   1. Laceration of right ear, initial encounter  He was  seen at urgent care yesterday check on his right ear.  Removed his sutures.  They thought it was a bit red and started him on antibiotics.  He will continue with these.  Looks okay to me today.  He can finish out the antibiotics.    2. Essential hypertension  Pressure today 122/60.    3. Gait disturbance  Since last visit he has had a rheumatological workup at the neurologist including sed rate, CRP, lupus panel, rheumatoid factor and CK total.  All these were normal.  She also now has had a spinal tap with removal of 75 mL's of spinal fluid.  His did not change his gait whatsoever.  Spinal fluid was unremarkable.  Culture even has returned normal.  He has been seen in follow-up by his neurologist Dr. Valdez 2 days ago.  With no response to trial of removing fluid from his CSF will proceed with physical therapy to try and improve his strength in his right leg.  On exam today he still drags his right leg just slightly with walking.        No Follow-up on file.     History of Present Illness   This 78 y.o. old fell in the bathroom being on a rug and struck his right ear and his head on a faucet.  Went to the emergency room with lacerated right ear on 2/13/2018.  They tried their best to suture a laceration in his pinna he now was seen in urgent care and they remove the sutures yesterday and started him on some antibiotics as described above.  He was recently in the hospital from 1/16/through 1/18 2018 for a cough and also gait disturbance.  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  Arrangements have been made for a trial of removing some CSF fluid.  This was accomplished on 2/14/2018.  Any 5 cc  "were removed this did not improve his gait.  He still has some mild dragging of his right foot and mild gait instability.  CSF fluid was unremarkable as above.  evaluation for any rheumatological pathology was done by blood studies as above.  Now moving on to physical therapy.  Physical therapy was started today and will be done twice per week.    Review of Systems: A comprehensive review of systems was negative except as noted.     Medications, Allergies and Problem List   Reviewed and updated     Physical Exam   General Appearance:       /60 (Patient Site: Right Arm, Patient Position: Sitting, Cuff Size: Adult Large)  Pulse 66  Ht 5' 10\" (1.778 m)  Wt 170 lb (77.1 kg)  SpO2 98%  BMI 24.39 kg/m2    He walks rather briskly and seems to me that his balance is fairly good.  He does not have a broad-based gait.  He does however just slightly drag his right leg.  His straight leg raising is negative on the right and left.  He has no back pain and no radicular pain.     Additional Information   Current Outpatient Prescriptions   Medication Sig Dispense Refill     aspirin 81 mg chewable tablet Chew 81 mg daily.       atorvastatin (LIPITOR) 20 MG tablet Take 20 mg by mouth at bedtime.       cephalexin (KEFLEX) 500 MG capsule Take 2 capsules by mouth as needed. Take 2 cap 3 x daily x 5 days and then 2 cap 2 x daily x 5 days       cholecalciferol, vitamin D3, 1,000 unit tablet Take 1,000 Units by mouth daily.        CONTOUR TEST STRIPS strips TEST 2 TIMES DAILY AS DIRECTED(E119) 200 strip 1     fluticasone (FLONASE) 50 mcg/actuation nasal spray 2 sprays into each nostril daily. 16 g 12     glimepiride (AMARYL) 4 MG tablet Take 4 mg by mouth 2 (two) times a day before meals.       metFORMIN (GLUCOPHAGE) 1000 MG tablet Take 1,000 mg by mouth 2 (two) times a day with meals.       metoprolol tartrate (LOPRESSOR) 50 MG tablet Take 50 mg by mouth at bedtime.       multivitamin therapeutic tablet Take 1 tablet by mouth " daily.       omega-3 fatty acids-vitamin E (FISH OIL) 1,000 mg cap Take 1 capsule by mouth daily.       pyridoxine, vitamin B6, (VITAMIN B-6) 100 MG tablet Take 100 mg by mouth daily.       tamsulosin (FLOMAX) 0.4 mg Cp24 Take 0.4 mg by mouth daily after supper.       No current facility-administered medications for this visit.      Allergies   Allergen Reactions     Amiodarone      neuropathy     Lisinopril Cough     Niacin Unknown     Penicillins Hives     Social History   Substance Use Topics     Smoking status: Former Smoker     Quit date: 1/1/1985     Smokeless tobacco: Never Used     Alcohol use No       Review and/or order of clinical lab tests:  Review and/or order of radiology tests:  Review and/or order of medicine tests:  Discussion of test results with performing physician:  Decision to obtain old records and/or obtain history from someone other than the patient:  Review and summarization of old records and/or obtaining history from someone other than the patient and.or discussion of case with another health care provider:  Independent visualization of image, tracing or specimen itself:    Time: total time spent with the patient was 25 minutes of which >50% was spent in counseling and coordination of care     Flakito Morton MD

## 2021-06-16 NOTE — PROGRESS NOTES
[facility] Daily Progress     Patient Name: Levy Alba  Date: 3/5/2018  Visit #: 18-18  Referral Diagnosis: Gait difficulty  Referring provider: Eduardo Valdez MD  Visit Diagnosis:     ICD-10-CM    1. Unsteadiness on feet R26.81    2. Generalized muscle weakness M62.81    3. Gait disturbance R26.9          Assessment:     Patient is benefitting from skilled physical therapy and is making steady progress toward functional goals.  Patient is appropriate to continue with skilled physical therapy intervention, as indicated by initial plan of care.  Patient feels the exercises today will help with his leg strength.  His family is concerned he still will drag his right foot a little but pt feels that will improve with time.    Goal Status:  Pt. will be independent with home exercise program in : 12 weeks  Pt will: Able to get in and out of a car easier as strength improves within 12 weeks  Pt will: Able to get out of a chair easier as strength improves within 12 weeks  Pt will: Able to feel more stable and less leaning ambulating on even surfaces within 12 weeks  Pt will: Able to resume going to the [facility] for regular exercise within 12 weeks    Plan / Patient Education:     Progress HEP for strengthening  Balance and proprioception exercises    Question standing hip exercises      Subjective:     Pain Ratin    I still feel weak but overall everything is ok  My family says I still drag my right leg when I walk    Objective:   Barriers to Learning or Achieving Goals:  Co-morbidities or other medical factors.  HTN, diabetes, HX of MI, TIA  Age    Functional limitations are described as occurring with:   getting out of the car, getting out of a chair, unstable as he walks'    Rhomberg: eyes open with nudges:  Pt able to stay stable with nudges side to side and forward and backward  Rhomberg eyes closed: nudges forward, backward and side to side.  Able to stay stable    Exercises:  Exercise  #1: clamshells   work up to 20 reps  Comment #1: bridges  hold 10 seconds X 6-12 reps  Exercise #2: Quad set ( to do with all hip ex), SLR, prone hip ext, sidelying hip abd  Comment #2: sitting hip isometric adduction with pillow between knees, hold 10 seconds X 10 reps.  Work up to 100% effort  Exercise #3: sit to stands in many chairs, many chairs 3-5 times  Comment #3: heel and toe raises  X 20  Exercise #4: 1 leg stance   owrk up to 15-20 seconds  Comment #4: Nu-step   seat 9, arms 6, workload is 5   3'  Exercise #5: marching on trampoline  with two hands for support   X 20    Treatment Today     TREATMENT MINUTES COMMENTS   Evaluation     Self-care/ Home management     Manual therapy     Neuromuscular Re-education     Therapeutic Activity     Therapeutic Exercises 25 See above or flow sheet;  Added exercise #3-exercise #5 on flow sheet.     Gait training     Modality__________________                Total 25    Blank areas are intentional and mean the treatment did not include these items.       Annamaria Amador, PT  3/5/2018

## 2021-06-16 NOTE — PROGRESS NOTES
"[facility] Daily Progress     Patient Name: Levy Alba  Date: 3/8/2018  Visit #: 18-18  PTA #1  Referral Diagnosis: Gait difficulty  Referring provider: Eduardo Valdez MD  Visit Diagnosis:     ICD-10-CM    1. Unsteadiness on feet R26.81    2. Generalized muscle weakness M62.81    3. Gait disturbance R26.9          Assessment:     Patient is benefitting from skilled physical therapy and is making steady progress toward functional goals.  Patient is appropriate to continue with skilled physical therapy intervention, as indicated by initial plan of care.  Patient feels the exercises today will help with his leg strength.  His family is concerned he still will drag his right foot a little but pt feels that will improve with time.  Pt requires verbal and tactile cues to maintain quad set with SLR.    Goal Status: On going  Pt. will be independent with home exercise program in : 12 weeks  Pt will: Able to get in and out of a car easier as strength improves within 12 weeks  Pt will: Able to get out of a chair easier as strength improves within 12 weeks  Pt will: Able to feel more stable and less leaning ambulating on even surfaces within 12 weeks  Pt will: Able to resume going to the [facility] for regular exercise within 12 weeks    Plan / Patient Education:     Progress HEP for strengthening  Balance and proprioception exercises    Go over standing hip exercises.      Subjective:     Pain Ratin    Pt reports no falls but states\" I loose my balance when I walk too far.\"   Pt reports he tries to go to the [facility] every day. He uses the [employer] and walks on the track.   Pt reports he is still dragging his R LE.    Objective:   Barriers to Learning or Achieving Goals:  Co-morbidities or other medical factors.  HTN, diabetes, HX of MI, TIA  Age    Functional limitations are described as occurring with:   getting out of the car, getting out of a chair, unstable as he walks'    Rhomberg: eyes open " with nudges:  Pt able to stay stable with nudges side to side and forward and backward  Rhomberg eyes closed: nudges forward, backward and side to side.  Able to stay stable    Exercises:  Exercise #1: clamshells   work up to 20 reps  Comment #1: bridges  hold 10 seconds X 6-12 reps  Exercise #2: Quad set ( to do with all hip ex), SLR, prone hip ext, sidelying hip abd  Comment #2: sitting hip isometric adduction with pillow between knees, hold 10 seconds X 10 reps.  Work up to 100% effort  Exercise #3: sit to stands in many chairs, many chairs 3-5 times  Comment #3: heel and toe raises  X 20  Exercise #4: 1 leg stance   owrk up to 15-20 seconds  Comment #4: Nu-step   seat 9, arms 6, workload is 5   3'  Exercise #5: marching on trampoline  with two hands for support   X 20  Exercise #6: standing hip ab and ext  Comment #6: x10 B each    Treatment Today     TREATMENT MINUTES COMMENTS   Evaluation     Self-care/ Home management     Manual therapy     Neuromuscular Re-education     Therapeutic Activity     Therapeutic Exercises 25 See above or flow sheet;  Added to HEP:  -standing hip and hip extension     Gait training     Modality__________________                Total 25    Blank areas are intentional and mean the treatment did not include these items.       Sonia Isbell, PTA,CLT  3/8/2018

## 2021-06-16 NOTE — PROGRESS NOTES
[facility] Daily Progress     Patient Name: Levy Alba  Date: 3/2/2018  Visit #: 3/12  2/21/18-18  Referral Diagnosis: Gait difficulty  Referring provider: Eduardo Valdez MD  Visit Diagnosis:     ICD-10-CM    1. Unsteadiness on feet R26.81    2. Generalized muscle weakness M62.81    3. Gait disturbance R26.9          Assessment:     Patient is benefitting from skilled physical therapy and is making steady progress toward functional goals.  Patient is appropriate to continue with skilled physical therapy intervention, as indicated by initial plan of care.  Patient struggled with sidelying hip abduction.  He rolls his hip backwards and lifts leg forward.  Tried cues-verbal and tactile, but pt still struggled.  Pt to try at home.  If unable to get technique correct may switch hip ex to standing    Goal Status:  Pt. will be independent with home exercise program in : 12 weeks  Pt will: Able to get in and out of a car easier as strength improves within 12 weeks  Pt will: Able to get out of a chair easier as strength improves within 12 weeks  Pt will: Able to feel more stable and less leaning ambulating on even surfaces within 12 weeks  Pt will: Able to resume going to the [facility] for regular exercise within 12 weeks    Plan / Patient Education:     Progress HEP for strengthening  Balance and proprioception exercises    Question standing hip exercises  Review sidelying hip abd    Subjective:     Pain Ratin    My right leg seems to be very weak    Objective:   Barriers to Learning or Achieving Goals:  Co-morbidities or other medical factors.  HTN, diabetes, HX of MI, TIA  Age    Functional limitations are described as occurring with:   getting out of the car, getting out of a chair, unstable as he walks'    Rhomberg: eyes open with nudges:  Pt able to stay stable with nudges side to side and forward and backward  Rhomberg eyes closed: nudges forward, backward and side to side.  Able to stay  stable    Exercises:  Exercise #1: clamshells   work up to 20 reps  Comment #1: bridges  hold 10 seconds X 6-12 reps  Exercise #2: Quad set ( to do with all hip ex), SLR, prone hip ext  Comment #2: sitting hip isometric adductionw ith pillow between knees, hold 10 seconds X 10 reps.  Work up to 100% effort    Treatment Today     TREATMENT MINUTES COMMENTS   Evaluation     Self-care/ Home management     Manual therapy     Neuromuscular Re-education     Therapeutic Activity     Therapeutic Exercises 25 See above or flow sheet;  Discussed clamshells and bridges     Gait training     Modality__________________                Total 25    Blank areas are intentional and mean the treatment did not include these items.       Annamaria Amador, PT  3/2/2018

## 2021-06-16 NOTE — PROGRESS NOTES
[facility] Daily Progress /Discharge    Patient Name: Levy Alba  Date: 3/19/2018  Visit #: 18-18  PTA #1  Referral Diagnosis: Gait difficulty  Referring provider: Eduardo Valdez MD  Visit Diagnosis:     ICD-10-CM    1. Unsteadiness on feet R26.81    2. Generalized muscle weakness M62.81    3. Gait disturbance R26.9          Assessment:     Feel patient's balance and strength have improved slightly but is not as strong as anticipated after one month of physical therapy. He will follow up with the neurologist next month for a work-up and testing.   Patient has been compliant with his HEP and will return to the [facility] to strengthen.    Goal Status:   Pt. will be independent with home exercise program in : 12 weeks  Pt will: Able to get in and out of a car easier as strength improves within 12 weeks  Pt will: Able to get out of a chair easier as strength improves within 12 weeks  Pt will: Able to feel more stable and less leaning ambulating on even surfaces within 12 weeks  Pt will: Able to resume going to the [facility] for regular exercise within 12 weeks     1) Goal met Pt is independent with his HEP and will return to the [facility] to continue his workouts  2) not met/partially met- some days are easier than others.  Overall still challenging  3) partially met/goal met pt feels it is easy enough to get in and out of a chair with ease when ever he wants (but APTA score did not change and therapist felt the patient struggled at times)  4) not met still have the same issues on the right side  5) goal met pt will be returning this week once physical therapy is done    Plan / Patient Education:     .  Patient is discharged at this time.  Will start back at the [facility]    Has a neurologist work-up in April  Subjective:     Pain Ratin    I still drag my right foot and it isn't changing.  I think I want to be done here and go back to the [facility] next week.  I can keep doing my exercises and more  "there.    Objective:     Hip/Knee Strength  Date: 2/21/18 3/19/18    Hip/Knee Strength (/5) MMT MMT MMT    Right Left Right Left Right Left   Hip Flexion 5 5       Hip Abduction 5 5       Hip Adduction 5 5       Hip Extension         Hip External Rotation         Hip Internal Rotation         Knee Extension 5 4+  4     Knee Flexion 4+ 4 4 4       Lower Extremity Functional Scale (_/80): 46 , was 24 on the initial evaluation    APTA score: 9 , was 9 on the initial evaluation  Pelaez Score : 45, was 40 on the initial evaluation    Ambulation: feel patient drags right leg more than when he first started physical therapy.  His PELAEZ score went up but feel the quality of his ambulation has decreased.    Below is pt's exercise program.  Some or all of the exercises may be performed at any given session.  Exercises:  Exercise #1: clamshells   work up to 20 reps  Comment #1: bridges  hold 10 seconds X 6-12 reps  Exercise #2: Quad set ( to do with all hip ex), SLR, prone hip ext, sidelying hip abd  Comment #2: sitting hip isometric adduction with pillow between knees, hold 10 seconds X 10 reps.  Work up to 100% effort  Exercise #3: sit to stands in many chairs, many chairs 3-5 times  Comment #3: heel and toe raises  X 20  Exercise #4: 1 leg stance   owrk up to 15-20 seconds  Comment #4: Nu-step   seat 9, arms 6, workload is 5   3'  Exercise #5: marching on [email or web address]  with two hands for support   X 20  Exercise #6: standing hip ab and ext  Comment #6: x10 B each  Exercise #7: airex-two feet, no hands.  Slight weight shift but able to correct with no cues  Comment #7: toe tap on 8\" step  with one hand holding on   X 20  Exercise #8: bosu: weight shift   X 10, one hand for support     Today: heel to toe with min A X 10' X3  [place]ari[place]-attempted with left foot.  Pt got frustrated and said he did not want to do it any more  Hurdles: 4 small, 4 medium and 1 large leading with each foot twice with stand by to Min A    Treatment Today   "   TREATMENT MINUTES COMMENTS   Evaluation     Self-care/ Home management     Manual therapy     Neuromuscular Re-education 10 See above.  1 leg stance   Therapeutic Activity     Therapeutic Exercises 15 See above or flow sheet;       Gait training     Modality__________________                Total 25    Blank areas are intentional and mean the treatment did not include these items.       Annamaria Amador, PT  3/19/2018

## 2021-06-16 NOTE — PROGRESS NOTES
[facility]   Balance Initial Evaluation        [facility] Certification Request    February 21, 2018      Patient: Levy Alba  MR Number: [id number]  YOB: 1940  Date of Visit: 2/21/2018      Dear Dr. Valdez    Thank you for this referral.   We are seeing Levy Alba for Physical Therapy of gait disturbance.    Medicare and/or Medicaid requires physician review and approval of the treatment plan. Please review the plan of care and verify that you agree with the therapy plan of care by co-signing this note.        If you have any questions or concerns, please don't hesitate to call.    Sincerely,      Annamaria Amador        Physician recommendation:     ___ Follow therapist's recommendation        ___ Modify therapy      *Physician co-signature indicates they certify the need for these services furnished within this plan and while under their care.        Patient Name: Levy Alba  Date of evaluation: 2/21/2018  Referral Diagnosis: Gait difficulty  Referring provider: Flakito Morton MD  Visit Diagnosis:     ICD-10-CM    1. Unsteadiness on feet R26.81    2. Generalized muscle weakness M62.81        Assessment:      Pt. is appropriate for skilled PT intervention as outlined in the Plan of Care (POC).  Pt. is a good candidate for skilled PT services to improve pain levels and function.  Functional limitations are described as occurring with: getting out of the car, getting out of a chair, unstable as he walks', does not go to the [facility] on a regular basis    Goals:  Pt. will be independent with home exercise program in : 12 weeks  Pt will: Able to get in and out of a car easier as strength improves within 12 weeks  Pt will: Able to get out of a chair easier as strength improves within 12 weeks  Pt will: Able to feel more stable and less leaning ambulating on even surfaces within 12 weeks  Pt will: Able to resume going to the [facility] for regular exercise within 12  weeks    Patient's expectations/goals are realistic.    Barriers to Learning or Achieving Goals:  Co-morbidities or other medical factors.  HTN, diabetes, HX of MI, TIA  Age.        Plan / Patient Instructions:        Plan of Care:   Authorization / Certification Start Date: 02/21/18  Authorization / Certification End Date: 05/18/18  Authorization / Certification Number of Visits: 12  Communication with: Referral Source  Patient Related Instruction: Nature of Condition;Treatment plan and rationale;Self Care instruction;Basis of treatment  Times per Week: 2  Number of Weeks: 12  Number of Visits: 12  Therapeutic Exercise: ROM;Stretching;Strengthening  Neuromuscular Reeducation: balance/proprioception  Gait Training: as needed    Plan for next visit: Assess [place]                               Intro basic strengthening for core and LE for home                               Gait and balance activities     Subjective:         Social information:   Living Situation:lives with others  and in a twin home with no stairs   Occupation:retired   Equipment Available: None, since the fall he does use a walker at home in the bedroom only    History of Present Illness:    Levy is a 78 y.o. male who presents to therapy today with complaints of balance issues. Date of onset/duration of symptoms is  February 13, 2018. Onset was sudden and related to a fall.  Patient got up in the middle of the night to go to the bathroom and fell.  He hit his head on the tub and got 7 stitches in his ear.  He saw a neurologist in the hospital and followed up with him for an office visit where he suggested he go to physical therapy for his balance.  Prior to the fall he would notice his balance was off as he got out of bed.  That always resolved  Right away but he did notice as the day progressed he would lean more with his walking.  He does have a walker that was his mother-in-law's but he only uses that when he gets out of bed to go to the  "bathroom.  Symptoms are not improving. (neither his wife or the patient feels he needs any assistive device for ambulation on a regular basis).  Upper back and neck are a little sore from the fall but hsi wife puts Aspercreme on it and it takes the pain away.  He was going to the [facility] daily up until a month ago after his first fall.  He has not been back.      Pain Ratin  Pain rating at best: 0  Pain rating at worst: 0  Pain description: weakness    Functional limitations are described as occurring with:   getting out of the car, getting out of a chair, unstable as he walks'         Objective:      Note: Items left blank indicates the item was not performed or not indicated at the time of the evaluation.    Patient Outcome Measures :    Lower Extremity Functional Scale (_/80): 24   Scores range from 0-80, where a score of 80 represents maximum function. The minimal clinically important difference is a positive change of 9 points.    Balance Examination  1. Unsteadiness on feet     2. Generalized muscle weakness       Involved side: Bilateral  Posture Observation:      General standing posture is fair.  Assistive Device:  None, but will use a walker in the bedroom to go to the bathroom  Gait Observation:  Decrease in step length, catches left foot occasionally, \"stiff\" posture with ambulation  Ambulating and looking up and down pt will shorten his step length and pace    LE Strength:   Hip/Knee Strength  Date: 18     Hip/Knee Strength (/5) MMT MMT MMT    Right Left Right Left Right Left   Hip Flexion 5 5       Hip Abduction 5 5       Hip Adduction 5 5       Hip Extension         Hip External Rotation         Hip Internal Rotation         Knee Extension 5 4+       Knee Flexion 4+ 4         LE ROM: within functional limits    Balance Assessment:    Whitt:  Whitt Score: 40 /56  APTA sit < > stand:  9 in 30 seconds with use of both hands.  Able to stand with two feet unsupported eyes open  Slight sway with standing " on two feet with eyes closed  1 leg stance: L=2 seconds, R=3 seconds    Treatment Today     TREATMENT MINUTES COMMENTS   Evaluation 40 Balance/gait   Self-care/ Home management     Manual therapy     Neuromuscular Re-education     Therapeutic Activity     Therapeutic Exercises 20 Edu on DX, POC, program he used to perform at [facility], questions   Gait training     Modality__________________                Total 60    Blank areas are intentional and mean the treatment did not include these items.     PT Evaluation Code: (Please list factors)  Patient History/Comorbidities: HX of MI, diabetes, HTN, TIA  Examination: difficulty getting out of car, sit to stand, decrease LE strength,   Clinical Presentation: evolving  Clinical Decision Making: moderate    Patient History/  Comorbidities Examination  (body structures and functions, activity limitations, and/or participation restrictions) Clinical Presentation Clinical Decision Making (Complexity)   No documented Comorbidities or personal factors 1-2 Elements Stable and/or uncomplicated Low   1-2 documented comorbidities or personal factor 3 Elements Evolving clinical presentation with changing characteristics Moderate   3-4 documented comorbidities or personal factors 4 or more Unstable and unpredictable High              Annamaria Amador, PT  2/21/2018  10:34 AM

## 2021-06-16 NOTE — PROCEDURES
[facility] Post Procedure    Procedure: lumbar puncture    Radiologist: Regino Ko    Contrast: 0 mL Omnipaque  Fluoro Time: 0.2 minutes  Images: 1    EBL: none  Complications: none    Preliminary findings: (see dictation for full detail)  Technically successful lumbar puncture via L4-5  21 cc clear csf   Opening pressure: 20 cmH2O  Closing pressure: 10 cmH2O    Assess/Plan:   Bedrest for 1-2 hours then discharge home    Regino Ko

## 2021-06-16 NOTE — PROGRESS NOTES
Physical Therapy Outpatient NPH Assessment/Treatment  Levy Alba   2/14/2018   Diagnoses: Possible Normal Pressure Hydrocephalus (NPH),   Patient Active Problem List   Diagnosis     Chest Pain     Postherpetic Neuralgia     Sebaceous Cyst     Influenza     Vasovagal Syncope     Acute Myocardial Infarction     Paroxysmal Ventricular Tachycardia     Dizziness     Type 2 diabetes mellitus with diabetic neuropathy     Hypercholesterolemia     Transient Ischemic Attack     Lumbar Radiculopathy     CAD (coronary artery disease)     ICD (implantable cardioverter-defibrillator), dual, in situ     HTN (hypertension)     Squamous cell carcinoma of left hand     Squamous cell carcinoma of skin of left upper arm     Squamous cell carcinoma of skin of right temple     Squamous cell carcinoma, face     Imbalance     Gait disturbance     Past Medical History:   Diagnosis Date     Acute myocardial infarction      CAD (coronary artery disease)      DM2 (diabetes mellitus, type 2)      HLD (hyperlipidemia)      ICD (implantable cardioverter-defibrillator), dual, in situ      TIA (transient ischemic attack)        Subjective-  Subjective Information/Comments: Pt lives with his wife in a one level home and has no steps.  The pt said he usually walks indep without any AD but has used the FWW lately due to a fall he had yesterday.  Pt said he did go to the ER for this fall.  He does have a black right eye and some skin abrasions on the right side of his head.  His family said he has more problems walking when he gets out of bed in the morning.  His wife said he does have to get assist to get out of bed.  The pt said he walks uneven and is not sure about his steps.  He said the right leg seems to catch more with walking and his steps get shorter.                Has patient fallen in the last 6 months? Yes.  Yesterday per the pt and he did go to the ER yesterday for this.     Does patient receive assistance with the following at  baseline:  Supine<>sit Min assist with this with getting out of bed.    Sit<>stand No [place]   Gait No rolling walker.  A few days ago, he ambulated without AD    Stairs No, pt does not use any steps.       Objective-    Pain: Yes, sore shoulders and neck from a fall yesterday.  Pt is ok with testing today.      Left LE Assessment- 5/5  Right LE Assessment- 5/5 with Hip flex 4+/5    Transfer Status:   Sit>stand Supervision / Standby Assist   Stand>sit Supervision / Standby Assist    Comments: Pt tends to start to sit down to far from the chair. Pt does use his hands for transfers.     Gait Speed:   Gait speed was .73  meters per second, indicating decreased speed and patient is at risk for falls.   Gait Speed Interpretation   >1.2 m/sec Independent in all activities and crossing street   0.8-1.0 m/sec Community ambulator, household activities. May need intervention to reduce falls risk.   0.6-0.8 m/sec Performs self-care, limited community ambulator. May need intervention to reduce falls risk.   <0.6 m/sec Dependent in ADLs, household ambulator. Needs intervention to reduce falls risk.    Minimal Detectable Change for preferred gait (PD) = 0.18 meters/sec  Pt did use the FWW for testing today. He does  the walker and turn around. No LOB.      Timed Up & Go (TUG) Balance Assessment: Average 19.67 sec   Trial 1 18.78 sec; Trial 2 21.31 sec; Trial 3 18.94 sec       Gait assistive device used: rolling walker   Pt did  the walker with turning.  Pt is at increased risk of falling in the community dwelling older adults.   Time  Interpretation   <10 seconds Freely mobile   >13.5 seconds Indicates increased risk for falling in community dwelling older adults   >20 seconds Indicates impaired functional mobility; may need an assistive device   >30 seconds Indicates dependency in most ADL and mobility skills   The TUG is a test of basic mobility skills.  It is used to screen individuals prone to falls.     Minimal Detectable Change for patients with Alzheimzer s = 4.09 sec    Minimal Detectable Change for patients with Parkinson s Disease = 3.5 sec       Five Times Sit to Stand Test: (FTSST) The FTSTST measures functional LE strength and provides information about postural control during transitions to/from standing.    Patient Score: 16.52 seconds  Did patient require use of hands to complete? Yes (If yes, the test is not valid; however, still gives objective measurement of function)   The pt was able to do sit<> stand x 3 reps without hand.  He had a LOB backwards and sat back down in the chair and did use his hands.      Interpretation of Results: > 16.0 seconds indicates risk of falls.    Average score (diagnosis of PD) =9.67 seconds (range = 5.9 - 13.5 sec)    Minimal Detectable Change = 2.5 sec    Minimally Clinical Important Difference = 2.3 sec    Additional Gait Training/Comments: Pt did use the walker for testing for the TUG and gait speed tests  above.  Pt did not have any LOB with the walker.  His feet did pass each other and he did catch the right foot with swing phase on occ.  He does  the walker when turning around.  He did walk 20'x3 and 60'x3 with the RW with distant supervision.         PT also had to pt walk without the walker.  He steps did pass each other and his steps become smaller with increased fatigue. Steps are continuous.   He has a slight lean to the right side.  He does catch his right foot with swing phase  on occ.  Turns are continuous.  Pt walked 300' with SBA without AD. Decreased bilat arm swing.         Education: PT did spend increased time educating the pt and multiple family members on the care plan and they had questions about NPH.           Assessment-  Pt is a 78 y.o. y.o. male referred to Physical Therapy for evaluation and treatment of Normal Pressure Hydrocephalus (NPH). Assessment findings will be compared to post-lumbar puncture data in order to assist with  decision making regarding shunt placement. Assessment findings indicate that patient is at medium risk of falls.       Physical Therapy Diagnosis: Gait instability  Impairments: Decreased balance, Pt did have a back eye on the right side from a fall he had yesterday.  Pt did go to the ER yesterday per the family.    Functional Limitations: Decreased functional mobility, gait, and balance resulting in increased risk for falls    Plan-  Treatment Plan will include: Therapeutic activity/Transfer training, Patient/caregiver education, Gait training, Neuromuscular re-education    Frequency/Duration: 2x/week, for 1 week; up to 2 visits    Goals:  -Patient will ambulate 300 with least restrictive assistive device independently, in order to ambulate household distance.   - Patient will demonstrate meaningful improvement in standardized tests in order to improve community mobility and reduce falls risk.   -Patient will demonstrate understanding of education regarding NPH and applicable exercises to improve balance and falls risk.       Treatment Time/Interventions:    Gait training 15 minutes   Neuro Re-ed 15 minutes       Peggy Terrazas, PT 2/14/2018     Physician Recommendation:  1. I certify the need for these services furnished within this plan and while under my care. I agree with the therapist's recommendation for plan of care.  2. If there is any recommendation for modification of therapy plan, please indicate below.    Physician's Signature (Printed):  _____________________________

## 2021-06-17 NOTE — PROGRESS NOTES
"  Office Visit - Follow Up   Levy Alba   78 y.o. male    Date of Visit: 5/4/2018    Chief Complaint   Patient presents with     Follow-up     Needs PSA - Diabetic check - not fasting        Assessment and Plan   1. Benign prostatic hyperplasia with nocturia  We will check these laboratory studies.  He has been on Flomax at 0.4 mg nightly we will double this dose to 0.8 mg.  If these labs are unrevealing and his symptoms continue to persist or worsen we will arrange for urology visit.  - PSA (Prostatic-Specific Antigen), Diagnostic  - Urinalysis-UC if Indicated  - Culture, Urine    2. DM2 (diabetes mellitus, type 2)  Check A1c today today associated with the diabetes he has been diagnosed with neuropathy with an EMG done by Dr. Valdez good diabetic control is.  Essential here.  Discomfort is not significant enough to use any additional meds such as gabapentin or Lyrica  - Glycosylated Hemoglobin A1c          No Follow-up on file.     History of Present Illness   This 78 y.o. old comes in for follow-up on his diabetes.  He was in the emergency room after a fall on 2/13/18 no injuries occurred but his family is concerned about his balance.  He has seen a neurologist Dr. Valdez and had an EMG done assistant with diabetic neuropathy.  He had 8 visits of physical therapy and he thinks his gait is better now.  His chief complaint today is that he is having nocturia at night about 3 times per night occasional leakage during the day.  He is wearing a pad.  Most of the time however he thinks his urinary stream is quite strong.  He reports no dribbling.  No blood in his urine.    Review of Systems: A comprehensive review of systems was negative except as noted.     Medications, Allergies and Problem List   Reviewed and updated     Physical Exam   General Appearance:       /68 (Patient Site: Right Arm, Patient Position: Sitting, Cuff Size: Adult Regular)  Pulse 67  Ht 5' 10\" (1.778 m)  Wt 169 lb (76.7 kg)  SpO2 " 98%  BMI 24.25 kg/m2    Blood pressure is quite excellent.  Heart rhythm is regular no murmurs.  Abdomen is soft and benign and nontender extremities show no edema.  I did a digital rectal exam today and this shows a modestly enlarged prostate 3-4+ size there are no nodularities.  Prostate is firm without areas of fluctuance     Additional Information   Current Outpatient Prescriptions   Medication Sig Dispense Refill     aspirin 81 mg chewable tablet Chew 81 mg daily.       atorvastatin (LIPITOR) 20 MG tablet TAKE 1 TABLET (20 MG) BY ORAL ROUTE ONCE DAILY 90 tablet 3     cholecalciferol, vitamin D3, 1,000 unit tablet Take 1,000 Units by mouth daily.        CONTOUR TEST STRIPS strips TEST 2 TIMES DAILY AS DIRECTED(E119) 200 strip 3     fluticasone (FLONASE) 50 mcg/actuation nasal spray 2 sprays into each nostril daily. 16 g 12     glimepiride (AMARYL) 4 MG tablet TAKE 1 TABLET (4 MG TOTAL) BY MOUTH 2 (TWO) TIMES A  tablet 3     metFORMIN (GLUCOPHAGE) 1000 MG tablet Take 1,000 mg by mouth 2 (two) times a day with meals.       metoprolol tartrate (LOPRESSOR) 50 MG tablet Take 50 mg by mouth at bedtime.       multivitamin therapeutic tablet Take 1 tablet by mouth daily.       omega-3 fatty acids-vitamin E (FISH OIL) 1,000 mg cap Take 1 capsule by mouth daily.       pyridoxine, vitamin B6, (VITAMIN B-6) 100 MG tablet Take 100 mg by mouth daily.       tamsulosin (FLOMAX) 0.4 mg Cp24 TAKE 1 CAPSULE BY MOUTH DAILY 90 capsule 4     No current facility-administered medications for this visit.      Allergies   Allergen Reactions     Amiodarone      neuropathy     Lisinopril Cough     Niacin Unknown     Penicillins Hives     Social History   Substance Use Topics     Smoking status: Former Smoker     Quit date: 1/1/1985     Smokeless tobacco: Never Used     Alcohol use No       Review and/or order of clinical lab tests:  Review and/or order of radiology tests:  Review and/or order of medicine tests:  Discussion of test  results with performing physician:  Decision to obtain old records and/or obtain history from someone other than the patient:  Review and summarization of old records and/or obtaining history from someone other than the patient and.or discussion of case with another health care provider:  Independent visualization of image, tracing or specimen itself:    Time: total time spent with the patient was 25 minutes of which >50% was spent in counseling and coordination of care     Flakito Morton MD

## 2021-06-18 NOTE — LETTER
Letter by Leelee Mendoza RDCS at      Author: Leelee Mendoza RDCS Service: -- Author Type: --    Filed:  Encounter Date: 3/7/2019 Status: (Other)       Levy Alba  [address] [facility] [address]      March 7, 2019      Dear Mr. Alba,    RE: Remote Results    We are writing to you regarding your recent Remote ICD check from home. Your transmission was received successfully. Battery status is satisfactory at this time.     Your results are within normal limits.    Your next device appointment will be a remote check on Bryanna 10, 2019; this will occur automatically.    To schedule or reschedule, please call [phone] and press 1.    NOTE: If you would like to do an extra transmission, please call [phone] and press 3 to speak to a nurse BEFORE transmitting. This ensures that the Device Clinic staff is aware of the reason you are sending a transmission, and can follow-up with you after it has been reviewed.    We will be checking your implanted device from home (remotely) every three months unless otherwise instructed. We will need to see you in the clinic at least once a year. You may need to be seen in the clinic sooner depending on the results of your check.    Please be aware:    The follow-up schedule is like a Physician prescription.    Your remote monitor is paired to your specific implanted device.      Sincerely,    [facility]

## 2021-06-18 NOTE — LETTER
Letter by Regino Gan MD at      Author: Regino Gan MD Service: -- Author Type: --    Filed:  Encounter Date: 1/23/2019 Status: (Other)       Levy Alba  [address] [facility] [address]             January 24, 2019         Dear Mr. Alba,    Below are the results from your recent visit:    Resulted Orders   Glycosylated Hemoglobin A1c   Result Value Ref Range    Hemoglobin A1c 6.6 (H) 3.5 - 6.0 %   Glucose   Result Value Ref Range    Glucose 190 (H) 70 - 125 mg/dL    Patient Fasting > 8hrs? No     Narrative    Fasting Glucose reference range is 70-99 mg/dL per  American Diabetes Association (ADA) guidelines.   Lipid Cascade   Result Value Ref Range    Cholesterol 121 <=199 mg/dL    Triglycerides 201 (H) <=149 mg/dL    HDL Cholesterol 39 (L) >=40 mg/dL    LDL Calculated 42 <=129 mg/dL    Patient Fasting > 8hrs? Unknown        All very good results    Please call with questions or contact us using [employer].    Sincerely,        Electronically signed by Regino Gan MD

## 2021-06-18 NOTE — LETTER
Letter by Regino Gan MD at      Author: Regino Gan MD Service: -- Author Type: --    Filed:  Encounter Date: 1/23/2019 Status: (Other)       Levy Alba  [address] [facility] [address]             January 23, 2019         Dear Mr. Alba,    Below are the results from your recent visit:    Resulted Orders   Glycosylated Hemoglobin A1c   Result Value Ref Range    Hemoglobin A1c 6.6 (H) 3.5 - 6.0 %   Glucose   Result Value Ref Range    Glucose 190 (H) 70 - 125 mg/dL    Patient Fasting > 8hrs? No     Narrative    Fasting Glucose reference range is 70-99 mg/dL per  American Diabetes Association (ADA) guidelines.   Lipid Cascade   Result Value Ref Range    Cholesterol 121 <=199 mg/dL    Triglycerides 201 (H) <=149 mg/dL    HDL Cholesterol 39 (L) >=40 mg/dL    LDL Calculated 42 <=129 mg/dL    Patient Fasting > 8hrs? Unknown        All very good results     Please call with questions or contact us using [employer].    Sincerely,        Electronically signed by Regino Gan MD

## 2021-06-18 NOTE — PROGRESS NOTES
RESPIRATORY CARE NOTE     Patient Name: Levy Alba  Today's Date: 6/13/2018     Complete PFT done. Pt performed tests with good effort. Albuterol neb given. Test results meet ATS criteria. Results scanned into epic. Pt left in no distress.       Marla Madrid LRT    RESPIRATORY CARE NOTE     Patient Name: Levy Alba  Today's Date: 6/13/2018     Problem List  Patient Active Problem List   Diagnosis     Chest Pain     Postherpetic Neuralgia     Sebaceous Cyst     Influenza     Vasovagal Syncope     Acute Myocardial Infarction     Paroxysmal Ventricular Tachycardia     Dizziness     Type 2 diabetes mellitus with diabetic neuropathy (H)     Hypercholesterolemia     Transient Ischemic Attack     Lumbar Radiculopathy     CAD (coronary artery disease)     ICD (implantable cardioverter-defibrillator), dual, in situ     HTN (hypertension)     Squamous cell carcinoma of left hand     Squamous cell carcinoma of skin of left upper arm     Squamous cell carcinoma of skin of right temple     Squamous cell carcinoma, face     Imbalance     Gait disturbance                           Marla Madrid LRT

## 2021-06-19 NOTE — LETTER
Letter by Regino Gan MD at      Author: Regino Gan MD Service: -- Author Type: --    Filed:  Encounter Date: 9/9/2019 Status: (Other)         Levy Alba  [address] [facility] [address]             September 9, 2019         Dear Mr. Alba,    Below are the results from your recent visit:    Resulted Orders   HM2(CBC w/o Differential)   Result Value Ref Range    WBC 6.7 4.0 - 11.0 thou/uL    RBC 3.81 (L) 4.40 - 6.20 mill/uL    Hemoglobin 12.5 (L) 14.0 - 18.0 g/dL    Hematocrit 37.9 (L) 40.0 - 54.0 %     80 - 100 fL    MCH 32.8 27.0 - 34.0 pg    MCHC 33.0 32.0 - 36.0 g/dL    RDW 12.8 11.0 - 14.5 %    Platelets 236 140 - 440 thou/uL    MPV 11.5 8.5 - 12.5 fL   Comprehensive Metabolic Panel   Result Value Ref Range    Sodium 139 136 - 145 mmol/L    Potassium 4.7 3.5 - 5.0 mmol/L    Chloride 103 98 - 107 mmol/L    CO2 25 22 - 31 mmol/L    Anion Gap, Calculation 11 5 - 18 mmol/L    Glucose 162 (H) 70 - 125 mg/dL    BUN 15 8 - 28 mg/dL    Creatinine 1.24 0.70 - 1.30 mg/dL    GFR MDRD Af Amer >60 >60 mL/min/1.73m2    GFR MDRD Non Af Amer 56 (L) >60 mL/min/1.73m2    Bilirubin, Total 0.4 0.0 - 1.0 mg/dL    Calcium 10.1 8.5 - 10.5 mg/dL    Protein, Total 7.0 6.0 - 8.0 g/dL    Albumin 3.9 3.5 - 5.0 g/dL    Alkaline Phosphatase 75 45 - 120 U/L    AST 18 0 - 40 U/L    ALT 20 0 - 45 U/L    Narrative    Fasting Glucose reference range is 70-99 mg/dL per  American Diabetes Association (ADA) guidelines.       All very good results    Please call with questions or contact us using [employer].    Sincerely,        Electronically signed by Regino Gan MD

## 2021-06-19 NOTE — LETTER
Letter by Ketty Pimentel CHW at      Author: Ketty Pimentel CHW Service: -- Author Type: --    Filed:  Encounter Date: 10/4/2019 Status: Signed         October 4, 2019            Levy Alba  [address] [facility] [address]        Dear Levy,                                                                       You were recently referred to the [facility] Clinic Care Coordination service.  This is a service offered through your Primary Care Clinic which can help you access resources, services in regard to your health and well-being goals. The clinic Community Health Worker has placed two calls to you to discuss the nature of this service and to offer enrollment.  If you are interested in learning more about Clinic Care Coordination, please call your Primary Care Clinic's Community Health Worker, Ketty, at [phone].                                                    Sincerely,                                                                              REMI Hdez                                                                                          Clinic Care Coordination                                                  [facility]                                Phone: [phone]

## 2021-06-19 NOTE — LETTER
Letter by Regino Gan MD at      Author: Regino Gan MD Service: -- Author Type: --    Filed:  Encounter Date: 5/14/2019 Status: (Other)         Levy Alba  [address] [facility] [address]             May 14, 2019         Dear Mr. Alba,    Below are the results from your recent visit:    Resulted Orders   Glycosylated Hemoglobin A1c   Result Value Ref Range    Hemoglobin A1c 6.2 (H) 3.5 - 6.0 %   Glucose   Result Value Ref Range    Glucose 128 (H) 70 - 125 mg/dL    Patient Fasting > 8hrs? Yes     Narrative    Fasting Glucose reference range is 70-99 mg/dL per  American Diabetes Association (ADA) guidelines.   Microalbumin, Random Urine   Result Value Ref Range    Microalbumin, Random Urine 0.69 0.00 - 1.99 mg/dL    Creatinine, Urine 48.7 mg/dL    Microalbumin/Creatinine Ratio Random Urine 14.2 <=19.9 mg/g    Narrative    Microalbumin, Random Urine  <2.0 mg/dL . . . . . . . . Normal  3.0-30.0 mg/dL . . . . . . Microalbuminuria  >30.0 mg/dL . . . . . .  . Clinical Proteinuria  Microalbumin/Creatinine Ratio, Random Urine  <20 mg/g . . . . .. . . . Normal   mg/g . . . . . . . Microalbuminuria  >300 mg/g . . . . . . . . Clinical Proteinuria   Lipid Cascade   Result Value Ref Range    Cholesterol 96 <=199 mg/dL    Triglycerides 125 <=149 mg/dL    HDL Cholesterol 37 (L) >=40 mg/dL    LDL Calculated 34 <=129 mg/dL    Patient Fasting > 8hrs? Yes    Urinalysis-UC if Indicated   Result Value Ref Range    Color, UA Yellow Colorless, Yellow, Straw, Light Yellow    Clarity, UA Clear Clear    Glucose, UA Negative Negative    Bilirubin, UA Negative Negative    Ketones, UA Negative Negative    Specific Gravity, UA 1.010 1.005 - 1.030    Blood, UA Negative Negative    pH, UA 5.5 5.0 - 8.0    Protein, UA Negative Negative mg/dL    Urobilinogen, UA 0.2 E.U./dL 0.2 E.U./dL, 1.0 E.U./dL    Nitrite, UA Negative Negative    Leukocytes, UA Small (!) Negative    Bacteria, UA None Seen None Seen hpf     RBC, UA None Seen None Seen, 0-2 hpf    WBC, UA 0-5 None Seen, 0-5 hpf    Squam Epithel, UA None Seen None Seen, 0-5 lpf    Narrative    Urine Culture ordered based on [facility] criteria   Culture, Urine   Result Value Ref Range    Culture No Growth        All very good results    Please call with questions or contact us using [employer].    Sincerely,        Electronically signed by Regino Gan MD

## 2021-06-19 NOTE — LETTER
Letter by Dianelys Cooper at      Author: Dianelys Cooper Service: -- Author Type: --    Filed:  Encounter Date: 9/11/2019 Status: (Other)         Levy Alba  [address] [facility] [address]      September 11, 2019      Dear Mr. Alba,    RE: Remote Results    We are writing to you regarding your recent Remote ICD check from home. Your transmission was received successfully. Battery status is satisfactory at this time.     Your results are within normal limits.    Your next device appointment will be a clinic visit.  Please call in mid October to schedule.      To schedule or reschedule, please call [phone] and press 1.    NOTE: If you would like to do an extra transmission, please call [phone] and press 3 to speak to a nurse BEFORE transmitting. This ensures that the Device Clinic staff is aware of the reason you are sending a transmission, and can follow-up with you after it has been reviewed.    We will be checking your implanted device from home (remotely) every three months unless otherwise instructed. We will need to see you in the clinic at least once a year. You may need to be seen in the clinic sooner depending on the results of your check.    Please be aware:    The follow-up schedule is like a Physician prescription.    Your remote monitor is paired to your specific implanted device.      Sincerely,    [facility]

## 2021-06-19 NOTE — LETTER
Letter by Regino Gan MD at      Author: Regino Gan MD Service: -- Author Type: --    Filed:  Encounter Date: 8/20/2019 Status: (Other)         Levy Alba  [address] [facility] [address]             August 20, 2019         Dear Mr. Alba,    Below are the results from your recent visit:    Resulted Orders   Glycosylated Hemoglobin A1c   Result Value Ref Range    Hemoglobin A1c 7.3 (H) 3.5 - 6.0 %   Glucose   Result Value Ref Range    Glucose 108 70 - 125 mg/dL    Patient Fasting > 8hrs? Unknown     Narrative    Fasting Glucose reference range is 70-99 mg/dL per  American Diabetes Association (ADA) guidelines.   Lipid Cascade   Result Value Ref Range    Cholesterol 135 <=199 mg/dL    Triglycerides 162 (H) <=149 mg/dL    HDL Cholesterol 41 >=40 mg/dL    LDL Calculated 62 <=129 mg/dL    Patient Fasting > 8hrs? Unknown        All very good results    Please call with questions or contact us using [email or web address].    Sincerely,        Electronically signed by Regino Gan MD

## 2021-06-20 NOTE — LETTER
Letter by Dianelys Cooper at      Author: Dianelys Cooper Service: -- Author Type: --    Filed:  Encounter Date: 1/24/2020 Status: (Other)         Levy Alba  [address] [facility] [address]      January 27, 2020      Dear Mr. LEONARDMike,    RE: Remote Results    We are writing to you regarding your recent Remote ICD check from home. Your transmission was received successfully. Battery status is satisfactory at this time.     Your results are within normal limits.    Your next device appointment will be a remote check on February 28, 2020; this will occur automatically.    To schedule or reschedule, please call [phone] and press 1.    NOTE: If you would like to do an extra transmission, please call [phone] and press 3 to speak to a nurse BEFORE transmitting. This ensures that the Device Clinic staff is aware of the reason you are sending a transmission, and can follow-up with you after it has been reviewed.    We will be checking your implanted device from home (remotely) every month unless otherwise instructed. We will need to see you in the clinic at least once a year. You may need to be seen in the clinic sooner depending on the results of your check.    Please be aware:    The follow-up schedule is like a Physician prescription.    Your remote monitor is paired to your specific implanted device.      Sincerely,    [facility]

## 2021-06-20 NOTE — LETTER
Letter by Dianelys Cooper at      Author: Dianelys Cooper Service: -- Author Type: --    Filed:  Encounter Date: 2/28/2020 Status: (Other)         Levy Alba  [address] [facility] [address]      March 2, 2020      Dear Mr. LEONARDBruno,    RE: Remote Results    We are writing to you regarding your recent Remote ICD check from home. Your transmission was received successfully. Battery status is satisfactory at this time.     Your results are showing no significant changes.    Your next device appointment will be a remote check on April 2, 2020; this will occur automatically.    To schedule or reschedule, please call [phone] and press 1.    NOTE: If you would like to do an extra transmission, please call [phone] and press 3 to speak to a nurse BEFORE transmitting. This ensures that the Device Clinic staff is aware of the reason you are sending a transmission, and can follow-up with you after it has been reviewed.    We will be checking your implanted device from home (remotely) every month unless otherwise instructed. We will need to see you in the clinic at least once a year. You may need to be seen in the clinic sooner depending on the results of your check.    Please be aware:    The follow-up schedule is like a Physician prescription.    Your remote monitor is paired to your specific implanted device.      Sincerely,    [facility]

## 2021-06-20 NOTE — LETTER
Letter by Regino Gan MD at      Author: Regino Gan MD Service: -- Author Type: --    Filed:  Encounter Date: 12/10/2019 Status: Signed         Levy Alba  [address] [facility] [address]             December 10, 2019         Dear Mr. Alba,    Below are the results from your recent visit:    Resulted Orders   Glycosylated Hemoglobin A1c   Result Value Ref Range    Hemoglobin A1c 6.4 (H) 3.5 - 6.0 %   Glucose   Result Value Ref Range    Glucose 151 (H) 70 - 125 mg/dL    Patient Fasting > 8hrs? Unknown     Narrative    Fasting Glucose reference range is 70-99 mg/dL per  American Diabetes Association (ADA) guidelines.   Lipid Cascade   Result Value Ref Range    Cholesterol 113 <=199 mg/dL    Triglycerides 161 (H) <=149 mg/dL    HDL Cholesterol 39 (L) >=40 mg/dL    LDL Calculated 42 <=129 mg/dL    Patient Fasting > 8hrs? Unknown        All very good results    Please call with questions or contact us using [employer].    Sincerely,        Electronically signed by Regino Gan MD

## 2021-06-20 NOTE — LETTER
Letter by Regino Gan MD at      Author: Regino Gan MD Service: -- Author Type: --    Filed:  Encounter Date: 5/7/2020 Status: (Other)         Levy Alba  [address] [facility] [address]             May 7, 2020         Dear Mr. Alba,    Below are the results from your recent visit:    Resulted Orders   Urinalysis-UC if Indicated   Result Value Ref Range    Color, UA Yellow Colorless, Yellow, Straw, Light Yellow    Clarity, UA Turbid (!) Clear    Glucose, UA Negative Negative    Bilirubin, UA Negative Negative    Ketones, UA Negative Negative    Specific Gravity, UA 1.021 1.001 - 1.030    Blood, UA Small (!) Negative    pH, UA 5.5 4.5 - 8.0    Protein, UA 50 mg/dL (!) Negative mg/dL    Urobilinogen, UA <2.0 E.U./dL <2.0 E.U./dL, 2.0 E.U./dL    Nitrite, UA Positive (!) Negative    Leukocytes, UA Large (!) Negative    Bacteria, UA Many (!) None Seen hpf    RBC, UA 0-2 None Seen, 0-2 hpf    WBC, UA >100 (!) None Seen, 0-5 hpf    Squam Epithel, UA 5-10 (!) None Seen, 0-5 lpf    WBC Clumps Present (!) None Seen    Amorphous, UA Moderate (!) None Seen    Mucus, UA Few (!) None Seen lpf    Ca Oxalate Violetta, UA Present (!) None Seen    Narrative    Urine Culture ordered based on [facility] criteria   Culture, Urine   Result Value Ref Range    Culture >100,000 col/ml Escherichia coli (!)     Culture >100,000 col/ml Escherichia coli (!)       Comment:      Second strain   C. Diff Toxin By PCR   Result Value Ref Range    C.Difficile Toxigenic by PCR Negative Negative    Ribotype 027/NAP1/B1 Presumptive Negative Presumptive Negative    Narrative    Intended use:     The [employer] Xpert  C. difficile/Epi Assay is a qualitative in vitro diagnostic test for rapid detection of toxin B gene sequences and for presumptive identification of 027/NAP1/BI strains of toxigenic Clostridium Difficile from unformed stool specimens collected from patients suspected of having C. difficile  infection (CDI). The Xpert C. difficile/Epi Assay is intended as an aid in the diagnosis of CDI. Detection of 027/NAP1/BI strains of C. difficile by the Xpert C. difficile/Epi Assay is presumptive and is solely for epidemiological purposes and is not intended to guide or monitor treatment for C. difficile infections. The [employer] Xpert C. difficile/Epi Assay is intended for use in hospital, reference or state laboratory settings.  The device is not intended for point-of-care use.       Methodology:     The [employer] Instrument Systems automate and integrate sample lysis, nucleic acid purification and amplification, and detection of the target sequence in simple or complex samples using real-time polymerase chain reaction (PCR). The [employer] Xpert  C. difficile/Epi assay detects the toxin B gene (tcdB), the binary toxin gene (CDT), and the single-base-pair deletion at nucleotide 117 within the gene encoding a negative regulator of toxin production (tcdC?117). Presumptive identification of 027/NAP1/BI strains of C. difficile is by detection of binary toxin (CDT) gene sequences and the single base pair deletion at nucleotide 117 in the tcdC gene. The tcdC gene encodes for a negative regulator in C. difficile toxin production. A fluorescent signal becomes detected and increases each time the specific DNA strand is amplified. The Real-time PCR generates a growth curve with number of cycles on the x-axis and fluorescence on the y-axis. If the organism's DNA is not detected by the real-time  PCR reaction the growth curve will be flat and will be resulted as negative.         All very good results please call patient and family and tell them allCLEAR no sign of C. difficile toxin colitis.  Or diarrhea.     Please call with questions or contact us using [employer].    Sincerely,        Electronically signed by Regino Gan MD

## 2021-06-20 NOTE — LETTER
Letter by Regino Gan MD at      Author: Regino Gan MD Service: -- Author Type: --    Filed:  Encounter Date: 5/7/2020 Status: (Other)         Levy Alba  [address] [facility] [address]             May 7, 2020         Dear Mr. Alba,    Below are the results from your recent visit:    Resulted Orders   Urinalysis-UC if Indicated   Result Value Ref Range    Color, UA Yellow Colorless, Yellow, Straw, Light Yellow    Clarity, UA Turbid (!) Clear    Glucose, UA Negative Negative    Bilirubin, UA Negative Negative    Ketones, UA Negative Negative    Specific Gravity, UA 1.021 1.001 - 1.030    Blood, UA Small (!) Negative    pH, UA 5.5 4.5 - 8.0    Protein, UA 50 mg/dL (!) Negative mg/dL    Urobilinogen, UA <2.0 E.U./dL <2.0 E.U./dL, 2.0 E.U./dL    Nitrite, UA Positive (!) Negative    Leukocytes, UA Large (!) Negative    Bacteria, UA Many (!) None Seen hpf    RBC, UA 0-2 None Seen, 0-2 hpf    WBC, UA >100 (!) None Seen, 0-5 hpf    Squam Epithel, UA 5-10 (!) None Seen, 0-5 lpf    WBC Clumps Present (!) None Seen    Amorphous, UA Moderate (!) None Seen    Mucus, UA Few (!) None Seen lpf    Ca Oxalate Violetta, UA Present (!) None Seen    Narrative    Urine Culture ordered based on [facility] criteria   Culture, Urine   Result Value Ref Range    Culture >100,000 col/ml Escherichia coli (!)     Culture >100,000 col/ml Escherichia coli (!)       Comment:      Second strain       Needs Keflex 500 mg number 30 tablets take 1 tablet 3 times a day for this urinary tract infection with 2 E. coli organisms labeled #1 and #2 with 1 additional refill.     Please call with questions or contact us using [employer].    Sincerely,        Electronically signed by Regino Gan MD

## 2021-06-21 NOTE — LETTER
Letter by Saurabh Blake MD at      Author: Saurabh Blake MD Service: -- Author Type: --    Filed:  Encounter Date: 5/20/2021 Status: (Other)         Levy Alba  [address] [facility] [address]      May 20, 2021      Dear Levy,    This letter is to remind you that you are due for your follow up appointment with Dr. Saurabh Blake with Device Check  . To help ensure you are in the best health possible, a regular follow-up with your cardiologist is essential.     Please call our Patient Scheduling Line at [phone] to schedule your appointment at your earliest convenience.  If you have recently scheduled an appointment, please disregard this letter.    We look forward to seeing you again. As always, we are available at the number  above for any questions or concerns you may have.      Sincerely,     The Physicians and Staff of [facility]

## 2021-06-21 NOTE — LETTER
Letter by Saurabh Blake MD at      Author: Saurabh Blake MD Service: -- Author Type: --    Filed:  Encounter Date: 5/20/2021 Status: (Other)         Levy Alba  [address] [facility] [address]      May 20, 2021      Dear Levy,    This letter is to remind you that you are due for your follow up appointment with Dr. Saurabh Blake with Device Check . To help ensure you are in the best health possible, a regular follow-up with your cardiologist is essential.     Please call our Patient Scheduling Line at [phone] to schedule your appointment at your earliest convenience.  If you have recently scheduled an appointment, please disregard this letter.    We look forward to seeing you again. As always, we are available at the number  above for any questions or concerns you may have.      Sincerely,     The Physicians and Staff of [facility]

## 2021-06-22 NOTE — PROGRESS NOTES
[facility] [facility] Heart Care Note    Assessment:  1. History of clinical ventricular tachycardia. Ventricular tachycardia appears to originate in the inferior left ventricular region, and probably related to previous inferior posterior LV scar.   2. Dual-chamber ICD implanted 03/19/2013 -- secondary indication:. [employer]   3. Coronary artery disease with remote inferior infarct and bypass surgery.        Stress nuclear scan 10/03/2013 showed fixed inferolateral defect         Echocardiogram November 11 2015 shows fixed inferior posterior regional wall motion abnormality with ejection fraction-unchanged from 2013.   4. Neurologic problems with ataxia, dizziness, resolved upon termination of amiodarone.  Occasions of dizziness while walking-no evidence for postural hypotension  5. Transient ischemic attack, 03/2014. At that time, carotid ultrasound showed minimal disease and there was no evidence on CT scan of a stroke.  Brief bursts of atrial arrhythmia lasting less than 1 minute    Plan:      Pacemaker defibrillator assessment today is excellent  Battery should last another 1-2 years  When there is indicators  of low battery, we would look at doing a generator replacemen-this could be done as outpatient; leads look excellent  Heart rhythm is stable  Continue current medications  Have device check through transtelephonic monitoring every 3 months  He should have no cardiac or device  issues with your upcoming eye surgery  Follow up in 1 year      Subjective:    I had the opportunity to see.Levy Alba , who is a 78 y.o. male with a known history of coronary artery disease  He has been having trouble with his eyes, has erythema and irritation of his lower eyelids and is scheduled for surgery in the near future.  We talked about his heart and device, they should not be a factor in his upcoming surgery  Heart wise he is doing well,   his gait is unsteady, , does not like to use a walker because he tripped while using it  once, mostly uses a cane  No angina  No awareness of palpitations or arrhythmias no syncopal episodes  Denies orthopnea PND or pedal edema-fluid levels seem well controlled  He had comprehensive blood testing November 28, 2018  Electrolytes normal  Creatinine 1.08  Hemoglobin 13.4            Problem List:  Patient Active Problem List   Diagnosis     Chest Pain     Postherpetic Neuralgia     Sebaceous Cyst     Influenza     Vasovagal Syncope     Acute Myocardial Infarction     Paroxysmal Ventricular Tachycardia     Dizziness     Type 2 diabetes mellitus with diabetic neuropathy (H)     Hypercholesterolemia     Transient Ischemic Attack     Lumbar Radiculopathy     CAD (coronary artery disease)     ICD (implantable cardioverter-defibrillator), dual, in situ     HTN (hypertension)     Squamous cell carcinoma of left hand     Squamous cell carcinoma of skin of left upper arm     Squamous cell carcinoma of skin of right temple     Squamous cell carcinoma, face     Imbalance     Gait disturbance     Ectropion of both lower eyelids     Medical History:  Past Medical History:   Diagnosis Date     Acute myocardial infarction (H)      CAD (coronary artery disease)      DM2 (diabetes mellitus, type 2) (H)      HLD (hyperlipidemia)      ICD (implantable cardioverter-defibrillator), dual, in situ      TIA (transient ischemic attack)      Surgical History:  Past Surgical History:   Procedure Laterality Date     cardiac bypass       CORONARY ANGIOPLASTY WITH STENT PLACEMENT       Social History:  Social History     Socioeconomic History     Marital status:      Spouse name: Not on file     Number of children: Not on file     Years of education: Not on file     Highest education level: Not on file   Social Needs     Financial resource strain: Not on file     Food insecurity - worry: Not on file     Food insecurity - inability: Not on file     Transportation needs - medical: Not on file     Transportation needs - non-medical:  Not on file   Occupational History     Not on file   Tobacco Use     Smoking status: Former Smoker     Packs/day: 2.00     Years: 37.00     Pack years: 74.00     Last attempt to quit: 1985     Years since quittin.9     Smokeless tobacco: Never Used   Substance and Sexual Activity     Alcohol use: No     Drug use: No     Sexual activity: Not on file   Other Topics Concern     Not on file   Social History Narrative     Not on file       Review of Systems:      General: WNL  Eyes: WNL  Ears/Nose/Throat: WNL  Lungs: WNL  Heart: WNL  Stomach: WNL  Bladder: WNL  Muscle/Joints: Muscle Weakness  Skin: WNL  Nervous System: Falls, Dizziness, Loss of Balance  Mental Health: WNL     Blood: Easy Bruising        Family History:  Family History   Adopted: Yes         Allergies:  Allergies   Allergen Reactions     Amiodarone      neuropathy     Lisinopril Cough     Niacin Unknown     Penicillins Hives       Medications:  Current Outpatient Medications   Medication Sig Dispense Refill     aspirin 81 mg chewable tablet Chew 81 mg daily.       atorvastatin (LIPITOR) 20 MG tablet TAKE 1 TABLET (20 MG) BY ORAL ROUTE ONCE DAILY 90 tablet 3     blood glucose test (CONTOUR TEST STRIPS) strips TEST 2 TIMES DAILY AS DIRECTED(E119). 200 strip 3     cholecalciferol, vitamin D3, 1,000 unit tablet Take 1,000 Units by mouth daily.        fluticasone (FLONASE) 50 mcg/actuation nasal spray 2 sprays into each nostril daily. 16 g 12     glimepiride (AMARYL) 4 MG tablet TAKE 1 TABLET (4 MG TOTAL) BY MOUTH 2 (TWO) TIMES A  tablet 3     losartan (COZAAR) 25 MG tablet Take 25 mg by mouth daily.       metFORMIN (GLUCOPHAGE) 1000 MG tablet Take 1 tablet (1,000 mg total) by mouth 2 (two) times a day. 180 tablet 3     metoprolol tartrate (LOPRESSOR) 50 MG tablet TAKE 1 TABLET EVERY DAY AT BEDTIME 90 tablet 0     multivitamin therapeutic tablet Take 1 tablet by mouth daily.       omega-3 fatty acids-vitamin E (FISH OIL) 1,000 mg cap Take 1  "capsule by mouth daily.       pyridoxine, vitamin B6, (VITAMIN B-6) 100 MG tablet Take 100 mg by mouth daily.       tamsulosin (FLOMAX) 0.4 mg cap Take 1 capsule (0.4 mg total) by mouth 2 (two) times a day. 180 capsule 3     No current facility-administered medications for this visit.          Surgical site  Nicely is well-healed to left subclavicular area; he has irregular skin in that area rough and crusty but no erythema or signs of infection    Device interrogation  Intrinsic rhythm is sinus bradycardia  66% atrial pacing  Very little ventricular pacing  Minimal atrial arrhythmias none lasting minutes  Minimal ventricular arrhythmias including a few short bursts of nonsustained ventricular tachycardia  Lead function satisfactory  Battery voltage is 0.88 and we expect generator battery to last another 14-17 months    Objective:   Vital signs:  /70 (Patient Site: Left Arm, Patient Position: Sitting, Cuff Size: Adult Large)   Pulse 68   Resp 18   Ht 5' 10\" (1.778 m)   Wt 162 lb (73.5 kg)   BMI 23.24 kg/m        Physical Exam:  Right ear is weepy  Lower eyelids are very erythematous and inflamed     GENERAL APPEARANCE: Alert, cooperative and in no acute distress.  HEENT: No scleral icterus. No Xanthelasma. Oral mucous membranes pink and moist.  NECK: JVP  Normal cm. No Hepatojugular reflux. Thyroid not  Palpable  CHEST: clear to auscultation and percussion  CARDIOVASCULAR: S1, S2 without murmur    Brachial, radial  pulses are intact and symmetric.   No carotid bruits noted.  ABDOMEN: Non tender. BS+. No bruits.  EXTREMITIES: No cyanosis, clubbing or edema.    Lab Results:  LIPIDS:  Lab Results   Component Value Date    CHOL 122 11/13/2017    CHOL 123 05/15/2015    CHOL 122 03/12/2014     Lab Results   Component Value Date    HDL 38 (L) 11/13/2017    HDL 39 (L) 05/15/2015    HDL 33 (L) 03/12/2014     Lab Results   Component Value Date    LDLCALC 42 11/13/2017    LDLCALC 42 05/15/2015    LDLCALC 56 " 03/12/2014     Lab Results   Component Value Date    TRIG 209 (H) 11/13/2017    TRIG 209 (H) 05/15/2015    TRIG 166 (H) 03/12/2014     No components found for: CHOLHDL    BMP:  Lab Results   Component Value Date    CREATININE 1.08 11/28/2018    BUN 15 11/28/2018     11/28/2018    K 4.5 11/28/2018     11/28/2018    CO2 27 11/28/2018         This note has been dictated using voice recognition software. Any grammatical or context distortions are unintentional and inherent to the software.  Saurabh Blake MD  [facility]  [phone]

## 2021-06-22 NOTE — PROGRESS NOTES
Preoperative Exam    Scheduled Procedure: Right Eye Repair  Surgery Date:  12-  Surgery Location: [facility] - fax [phone]    Surgeon:  Dr Garnica    Assessment/Plan:     1. Controlled type 2 diabetes mellitus without complication, without long-term current use of insulin (H)  He was told to hold his Amaryl and metformin on the morning of surgery.  - Basic Metabolic Panel  - HM2(CBC w/o Differential)    2. Coronary artery disease involving native heart without angina pectoris, unspecified vessel or lesion type  No current angina.  Previous history of angioplasty with stent placement 2013 and coronary artery bypass surgery previously in 1996.  He will hold his aspirin therapy for 7 days prior to surgery.    3. ICD (implantable cardioverter-defibrillator), dual, in situ placed for a history of syncope with ventricular tachycardia.  Last echocardiogram done in 11/2017 showed a ejection fraction of the left ventricle at 35-40% he has no clinical symptoms of CHF.  He gets regular device checks and defibrillator is functioning normally.    4. Senile ectropion of both lower eyelids  Surgery is planned for his right eye first which has a associated warty growth on the lower eyelid with left lower eyelid to be done at a later date.    5. Type 2 diabetes mellitus with diabetic neuropathy, without long-term current use of insulin (H)  Last A1c done on 5/4/2018 was 6.8 indicating excellent control.  He is frustrated with his chronic numbness in his feet due to neuropathy.  He has seen neurology recently and he does not need any medications for neuropathy at this time.  He has no neuropathic pain    6. Essential hypertension  Blood pressure 122/60 today pulse 65  Surgical Procedure Risk: Low (reported cardiac risk generally < 1%)  Have you had prior anesthesia?: Yes  Have you or any family members had a previous anesthesia reaction:  No  Do you or any family members have a history of a clotting or bleeding  disorder?: No  Cardiac Risk Assessment: no increased risk for major cardiac complications    Patient approved for surgery with general or local anesthesia.        Functional Status: Independent  Patient plans to recover at home with family.     Subjective:      Levy Alba is a 78 y.o. male who presents for a preoperative consultation.  He has bilateral ectropion of his lower eyelids.  These been present for several years.  They are progressively more irritating.  He also now has a warty growth on his right lower eyelid that needs to be removed.  Surgery is recommended by his ophthalmologist.  His past medical history is reviewed as above.  He is done quite well in recent years from a cardiac standpoint.  He had a stent placed in 2013 and prior to that had bypass surgery in 1996.  He has a history of ventricular tachycardia with subsequent placement of an ICD biventricular pacemaker.  He is not had the pacemaker fire ever since placement.  He has underlying diabetes with diabetic neuropathy.  Is frustrated with the severity of his neuropathy.  He has numb feet chronically.  He has no neuropathic pain.  He has seen neurologist Dr. Valdez battery of tests were done to see if there is any other cause for his neuropathy.  His neuropathy is felt to be due to his diabetes.  We are keeping his diabetes under good control with A1c is less than 7.    All other systems reviewed and are negative, other than those listed in the HPI.    Pertinent History  Do you have difficulty breathing or chest pain after walking up a flight of stairs: No  History of obstructive sleep apnea: No  Steroid use in the last 6 months: No  Frequent Aspirin/NSAID use: Yes: ASA 81mg daily  Prior Blood Transfusion: No  Prior Blood Transfusion Reaction: No  If for some reason prior to, during or after the procedure, if it is medically indicated, would you be willing to have a blood transfusion?:  There is no transfusion refusal.    Current Outpatient  Medications   Medication Sig Dispense Refill     aspirin 81 mg chewable tablet Chew 81 mg daily.       atorvastatin (LIPITOR) 20 MG tablet TAKE 1 TABLET (20 MG) BY ORAL ROUTE ONCE DAILY 90 tablet 3     cholecalciferol, vitamin D3, 1,000 unit tablet Take 1,000 Units by mouth daily.        CONTOUR TEST STRIPS strips TEST 2 TIMES DAILY AS DIRECTED(E119) 200 strip 3     fluticasone (FLONASE) 50 mcg/actuation nasal spray 2 sprays into each nostril daily. 16 g 12     glimepiride (AMARYL) 4 MG tablet TAKE 1 TABLET (4 MG TOTAL) BY MOUTH 2 (TWO) TIMES A  tablet 3     metFORMIN (GLUCOPHAGE) 1000 MG tablet Take 1 tablet (1,000 mg total) by mouth 2 (two) times a day. 180 tablet 3     metoprolol tartrate (LOPRESSOR) 50 MG tablet Take 50 mg by mouth at bedtime.       multivitamin therapeutic tablet Take 1 tablet by mouth daily.       omega-3 fatty acids-vitamin E (FISH OIL) 1,000 mg cap Take 1 capsule by mouth daily.       pyridoxine, vitamin B6, (VITAMIN B-6) 100 MG tablet Take 100 mg by mouth daily.       tamsulosin (FLOMAX) 0.4 mg cap Take 1 capsule (0.4 mg total) by mouth 2 (two) times a day. 180 capsule 3     No current facility-administered medications for this visit.         Allergies   Allergen Reactions     Amiodarone      neuropathy     Lisinopril Cough     Niacin Unknown     Penicillins Hives       Patient Active Problem List   Diagnosis     Chest Pain     Postherpetic Neuralgia     Sebaceous Cyst     Influenza     Vasovagal Syncope     Acute Myocardial Infarction     Paroxysmal Ventricular Tachycardia     Dizziness     Type 2 diabetes mellitus with diabetic neuropathy (H)     Hypercholesterolemia     Transient Ischemic Attack     Lumbar Radiculopathy     CAD (coronary artery disease)     ICD (implantable cardioverter-defibrillator), dual, in situ     HTN (hypertension)     Squamous cell carcinoma of left hand     Squamous cell carcinoma of skin of left upper arm     Squamous cell carcinoma of skin of right  "temple     Squamous cell carcinoma, face     Imbalance     Gait disturbance       Past Medical History:   Diagnosis Date     Acute myocardial infarction (H)      CAD (coronary artery disease)      DM2 (diabetes mellitus, type 2) (H)      HLD (hyperlipidemia)      ICD (implantable cardioverter-defibrillator), dual, in situ      TIA (transient ischemic attack)        Past Surgical History:   Procedure Laterality Date     cardiac bypass       CORONARY ANGIOPLASTY WITH STENT PLACEMENT         Social History     Socioeconomic History     Marital status:      Spouse name: Not on file     Number of children: Not on file     Years of education: Not on file     Highest education level: Not on file   Social Needs     Financial resource strain: Not on file     Food insecurity - worry: Not on file     Food insecurity - inability: Not on file     Transportation needs - medical: Not on file     Transportation needs - non-medical: Not on file   Occupational History     Not on file   Tobacco Use     Smoking status: Former Smoker     Packs/day: 2.00     Years: 37.00     Pack years: 74.00     Last attempt to quit: 1985     Years since quittin.9     Smokeless tobacco: Never Used   Substance and Sexual Activity     Alcohol use: No     Drug use: No     Sexual activity: Not on file   Other Topics Concern     Not on file   Social History Narrative     Not on file             Objective:     Vitals:    18 1523   BP: 122/60   Pulse: 65   SpO2: 98%   Weight: 162 lb (73.5 kg)   Height: 5' 10\" (1.778 m)         Physical Exam:      There are no Patient Instructions on file for this visit.        Labs:  Recent Results (from the past 24 hour(s))   HM2(CBC w/o Differential)    Collection Time: 18  4:36 PM   Result Value Ref Range    WBC 6.6 4.0 - 11.0 thou/uL    RBC 4.11 (L) 4.40 - 6.20 mill/uL    Hemoglobin 13.4 (L) 14.0 - 18.0 g/dL    Hematocrit 39.7 (L) 40.0 - 54.0 %    MCV 96 80 - 100 fL    MCH 32.5 27.0 - 34.0 pg    " MCHC 33.7 32.0 - 36.0 g/dL    RDW 11.2 11.0 - 14.5 %    Platelets 235 140 - 440 thou/uL    MPV 8.1 7.0 - 10.0 fL     BMP is pending    Immunization History   Administered Date(s) Administered     Influenza [id number], 08/15/2016     Influenza high dose, seasonal 08/31/2014, 09/11/2015, 08/30/2017     Influenza, inj, historic,unspecified 11/02/2007, 01/06/2010     Influenza, seasonal,quad inj 6-35 mos 09/04/2013     Pneumo Conj 13-V (2010&after) 05/27/2015     Pneumo Polysac 23-V 08/03/2005, 01/11/2013     Td,adult,historic,unspecified 1939     Tdap 09/05/2011, 06/16/2016     ZOSTER, LIVE 01/11/2013           Electronically signed by Flakito Morton MD 11/28/18 3:25 PM

## 2021-06-22 NOTE — ANESTHESIA PREPROCEDURE EVALUATION
Anesthesia Evaluation      Patient summary reviewed   History of anesthetic complications     Airway   Mallampati: II  Neck ROM: full   Pulmonary - normal exam                          Cardiovascular - normal exam  (+) pacemaker, hypertension, CAD, dysrhythmias, ,      Neuro/Psych    (+) CVA ,     Endo/Other    (+) diabetes mellitus,      GI/Hepatic/Renal            Dental    (+) upper dentures and bridge                       Anesthesia Plan  Planned anesthetic: MAC    ASA 3     Anesthetic plan and risks discussed with: patient    Post-op plan: routine recovery

## 2021-06-22 NOTE — ANESTHESIA POSTPROCEDURE EVALUATION
Patient: Levy Alba  RIGHT INGUINAL HERNIA REPAIR WITH MESH  Anesthesia type: MAC    Patient location: Phase II Recovery  Last vitals:   Vitals:    01/02/19 1315   BP:    Pulse:    Resp: 16   Temp:    SpO2:      Post vital signs: stable  Level of consciousness: awake and responds to simple questions  Post-anesthesia pain: pain controlled  Post-anesthesia nausea and vomiting: no  Pulmonary: unassisted, return to baseline  Cardiovascular: stable and blood pressure at baseline  Hydration: adequate  Anesthetic events: no    QCDR Measures:  ASA# 11 - Clarissa-op Cardiac Arrest: ASA11B - Patient did NOT experience unanticipated cardiac arrest  ASA# 12 - Clarissa-op Mortality Rate: ASA12B - Patient did NOT die  ASA# 13 - PACU Re-Intubation Rate: ASA13B - Patient did NOT require a new airway mgmt  ASA# 10 - Composite Anes Safety: ASA10A - No serious adverse event    Additional Notes:

## 2021-06-22 NOTE — PROGRESS NOTES
In clinic device check with Device RN and Dr. Blake  Please see link for full device report.  Patient was informed of results and next follow up during today's visit.    Vanna Velasco RN BSN  [facility]

## 2021-06-22 NOTE — PROGRESS NOTES
Assessment and Plan:   Annual wellness visit and physical exam.    No lunch today 8 at 730 this morning.    78-year-old male.  Accompanied by daughter.  New patient to this examiner.    1. Routine general medical examination at a health care facility  Annual wellness visit and physical exam.  - HM2(CBC w/o Differential)  - Comprehensive Metabolic Panel  - Lipid Cascade  - Glycosylated Hemoglobin A1c  - Thyroid Stimulating Hormone (TSH)  - Urinalysis-UC if Indicated    2. Screening for prostate cancer  Annual wellness visit and physical exam.  - PSA (Prostatic-Specific Antigen), Annual Screen     The patient's current medical problems were reviewed.    I have had an Advance Directives discussion with the patient.  The following health maintenance schedule was reviewed with the patient and provided in printed form in the after visit summary:   Health Maintenance   Topic Date Due     DIABETES OPHTHALMOLOGY EXAM  01/13/1950     ZOSTER VACCINES (2 of 3) 03/08/2013     DIABETES URINE MICROALBUMIN  05/27/2016     DIABETES HEMOGLOBIN A1C  11/04/2018     DIABETES FOLLOW-UP  06/11/2019     DIABETES FOOT EXAM  11/28/2019     FALL RISK ASSESSMENT  11/28/2019     ADVANCE DIRECTIVES DISCUSSED WITH PATIENT  05/27/2020     TD 18+ HE  06/16/2026     PNEUMOCOCCAL POLYSACCHARIDE VACCINE AGE 65 AND OVER  Completed     INFLUENZA VACCINE RULE BASED  Completed     PNEUMOCOCCAL CONJUGATE VACCINE FOR ADULTS (PCV13 OR PREVNAR)  Completed        Subjective:   Chief Complaint: Levy Alba is an 78 y.o. male here for an Annual Wellness visit.   HPI: Annual wellness visit and physical exam for this 78-year-old male.  Has bilateral ectropion and surgery contemplated for same.    Neuropathy both legs.    Lower eyelids drain and are irritated.    Balance and issue the patient uses a cane the patient has had no recent falls or loss of consciousness.  He has completed 6 sessions of physical therapy which she did not find that productive.   Daughter accompanies him here today and wonders if more should be ordered.    Non-smoker no alcohol an allergy to penicillin plus allergy to amiodarone lisinopril and niacin.    Prior history of coronary artery disease inclusive of 7 myocardial infarctions with history of angioplasty and stent placement in  and coronary bypass grafting in .    History of ICD defibrillator and pacemaker with ventricular tachycardia.  Impaired ejection fraction seen on echocardiogram of 2000 1735-40%.  Does not have symptoms of volume overload and denies PND.  Senile ectropion both lower lids.    History of hypertension with complicating hyperlipidemia and diabetes mellitus type 2.    Multiple prior coronary stents.  Prior blepharoplasty her eye surgery planned.    Hyperlipidemia hypertension and type 2 diabetes.    The patient himself is adopted.  His adopted parents are both  mother dying at age 58 father dying at age 59.    The patient's wife is still living he is  and father to 4 children and has 5 grandchildren.  All of whom are well including his wife.    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    Review of Systems:    Please see above.  The rest of the review of systems are negative for all systems.    Patient Care Team:  Flakito Morton MD as PCP - General     Patient Active Problem List   Diagnosis     Chest Pain     Postherpetic Neuralgia     Sebaceous Cyst     Influenza     Vasovagal Syncope     Acute Myocardial Infarction     Paroxysmal Ventricular Tachycardia     Dizziness     Type 2 diabetes mellitus with diabetic neuropathy (H)     Hypercholesterolemia     Transient Ischemic Attack     Lumbar Radiculopathy     CAD (coronary artery disease)     ICD (implantable cardioverter-defibrillator), dual, in situ     HTN (hypertension)     Squamous cell carcinoma of left hand     Squamous cell carcinoma of skin of left upper arm     Squamous cell carcinoma of skin of right  temple     Squamous cell carcinoma, face     Imbalance     Gait disturbance     Ectropion of both lower eyelids     Past Medical History:   Diagnosis Date     Acute myocardial infarction (H)      CAD (coronary artery disease)      DM2 (diabetes mellitus, type 2) (H)      HLD (hyperlipidemia)      ICD (implantable cardioverter-defibrillator), dual, in situ      TIA (transient ischemic attack)       Past Surgical History:   Procedure Laterality Date     cardiac bypass       CORONARY ANGIOPLASTY WITH STENT PLACEMENT        Family History   Adopted: Yes      Social History     Socioeconomic History     Marital status:      Spouse name: Not on file     Number of children: Not on file     Years of education: Not on file     Highest education level: Not on file   Social Needs     Financial resource strain: Not on file     Food insecurity - worry: Not on file     Food insecurity - inability: Not on file     Transportation needs - medical: Not on file     Transportation needs - non-medical: Not on file   Occupational History     Not on file   Tobacco Use     Smoking status: Former Smoker     Packs/day: 2.00     Years: 37.00     Pack years: 74.00     Last attempt to quit: 1985     Years since quittin.9     Smokeless tobacco: Never Used   Substance and Sexual Activity     Alcohol use: No     Drug use: No     Sexual activity: Not on file   Other Topics Concern     Not on file   Social History Narrative     Not on file      Current Outpatient Medications   Medication Sig Dispense Refill     aspirin 81 mg chewable tablet Chew 81 mg daily.       atorvastatin (LIPITOR) 20 MG tablet TAKE 1 TABLET (20 MG) BY ORAL ROUTE ONCE DAILY 90 tablet 3     cholecalciferol, vitamin D3, 1,000 unit tablet Take 1,000 Units by mouth daily.        glimepiride (AMARYL) 4 MG tablet TAKE 1 TABLET (4 MG TOTAL) BY MOUTH 2 (TWO) TIMES A  tablet 3     losartan (COZAAR) 25 MG tablet Take 25 mg by mouth daily.       metFORMIN  "(GLUCOPHAGE) 1000 MG tablet Take 1 tablet (1,000 mg total) by mouth 2 (two) times a day. 180 tablet 3     metoprolol tartrate (LOPRESSOR) 50 MG tablet TAKE 1 TABLET EVERY DAY AT BEDTIME 90 tablet 0     multivitamin therapeutic tablet Take 1 tablet by mouth daily.       omega-3 fatty acids-vitamin E (FISH OIL) 1,000 mg cap Take 1 capsule by mouth daily.       pyridoxine, vitamin B6, (VITAMIN B-6) 100 MG tablet Take 100 mg by mouth daily.       tamsulosin (FLOMAX) 0.4 mg cap Take 1 capsule (0.4 mg total) by mouth 2 (two) times a day. 180 capsule 3     blood glucose test (CONTOUR TEST STRIPS) strips TEST 2 TIMES DAILY AS DIRECTED(E119). 200 strip 3     fluticasone (FLONASE) 50 mcg/actuation nasal spray 2 sprays into each nostril daily. 16 g 12     No current facility-administered medications for this visit.       Objective:   Vital Signs:   Visit Vitals  /56 (Patient Site: Right Arm, Patient Position: Sitting)   Pulse 63   Ht 5' 10\" (1.778 m)   Wt 159 lb (72.1 kg)   SpO2 99%   BMI 22.81 kg/m         VisionScreening:  No exam data present     PHYSICAL EXAM  Chest clear to auscultation and percussion.  Heart tones regular rhythm without murmur rub or gallop.  Abdomen soft nontender no organomegaly.  No peritoneal signs.  Extremities free of edema cyanosis or clubbing.  Neck veins nondistended no thyromegaly or scleral icterus noted, carotids full.  Skin warm and dry easily conversant good spirited.  Normal intelligence.  Neurologically intact no gross localizing findings.  Bilateral lower lid ectropion.  HEENT otherwise negative gait is unsteady skin is fair.  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    His prostate is enlarged but smooth no nodularity 3/4 without nodularity no sign of cancer.  HEENT negative except for bilateral lower lid ectropion needs surgical repair uses a cane for ambulation and wears glasses.    There is a large right groin hernia that appears to be reducible but it " is big scrotal contents testicles were normal bilaterally.  Good pulse noted in all 4 extremities no carotid bruits or thyromegaly.  Skin is fair pale not in acute distress not toxic.  Easily conversant good spirited he has had frequent falls and his balance is definitely in jeopardy he has had frequent falls but no head trauma no loss of consciousness.  RTC 3 months Dr. Levy Palma to see regarding right groin hernia.    Assessment Results 12/11/2018   Activities of Daily Living 1 - Full function   Instrumental Activities of Daily Living No help needed   Get Up and Go Score 12 seconds or more   Mini Cog Total Score 0   Some recent data might be hidden     A Mini-Cog score of 0-2 suggests the possibility of dementia, score of 3-5 suggests no dementia    Identified Health Risks:     He is at risk for lack of exercise and has been provided with information to increase physical activity for the benefit of his well-being.  The patient was counseled and encouraged to consider modifying their diet and eating habits. He was provided with information on recommended healthy diet options.  The patient reports that he has difficulty with activities of daily living. I have asked that the patient make a follow up appointment in 4 weeks where this issue will be further evaluated and addressed.  The patient was provided with written information regarding signs of hearing loss.  He is at risk for falling and has been provided with information to reduce the risk of falling at home.  Patient's advanced directive was discussed and I am comfortable with the patient's wishes.        The patient was provided with appropriate referrals to address his memory problem.

## 2021-06-23 NOTE — PROGRESS NOTES
Office Visit - Follow up    Levy Alba   79 y.o. male    Date of Visit: 1/22/2019    Chief Complaint   Patient presents with     Fall     fell Saturday  2nd recent fall     Gait Problem     balance issues     Urinary Frequency     and incontinence no pain       Subjective: Diabetes mellitus type 2 with underlying coronary artery disease.    Follow-up right inguinal hernia repair went well.  2 recent falls problems with balance history of peripheral neuropathy and shuffling gait is described.  Sometimes the gait is fast or quickened.  He is accompanied today by his daughter Aneta who relates most of the history.    Urinary frequency and incontinence with no pain.  Wants something more than tamsulosin.  Previously had been on 2 tamsulosin each day 0.4 mg each.  His balance is off.    No recent head trauma prior history of stroke.    No blood in stool or urine medication list reviewed well-tolerated normal effects.    ROS: A comprehensive review of systems was performed and was otherwise negative    Medications:  Prior to Admission medications    Medication Sig Start Date End Date Taking? Authorizing Provider   aspirin 81 mg chewable tablet Chew 81 mg daily.   Yes Flakito Morton MD   atorvastatin (LIPITOR) 20 MG tablet TAKE 1 TABLET (20 MG) BY ORAL ROUTE ONCE DAILY 2/28/18  Yes Regino Gan MD   cholecalciferol, vitamin D3, 1,000 unit tablet Take 1,000 Units by mouth daily.    Yes Flakito Morton MD   glimepiride (AMARYL) 4 MG tablet TAKE 1 TABLET (4 MG TOTAL) BY MOUTH 2 (TWO) TIMES A DAY 4/24/18  Yes Flakito Morton MD   losartan (COZAAR) 25 MG tablet Take 25 mg by mouth daily.   Yes PROVIDER, HISTORICAL   metFORMIN (GLUCOPHAGE) 1000 MG tablet Take 1 tablet (1,000 mg total) by mouth 2 (two) times a day. 6/18/18  Yes Flakito Morton MD   metoprolol tartrate (LOPRESSOR) 50 MG tablet TAKE 1 TABLET EVERY DAY AT BEDTIME 12/3/18  Yes Saurabh Blake MD   multivitamin therapeutic  tablet Take 1 tablet by mouth daily.   Yes PROVIDER, HISTORICAL   omega-3 fatty acids-vitamin E (FISH OIL) 1,000 mg cap Take 1 capsule by mouth daily.   Yes PROVIDER, HISTORICAL   pyridoxine, vitamin B6, (VITAMIN B-6) 100 MG tablet Take 100 mg by mouth daily.   Yes PROVIDER, HISTORICAL   tamsulosin (FLOMAX) 0.4 mg cap Take 1 capsule (0.4 mg total) by mouth 2 (two) times a day.  Patient taking differently: Take 0.4 mg by mouth daily.        7/26/18  Yes Flakito Morton MD   blood glucose test (CONTOUR TEST STRIPS) strips TEST 2 TIMES DAILY AS DIRECTED(E119). 12/6/18   Flakito Morton MD   HYDROcodone-acetaminophen 5-325 mg per tablet Take 1 tablet by mouth every 4 (four) hours as needed for pain. 1/2/19 1/22/19  Levy Duff MD       Allergies:   Allergies   Allergen Reactions     Amiodarone      neuropathy     Lisinopril Cough     Niacin Unknown     Penicillins Hives       Immunizations:   Immunization History   Administered Date(s) Administered     Influenza T6t5-28, 08/15/2016     Influenza high dose, seasonal 08/31/2014, 09/11/2015, 08/30/2017     Influenza, inj, historic,unspecified 11/02/2007, 01/06/2010     Influenza, seasonal,quad inj 6-35 mos 09/04/2013     Pneumo Conj 13-V (2010&after) 05/27/2015     Pneumo Polysac 23-V 08/03/2005, 01/11/2013     Td,adult,historic,unspecified 1939     Tdap 09/05/2011, 06/16/2016     ZOSTER, LIVE 01/11/2013       Exam Chest clear to auscultation and percussion.  Heart tones regular rhythm without murmur rub or gallop.  Abdomen soft nontender no organomegaly.  No peritoneal signs.  Extremities free of edema cyanosis or clubbing.  Neck veins nondistended no thyromegaly or scleral icterus noted, carotids full.  Skin warm and dry easily conversant good spirited.  Normal intelligence.  Neurologically intact no gross localizing findings.    Assessment and Plan  Diabetes mellitus type 2 --check blood sugar A1c lipid panel today.    Coronary artery disease  asymptomatic at this juncture.    Parkinson's disease questionable.  History of prior CVA or stroke rule out normal pressure hydrocephalus with urinary issues and balance issues and memory decline.  Suggest neurologic consultation.  No tremors reported at rest by the patient or his daughter who accompanies him here today.    110/58 pulse 62 regular respirations are 18 and unlabored O2 sats 99%.    History of squamous cell skin cancers with fair complexioned.    Old CVA with prior consultation with Dr. Valdez.    Falls.    Urinary frequency may be part of a urologic constellation rule out NPH versus BPH.  RTC 3 months time.    Time: total time spent with the patient was 40 minutes of which >50% was spent in counseling and coordination of care    The following low BMI interventions were performed this visit: weight gain advised    Regino Gan MD    Patient Active Problem List   Diagnosis     Chest Pain     Postherpetic Neuralgia     Sebaceous Cyst     Influenza     Vasovagal Syncope     Acute Myocardial Infarction     Paroxysmal Ventricular Tachycardia     Dizziness     Type 2 diabetes mellitus with diabetic neuropathy (H)     Hypercholesterolemia     Transient Ischemic Attack     Lumbar Radiculopathy     CAD (coronary artery disease)     ICD (implantable cardioverter-defibrillator), dual, in situ     HTN (hypertension)     Squamous cell carcinoma of left hand     Squamous cell carcinoma of skin of left upper arm     Squamous cell carcinoma of skin of right temple     Squamous cell carcinoma, face     Imbalance     Gait disturbance     Ectropion of both lower eyelids

## 2021-06-29 NOTE — PROGRESS NOTES
Progress Notes by Saurabh Blake MD at 7/7/2020  4:58 PM     Author: Saurabh Blake MD Service: -- Author Type: Physician    Filed: 7/15/2020  4:28 PM Encounter Date: 7/7/2020 Status: Signed    : Saurabh Blake MD (Physician)       7 days ago               Daughter called back to discuss LILO status. States over the weekend he was placed on Hospice and put in a Care center but is not end of life stages. Daughter is going to discuss with Hospice about Generator change out and ICD therapies now that he is on Hospice. Daughter feels he should have Gen Change and would like to keep defibrillator active for now. She wants to talk to Hospice and Dr. Blake to get opinions on the ICD with hospice. But states I do not see why we shouldn't change out the battery, she will discuss with family/Hospice and call us back with decisions.      Kaylee Kat RN        Medtronic dual-chamber ICD  So the ventricular tachycardia been paced terminated  Sinus node dysfunction with 50% atrial pacing  Almost no ventricular pacing  Lead function satisfactory  I discussed with his daughter Aneta who appears to be the power of , [phone]  Don is now in a nursing home in [facility]  Has had neurologic deterioration no longer use a walker and appears to be wheeled chair bound  May be in hospice  After discussing with his daughter and she has discussed with Levy, he apparently wants to have the defibrillator replaced  Plan   Schedule  for ICD generator replacement  Medtronic  No DFT  Sometime in August

## 2021-07-03 NOTE — ADDENDUM NOTE
Addendum Note by Jaclyn Fernando MD at 5/4/2020  2:40 PM     Author: Jaclyn Fernando MD Service: -- Author Type: Physician    Filed: 5/4/2020 12:52 PM Encounter Date: 5/4/2020 Status: Signed    : Jaclyn Fernando MD (Physician)    Addended by: JACLYN FERNANDO on: 5/4/2020 12:52 PM        Modules accepted: Orders

## 2021-07-03 NOTE — ADDENDUM NOTE
Addendum Note by Jaclyn Fernando MD at 5/4/2020  2:40 PM     Author: Jaclyn Fernando MD Service: -- Author Type: Physician    Filed: 5/4/2020 12:51 PM Encounter Date: 5/4/2020 Status: Signed    : Jaclyn Fernando MD (Physician)    Addended by: JACLYN FERNANDO on: 5/4/2020 12:51 PM        Modules accepted: Orders

## 2022-02-17 PROBLEM — K21.9 GASTROESOPHAGEAL REFLUX DISEASE: Status: ACTIVE | Noted: 2020-01-01

## 2024-04-02 NOTE — PROGRESS NOTES
[facility] Heart Care Note    Assessment:  1. History of clinical ventricular tachycardia. Ventricular tachycardia appears to originate in the inferior left ventricular region, and probably related to previous inferior posterior LV scar.   2. Dual-chamber ICD implanted 03/19/2013 -- secondary indication:. [employer]   3. Coronary artery disease with remote inferior infarct and bypass surgery.        Stress nuclear scan 10/03/2013 showed fixed inferolateral defect         Echocardiogram November 11 2015 shows fixed inferior posterior regional wall motion abnormality with ejection fraction-unchanged from 2013.   4. Neurologic problems with ataxia, dizziness, resolved upon termination of amiodarone.  Occasions of dizziness while walking-no evidence for postural hypotension  5. Transient ischemic attack, 03/2014. At that time, carotid ultrasound showed minimal disease and there was no evidence on CT scan of a stroke.  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 2019    Plan:  The pacemaker defibrillator evaluation today is excellent; expect battery to last about another year  The remote monitoring will assess battery voltage and when we reach LILO, battery depletion indicator, we will schedule for a ICD generator replacement  You had an episode of rapid ventricular tachycardia August 11 there was successfully paced terminated otherwise your heart rhythm has been quite good  On December 9, 2019 your cholesterol was excellent to 113; LDL 42  We reviewed medicines but made no adjustments  Return in 1 year for comprehensive cardiac arrhythmia device assessment        Subjective:    I had the opportunity to see.Levy Alba , who is a 79 y.o. male with a known history of ischemic cardiomyopathy and ventricular tachycardia  This 79-year-old man has been doing quite well from a cardiac standpoint  He offers no symptoms of chest pain  Breathing is  Patient is scheduled, patient was thankful and no questions remain.    satisfactory  No fluid retention no orthopnea or PND  Has no awareness of palpitations or arrhythmias no syncopal or near syncopal episodes  We did identify an episode of rapid ventricular tachycardia on August 11.  This was pace terminated and was asymptomatic  He is recently had excision of a posterior scalp melanoma-quite a large lesion  On December 9, 2019 cholesterol 113 with HDL 39 LDL 42  A1c equals 6.4  Has been followed with Dr. Morris on a regular basis  We discussed battery voltage and ILLO status.  He does have a remote home monitor that we will assess his battery voltage and when battery reaches LILO, we will schedule him for a generator replacement.  I did discuss the surgical aspects of that procedure and how this could be done as an outpatient  Once he does reach LILO, the device will have owns a daily basis to also alert him to have his device checked      Problem List:  Patient Active Problem List   Diagnosis     Chest Pain     Postherpetic Neuralgia     Sebaceous Cyst     Influenza     Vasovagal Syncope     Acute Myocardial Infarction     Paroxysmal Ventricular Tachycardia     Dizziness     Type 2 diabetes mellitus with diabetic neuropathy (H)     Hypercholesterolemia     Transient Ischemic Attack     Lumbar radiculopathy     Atherosclerosis of native coronary artery of native heart without angina pectoris     ICD (implantable cardioverter-defibrillator), dual, in situ     Primary hypertension     Squamous cell carcinoma of left hand     Squamous cell carcinoma of skin of left upper arm     Squamous cell carcinoma of skin of right temple     Squamous cell carcinoma, face     Imbalance     Gait disturbance     Ectropion of both lower eyelids     Near syncope     Melanoma of scalp (H)     Dehydration     Dyslipidemia     Lightheadedness     Lactic acidosis     Cerebrovascular disease     Recurrent falls     Unsteadiness     Nausea and vomiting, intractability of vomiting not specified, unspecified  vomiting type     Gastroesophageal reflux disease, esophagitis presence not specified     Spinal stenosis of lumbar region, unspecified whether neurogenic claudication present     Medical History:  Past Medical History:   Diagnosis Date     Acute myocardial infarction (H)      CAD (coronary artery disease)      Cancer (H)     skin     DM2 (diabetes mellitus, type 2) (H)      HLD (hyperlipidemia)      Hypertension      ICD (implantable cardioverter-defibrillator), dual, in situ      Neuropathy      Stroke (H)      TIA (transient ischemic attack)      Surgical History:  Past Surgical History:   Procedure Laterality Date     cardiac bypass       CARDIAC SURGERY       CORONARY ANGIOPLASTY WITH STENT PLACEMENT       INGUINAL HERNIA REPAIR Right 2019    Procedure: RIGHT INGUINAL HERNIA REPAIR WITH MESH;  Surgeon: Levy Duff MD;  Location: [facility];  Service: General     XR LUMBAR PUNCTURE FOR NORMAL PRESSURE HYDROCEPHALUS  2019     Social History:  Social History     Socioeconomic History     Marital status:      Spouse name: Not on file     Number of children: Not on file     Years of education: Not on file     Highest education level: Not on file   Occupational History     Not on file   Social Needs     Financial resource strain: Not on file     Food insecurity:     Worry: Not on file     Inability: Not on file     Transportation needs:     Medical: Not on file     Non-medical: Not on file   Tobacco Use     Smoking status: Former Smoker     Packs/day: 2.00     Years: 37.00     Pack years: 74.00     Last attempt to quit: 1985     Years since quittin.9     Smokeless tobacco: Never Used   Substance and Sexual Activity     Alcohol use: No     Drug use: No     Sexual activity: Not on file   Lifestyle     Physical activity:     Days per week: Not on file     Minutes per session: Not on file     Stress: Not on file   Relationships     Social connections:     Talks on phone: Not on  file     Gets together: Not on file     Attends Voodoo service: Not on file     Active member of club or organization: Not on file     Attends meetings of clubs or organizations: Not on file     Relationship status: Not on file     Intimate partner violence:     Fear of current or ex partner: Not on file     Emotionally abused: Not on file     Physically abused: Not on file     Forced sexual activity: Not on file   Other Topics Concern     Not on file   Social History Narrative     Not on file       Review of Systems:      General: WNL  Eyes: WNL  Ears/Nose/Throat: WNL  Lungs: WNL  Heart: WNL  Stomach: WNL  Bladder: WNL  Muscle/Joints: WNL  Skin: WNL  Nervous System: WNL  Mental Health: WNL     Blood: WNL        Family History:  Family History   Adopted: Yes         Allergies:  Allergies   Allergen Reactions     Adhesive Tape-Silicones Other (See Comments)     Skin tears with removal of bandages     Amiodarone      neuropathy     Codeine Nausea And Vomiting     Lisinopril Cough     Niacin Unknown     Penicillins Hives     Vicodin [Hydrocodone-Acetaminophen]        Medications:  Current Outpatient Medications   Medication Sig Dispense Refill     acetaminophen (TYLENOL) 500 MG tablet Take 1 tablet (500 mg total) by mouth every 6 (six) hours as needed.  0     ascorbic acid, vitamin C, (VITAMIN C) 500 MG tablet Take 500 mg by mouth daily with lunch.       aspirin 81 mg chewable tablet Chew 81 mg daily with lunch.              atorvastatin (LIPITOR) 20 MG tablet TAKE 1 TABLET BY MOUTH EVERY DAY 90 tablet 2     cholecalciferol, vitamin D3, 1,000 unit tablet Take 1,000 Units by mouth daily with lunch.              cyanocobalamin 500 MCG tablet Take 500 mcg by mouth daily with lunch.       finasteride (PROSCAR) 5 mg tablet Take 5 mg by mouth daily.  3     glimepiride (AMARYL) 4 MG tablet TAKE 1 TABLET BY MOUTH TWICE A  tablet 1     metFORMIN (GLUCOPHAGE) 1000 MG tablet TAKE 1 TABLET BY MOUTH TWICE A  tablet  "2     metoprolol tartrate (LOPRESSOR) 50 MG tablet TAKE 1 TABLET BY MOUTH EVERYDAY AT BEDTIME 90 tablet 0     multivitamin therapeutic tablet Take 1 tablet by mouth daily with lunch.              omega-3 fatty acids-vitamin E (FISH OIL) 1,000 mg cap Take 1 capsule by mouth daily with lunch.              omeprazole (PRILOSEC) 20 MG capsule Take 20 mg by mouth daily before breakfast.       pyridoxine, vitamin B6, (VITAMIN B-6) 100 MG tablet Take 100 mg by mouth daily with lunch.              tamsulosin (FLOMAX) 0.4 mg cap TAKE 1 CAPSULE (0.4 MG TOTAL) BY MOUTH 2 (TWO) TIMES A DAY. 180 capsule 3     losartan (COZAAR) 25 MG tablet Take 25 mg by mouth daily.       No current facility-administered medications for this visit.          Surgical site  ICD is well-healed his skin is very on even over the device lots of granular abnormal skin pattern but no signs of infection    Device interrogation  Underlying rhythm is sinus rhythm in the 50s  50% atrial pacing  Almost no ventricular pacing  3 short bursts of nonsustained ventricular tachycardia generally less than 2 seconds  He did have 1 rapid VT episode August 11 there was pace terminated-asymptomatic  Lead function satisfactory  Battery voltage at 2.65, charge 12.3 seconds would indicate about  1 year left on his battery    Objective:   Vital signs:  /50 (Patient Site: Left Arm, Patient Position: Sitting, Cuff Size: Adult Regular)   Pulse 62   Resp 14   Ht 5' 8\" (1.727 m)   Wt 157 lb (71.2 kg)   BMI 23.87 kg/m        Physical Exam:    GENERAL APPEARANCE: Alert, cooperative and in no acute distress.  HEENT: No scleral icterus. No Xanthelasma. Oral mucous membranes pink and moist.  Large excisional area at his posterior scalp area that is healing-recent melanoma surgery  NECK: JVP normal cm. No Hepatojugular reflux. Thyroid not  Palpable  CHEST: clear to auscultation and percussion  CARDIOVASCULAR: S1, S2 without murmur    Brachial, radial  pulses are intact and " symmetric.   No carotid bruits noted.  ABDOMEN: Non tender. BS+. No bruits.  EXTREMITIES: No cyanosis, clubbing or edema.    Lab Results:  LIPIDS:  Lab Results   Component Value Date    CHOL 113 12/09/2019    CHOL 135 08/19/2019    CHOL 96 05/13/2019     Lab Results   Component Value Date    HDL 39 (L) 12/09/2019    HDL 41 08/19/2019    HDL 37 (L) 05/13/2019     Lab Results   Component Value Date    LDLCALC 42 12/09/2019    LDLCALC 62 08/19/2019    LDLCALC 34 05/13/2019     Lab Results   Component Value Date    TRIG 161 (H) 12/09/2019    TRIG 162 (H) 08/19/2019    TRIG 125 05/13/2019     No components found for: CHOLHDL    BMP:  Lab Results   Component Value Date    CREATININE 1.03 09/13/2019    BUN 11 09/13/2019     09/13/2019    K 4.3 09/13/2019     09/13/2019    CO2 25 09/13/2019         This note has been dictated using voice recognition software. Any grammatical or context distortions are unintentional and inherent to the software.  Saurabh Blake MD  [facility] HEART [facility]  [phone]

## 2109-10-16 ENCOUNTER — RECORDS - HEALTHEAST (OUTPATIENT)
Dept: ADMINISTRATIVE | Facility: OTHER | Age: OVER 89
End: 2109-10-16

## (undated) DEVICE — ESU CORD BIPOLAR 12' E0512

## (undated) DEVICE — SU PLAIN 6-0 G-1DA 18" 770G

## (undated) DEVICE — SU PROLENE 4-0 RB-1DA 36" 8557H

## (undated) DEVICE — GLOVE PROTEXIS W/NEU-THERA 7.5  2D73TE75

## (undated) DEVICE — DRSG TELFA 2X3"

## (undated) DEVICE — SU PDS II 5-0 RB-2DA 30" Z148H

## (undated) DEVICE — SU VICRYL 5-0 P-1 18" UND J490G

## (undated) DEVICE — SYR 03ML LL W/O NDL 309657

## (undated) DEVICE — NDL 19GA 1.5"

## (undated) DEVICE — SOL WATER IRRIG 1000ML BOTTLE 2F7114

## (undated) DEVICE — LINEN TOWEL PACK X5 5464

## (undated) DEVICE — SOL NACL 0.9% IRRIG 1000ML BOTTLE 07138-09

## (undated) DEVICE — ESU EYE HIGH TEMP 65410-183

## (undated) DEVICE — SU CHROMIC 6-0 G-1 18" 790G

## (undated) DEVICE — SOL NACL 0.9% INJ 1000ML BAG 2B1324X

## (undated) DEVICE — EYE PREP BETADINE 5% SOLUTION 30ML 0065-0411-30

## (undated) DEVICE — DECANTER BAG 2002S

## (undated) DEVICE — DRSG COTTON BALL 6PK LCB62

## (undated) DEVICE — PACK OCULOPLATIC SEN15OCFSD

## (undated) RX ORDER — HYDROCODONE BITARTRATE AND ACETAMINOPHEN 5; 325 MG/1; MG/1
TABLET ORAL
Status: DISPENSED
Start: 2018-12-13

## (undated) RX ORDER — FENTANYL CITRATE 50 UG/ML
INJECTION, SOLUTION INTRAMUSCULAR; INTRAVENOUS
Status: DISPENSED
Start: 2018-12-13

## (undated) RX ORDER — GINSENG 100 MG
CAPSULE ORAL
Status: DISPENSED
Start: 2018-12-13

## (undated) RX ORDER — PROPOFOL 10 MG/ML
INJECTION, EMULSION INTRAVENOUS
Status: DISPENSED
Start: 2018-12-13

## (undated) RX ORDER — ONDANSETRON 2 MG/ML
INJECTION INTRAMUSCULAR; INTRAVENOUS
Status: DISPENSED
Start: 2018-12-13